# Patient Record
Sex: MALE | Race: BLACK OR AFRICAN AMERICAN | NOT HISPANIC OR LATINO | Employment: UNEMPLOYED | URBAN - METROPOLITAN AREA
[De-identification: names, ages, dates, MRNs, and addresses within clinical notes are randomized per-mention and may not be internally consistent; named-entity substitution may affect disease eponyms.]

---

## 2021-10-11 ENCOUNTER — HISTORICAL (OUTPATIENT)
Dept: ADMINISTRATIVE | Facility: HOSPITAL | Age: 56
End: 2021-10-11

## 2021-10-11 LAB
ABS NEUT (OLG): 2.53 X10(3)/MCL (ref 2.1–9.2)
ALBUMIN SERPL-MCNC: 4.2 GM/DL (ref 3.5–5)
ALBUMIN/GLOB SERPL: 1.2 RATIO (ref 1.1–2)
ALP SERPL-CCNC: 82 UNIT/L (ref 40–150)
ALT SERPL-CCNC: 16 UNIT/L (ref 0–55)
AST SERPL-CCNC: 15 UNIT/L (ref 5–34)
BASOPHILS # BLD AUTO: 0.1 X10(3)/MCL (ref 0–0.2)
BASOPHILS NFR BLD AUTO: 1 %
BILIRUB SERPL-MCNC: 0.5 MG/DL
BILIRUBIN DIRECT+TOT PNL SERPL-MCNC: 0.1 MG/DL (ref 0–0.5)
BILIRUBIN DIRECT+TOT PNL SERPL-MCNC: 0.4 MG/DL (ref 0–0.8)
BUN SERPL-MCNC: 9 MG/DL (ref 8.4–25.7)
CALCIUM SERPL-MCNC: 9.9 MG/DL (ref 8.4–10.2)
CHLORIDE SERPL-SCNC: 107 MMOL/L (ref 98–107)
CO2 SERPL-SCNC: 24 MMOL/L (ref 22–29)
CREAT SERPL-MCNC: 0.79 MG/DL (ref 0.73–1.18)
CRP SERPL HS-MCNC: 0.04 MG/DL
DEPRECATED CALCIDIOL+CALCIFEROL SERPL-MC: 22.7 NG/ML (ref 30–80)
EOSINOPHIL # BLD AUTO: 0.2 X10(3)/MCL (ref 0–0.9)
EOSINOPHIL NFR BLD AUTO: 4 %
ERYTHROCYTE [DISTWIDTH] IN BLOOD BY AUTOMATED COUNT: 15.1 % (ref 11.5–17)
GLOBULIN SER-MCNC: 3.4 GM/DL (ref 2.4–3.5)
GLUCOSE SERPL-MCNC: 110 MG/DL (ref 74–100)
HBV SURFACE AG SERPL QL IA: NONREACTIVE
HCT VFR BLD AUTO: 46.1 % (ref 42–52)
HCV AB SERPL QL IA: REACTIVE
HGB BLD-MCNC: 14.7 GM/DL (ref 14–18)
LYMPHOCYTES # BLD AUTO: 2 X10(3)/MCL (ref 0.6–4.6)
LYMPHOCYTES NFR BLD AUTO: 38 %
MCH RBC QN AUTO: 27.5 PG (ref 27–31)
MCHC RBC AUTO-ENTMCNC: 31.9 GM/DL (ref 33–36)
MCV RBC AUTO: 86.3 FL (ref 80–94)
MONOCYTES # BLD AUTO: 0.4 X10(3)/MCL (ref 0.1–1.3)
MONOCYTES NFR BLD AUTO: 8 %
NEUTROPHILS # BLD AUTO: 2.53 X10(3)/MCL (ref 2.1–9.2)
NEUTROPHILS NFR BLD AUTO: 49 %
PLATELET # BLD AUTO: 260 X10(3)/MCL (ref 130–400)
PMV BLD AUTO: 10.6 FL (ref 9.4–12.4)
POTASSIUM SERPL-SCNC: 3.9 MMOL/L (ref 3.5–5.1)
PROT SERPL-MCNC: 7.6 GM/DL (ref 6.4–8.3)
RBC # BLD AUTO: 5.34 X10(6)/MCL (ref 4.7–6.1)
RHEUMATOID FACT SERPL-ACNC: 20 IU/ML
SODIUM SERPL-SCNC: 142 MMOL/L (ref 136–145)
WBC # SPEC AUTO: 5.2 X10(3)/MCL (ref 4.5–11.5)

## 2021-10-19 ENCOUNTER — HISTORICAL (OUTPATIENT)
Dept: LAB | Facility: HOSPITAL | Age: 56
End: 2021-10-19

## 2021-10-19 LAB
ABS NEUT (OLG): 2.23 X10(3)/MCL (ref 2.1–9.2)
ALBUMIN SERPL-MCNC: 4.3 GM/DL (ref 3.5–5)
ALBUMIN/GLOB SERPL: 1.2 RATIO (ref 1.1–2)
ALP SERPL-CCNC: 78 UNIT/L (ref 40–150)
ALT SERPL-CCNC: 20 UNIT/L (ref 0–55)
AST SERPL-CCNC: 21 UNIT/L (ref 5–34)
BASOPHILS # BLD AUTO: 0 X10(3)/MCL (ref 0–0.2)
BASOPHILS NFR BLD AUTO: 1 %
BILIRUB SERPL-MCNC: 0.6 MG/DL
BILIRUBIN DIRECT+TOT PNL SERPL-MCNC: 0.2 MG/DL (ref 0–0.5)
BILIRUBIN DIRECT+TOT PNL SERPL-MCNC: 0.4 MG/DL (ref 0–0.8)
BUN SERPL-MCNC: 12.7 MG/DL (ref 8.4–25.7)
CALCIUM SERPL-MCNC: 9.9 MG/DL (ref 8.4–10.2)
CHLORIDE SERPL-SCNC: 106 MMOL/L (ref 98–107)
CHOLEST SERPL-MCNC: 234 MG/DL
CHOLEST/HDLC SERPL: 6 {RATIO} (ref 0–5)
CO2 SERPL-SCNC: 21 MMOL/L (ref 22–29)
CREAT SERPL-MCNC: 0.79 MG/DL (ref 0.73–1.18)
EOSINOPHIL # BLD AUTO: 0.2 X10(3)/MCL (ref 0–0.9)
EOSINOPHIL NFR BLD AUTO: 4 %
ERYTHROCYTE [DISTWIDTH] IN BLOOD BY AUTOMATED COUNT: 14.9 % (ref 11.5–17)
EST. AVERAGE GLUCOSE BLD GHB EST-MCNC: 108.3 MG/DL
FT4I SERPL CALC-MCNC: 2.66 (ref 2.6–3.6)
GLOBULIN SER-MCNC: 3.6 GM/DL (ref 2.4–3.5)
GLUCOSE SERPL-MCNC: 92 MG/DL (ref 74–100)
HBA1C MFR BLD: 5.4 %
HCT VFR BLD AUTO: 48.2 % (ref 42–52)
HDLC SERPL-MCNC: 37 MG/DL (ref 35–60)
HGB BLD-MCNC: 15.1 GM/DL (ref 14–18)
IRON SATN MFR SERPL: 20 % (ref 20–50)
IRON SERPL-MCNC: 77 UG/DL (ref 65–175)
LDLC SERPL CALC-MCNC: 167 MG/DL (ref 50–140)
LYMPHOCYTES # BLD AUTO: 1.7 X10(3)/MCL (ref 0.6–4.6)
LYMPHOCYTES NFR BLD AUTO: 38 %
MCH RBC QN AUTO: 27.2 PG (ref 27–31)
MCHC RBC AUTO-ENTMCNC: 31.3 GM/DL (ref 33–36)
MCV RBC AUTO: 86.7 FL (ref 80–94)
MONOCYTES # BLD AUTO: 0.4 X10(3)/MCL (ref 0.1–1.3)
MONOCYTES NFR BLD AUTO: 9 %
NEUTROPHILS # BLD AUTO: 2.23 X10(3)/MCL (ref 1.4–7.9)
NEUTROPHILS NFR BLD AUTO: 48 %
PLATELET # BLD AUTO: 277 X10(3)/MCL (ref 130–400)
PMV BLD AUTO: 9.6 FL (ref 9.4–12.4)
POTASSIUM SERPL-SCNC: 4.1 MMOL/L (ref 3.5–5.1)
PROT SERPL-MCNC: 7.9 GM/DL (ref 6.4–8.3)
RBC # BLD AUTO: 5.56 X10(6)/MCL (ref 4.7–6.1)
SODIUM SERPL-SCNC: 137 MMOL/L (ref 136–145)
T3RU NFR SERPL: 36 % (ref 31–39)
T4 SERPL-MCNC: 7.4 UG/DL (ref 4.87–11.72)
TIBC SERPL-MCNC: 308 UG/DL (ref 69–240)
TIBC SERPL-MCNC: 385 UG/DL (ref 250–450)
TRANSFERRIN SERPL-MCNC: 351 MG/DL (ref 174–364)
TRIGL SERPL-MCNC: 152 MG/DL (ref 34–140)
TSH SERPL-ACNC: 0.52 UIU/ML (ref 0.35–4.94)
VLDLC SERPL CALC-MCNC: 30 MG/DL
WBC # SPEC AUTO: 4.6 X10(3)/MCL (ref 4.5–11.5)

## 2021-11-10 ENCOUNTER — HISTORICAL (OUTPATIENT)
Dept: SPEECH THERAPY | Facility: HOSPITAL | Age: 56
End: 2021-11-10

## 2022-02-22 ENCOUNTER — HISTORICAL (OUTPATIENT)
Dept: ADMINISTRATIVE | Facility: HOSPITAL | Age: 57
End: 2022-02-22

## 2022-02-22 ENCOUNTER — HISTORICAL (OUTPATIENT)
Dept: RADIOLOGY | Facility: HOSPITAL | Age: 57
End: 2022-02-22

## 2022-03-24 ENCOUNTER — HISTORICAL (OUTPATIENT)
Dept: ADMINISTRATIVE | Facility: HOSPITAL | Age: 57
End: 2022-03-24

## 2022-03-24 LAB
ALBUMIN SERPL-MCNC: 4.1 G/DL (ref 3.5–5)
ALBUMIN/GLOB SERPL: 1.2 {RATIO} (ref 1.1–2)
ALP SERPL-CCNC: 72 U/L (ref 40–150)
ALT SERPL-CCNC: 23 U/L (ref 0–55)
AST SERPL-CCNC: 22 U/L (ref 5–34)
BILIRUB SERPL-MCNC: 0.4 MG/DL
BILIRUBIN DIRECT+TOT PNL SERPL-MCNC: 0.1 (ref 0–0.5)
BILIRUBIN DIRECT+TOT PNL SERPL-MCNC: 0.3 (ref 0–0.8)
BUN SERPL-MCNC: 9.7 MG/DL (ref 8.4–25.7)
CALCIUM SERPL-MCNC: 9.4 MG/DL (ref 8.7–10.5)
CHLORIDE SERPL-SCNC: 108 MMOL/L (ref 98–107)
CHOLEST SERPL-MCNC: 242 MG/DL
CHOLEST/HDLC SERPL: 7 {RATIO} (ref 0–5)
CO2 SERPL-SCNC: 22 MMOL/L (ref 22–29)
CREAT SERPL-MCNC: 0.89 MG/DL (ref 0.73–1.18)
ERYTHROCYTE [DISTWIDTH] IN BLOOD BY AUTOMATED COUNT: 16 % (ref 11.5–17)
GLOBULIN SER-MCNC: 3.5 G/DL (ref 2.4–3.5)
GLUCOSE SERPL-MCNC: 93 MG/DL (ref 74–100)
HCT VFR BLD AUTO: 45.9 % (ref 42–52)
HDLC SERPL-MCNC: 33 MG/DL (ref 35–60)
HEMOLYSIS INTERF INDEX SERPL-ACNC: 3
HEMOLYSIS INTERF INDEX SERPL-ACNC: 3
HGB BLD-MCNC: 14.5 G/DL (ref 14–18)
ICTERIC INTERF INDEX SERPL-ACNC: 0
INR PPP: 1 (ref 0–1.3)
LDLC SERPL CALC-MCNC: 171 MG/DL (ref 50–140)
LIPEMIC INTERF INDEX SERPL-ACNC: 2
MAGNESIUM SERPL-MCNC: 2.1 MG/DL (ref 1.6–2.6)
MCH RBC QN AUTO: 26.8 PG (ref 27–31)
MCHC RBC AUTO-ENTMCNC: 31.6 G/DL (ref 33–36)
MCV RBC AUTO: 84.7 FL (ref 80–94)
PLATELET # BLD AUTO: 293 10*3/UL (ref 130–400)
PMV BLD AUTO: 10.9 FL (ref 9.4–12.4)
POTASSIUM SERPL-SCNC: 4.4 MMOL/L (ref 3.5–5.1)
PROT SERPL-MCNC: 7.6 G/DL (ref 6.4–8.3)
PROTHROMBIN TIME: 12.5 S (ref 12.5–14.5)
RBC # BLD AUTO: 5.42 10*6/UL (ref 4.7–6.1)
SODIUM SERPL-SCNC: 140 MMOL/L (ref 136–145)
TRIGL SERPL-MCNC: 190 MG/DL (ref 34–140)
TSH SERPL-ACNC: 1.08 M[IU]/L (ref 0.35–4.94)
VLDLC SERPL CALC-MCNC: 38 MG/DL
WBC # SPEC AUTO: 4.5 10*3/UL (ref 4.5–11.5)

## 2022-03-25 ENCOUNTER — HISTORICAL (OUTPATIENT)
Dept: CARDIOLOGY | Facility: HOSPITAL | Age: 57
End: 2022-03-25

## 2022-04-10 ENCOUNTER — HISTORICAL (OUTPATIENT)
Dept: ADMINISTRATIVE | Facility: HOSPITAL | Age: 57
End: 2022-04-10
Payer: MEDICAID

## 2022-04-11 ENCOUNTER — HISTORICAL (OUTPATIENT)
Dept: LAB | Facility: HOSPITAL | Age: 57
End: 2022-04-11

## 2022-04-11 LAB
ALBUMIN SERPL-MCNC: 4.2 G/DL (ref 3.5–5)
ALBUMIN/GLOB SERPL: 1.3 {RATIO} (ref 1.1–2)
ALP SERPL-CCNC: 74 U/L (ref 40–150)
ALT SERPL-CCNC: 37 U/L (ref 0–55)
AST SERPL-CCNC: 29 U/L (ref 5–34)
BILIRUB SERPL-MCNC: 0.7 MG/DL
BILIRUBIN DIRECT+TOT PNL SERPL-MCNC: 0.3 (ref 0–0.5)
BILIRUBIN DIRECT+TOT PNL SERPL-MCNC: 0.4 (ref 0–0.8)
BUN SERPL-MCNC: 12.6 MG/DL (ref 8.4–25.7)
CALCIUM SERPL-MCNC: 9.9 MG/DL (ref 8.7–10.5)
CHLORIDE SERPL-SCNC: 107 MMOL/L (ref 98–107)
CO2 SERPL-SCNC: 23 MMOL/L (ref 22–29)
CREAT SERPL-MCNC: 0.94 MG/DL (ref 0.73–1.18)
ERYTHROCYTE [DISTWIDTH] IN BLOOD BY AUTOMATED COUNT: 16.1 % (ref 11.5–17)
GLOBULIN SER-MCNC: 3.3 G/DL (ref 2.4–3.5)
GLUCOSE SERPL-MCNC: 110 MG/DL (ref 74–100)
HCT VFR BLD AUTO: 44.4 % (ref 42–52)
HEMOLYSIS INTERF INDEX SERPL-ACNC: 2
HGB BLD-MCNC: 14.1 G/DL (ref 14–18)
ICTERIC INTERF INDEX SERPL-ACNC: 0
LIPEMIC INTERF INDEX SERPL-ACNC: <0
MCH RBC QN AUTO: 27 PG (ref 27–31)
MCHC RBC AUTO-ENTMCNC: 31.8 G/DL (ref 33–36)
MCV RBC AUTO: 85.1 FL (ref 80–94)
PLATELET # BLD AUTO: 264 10*3/UL (ref 130–400)
PMV BLD AUTO: 10.1 FL (ref 9.4–12.4)
POTASSIUM SERPL-SCNC: 4 MMOL/L (ref 3.5–5.1)
PROT SERPL-MCNC: 7.5 G/DL (ref 6.4–8.3)
RBC # BLD AUTO: 5.22 10*6/UL (ref 4.7–6.1)
SODIUM SERPL-SCNC: 140 MMOL/L (ref 136–145)
WBC # SPEC AUTO: 5 10*3/UL (ref 4.5–11.5)

## 2022-04-13 ENCOUNTER — HISTORICAL (OUTPATIENT)
Dept: ADMINISTRATIVE | Facility: HOSPITAL | Age: 57
End: 2022-04-13

## 2022-04-26 VITALS
OXYGEN SATURATION: 95 % | DIASTOLIC BLOOD PRESSURE: 92 MMHG | HEIGHT: 66 IN | SYSTOLIC BLOOD PRESSURE: 142 MMHG | BODY MASS INDEX: 37.41 KG/M2 | WEIGHT: 232.81 LBS

## 2022-05-03 RX ORDER — HYDROCODONE BITARTRATE AND ACETAMINOPHEN 10; 325 MG/1; MG/1
1 TABLET ORAL EVERY 12 HOURS PRN
Qty: 60 TABLET | Refills: 0 | Status: SHIPPED | OUTPATIENT
Start: 2022-05-03 | End: 2022-05-31

## 2022-05-14 NOTE — DISCHARGE SUMMARY
DISCHARGE DATE:  03/25/2022    HOSPITAL COURSE:  Mr. Restrepo was admitted for elective implantation of an implantable recorder to further evaluate suspicious cardiovascular symptoms of syncope, presyncope and palpitations.  As outlined in the operative report, he underwent a successful and uneventful implantation of the [employer] implantable loop recorder/Confirm device.  He tolerated the procedure rather well, and the postoperative course has been unremarkable, allowing for discharge home today.    DISCHARGE DIAGNOSES:    1. Palpitations/presyncope/syncope.  2. Status post successful implantation of the [employer] implantable loop recorder/Confirm.    3. Hypertension.   4. Dyslipidemia.    RECOMMENDATIONS:  The patient will be arranged for discharge home today to continue same medications as on admission.  I have asked him to call or return should he have any further problems or complaints.  I will be checking on his progress in my office in the very near future.  We will continue to monitor his progress closely and titrate him back to therapies as tolerated.  He will also be discharged home on empiric Cipro 250 mg twice daily for 1 week, as surgical prophylaxis, and also on p.r.n. ibuprofen for possible pain.  I am also discharging him on Lipitor 40 mg daily, and omega-3 fish oil 2 g twice daily.  Further recommendations forthcoming.        ______________________________  DO CESAR Alexander/  DD:  03/25/2022  Time:  05:07PM  DT:  03/25/2022  Time:  05:53PM  Job #:  [id number]    cc: Hemant Maravilla DO

## 2022-05-14 NOTE — OP NOTE
Patient:   Darius Restrepo            MRN: [id number]            FIN: [id number]               Age:   57 years     Sex:  Male     :  1965   Associated Diagnoses:   Palpitations; Pre-syncope; Syncope   Author:   Hemant Maravilla DO      Brief Operative Note   Operative Information   Date/ Time:  3/25/2022 11:34:00.     Preoperative Diagnosis: Palpitations (ZGT38-UZ R00.2), Pre-syncope (CDP41-YD R55), Syncope (LGE63-QK R55).     Postoperative Diagnosis: Palpitations (MEE35-EV R00.2), Pre-syncope (IXA95-OQ R55), Syncope (ZBD06-VG R55).     Procedures Performed: Implantation of SJM ILR.   .     Surgeon: Hemant Maravilla DO     Esimated blood loss: No blood loss.     Description of Procedure/Findings/    Complications: See op report on chart.   .

## 2022-05-17 DIAGNOSIS — M75.101 ROTATOR CUFF SYNDROME OF RIGHT SHOULDER: Primary | ICD-10-CM

## 2022-05-23 ENCOUNTER — HOSPITAL ENCOUNTER (EMERGENCY)
Facility: HOSPITAL | Age: 57
Discharge: HOME | End: 2022-05-23
Attending: INTERNAL MEDICINE
Payer: MEDICAID

## 2022-05-23 VITALS
TEMPERATURE: 99 F | BODY MASS INDEX: 38.57 KG/M2 | OXYGEN SATURATION: 96 % | WEIGHT: 240 LBS | HEIGHT: 66 IN | RESPIRATION RATE: 26 BRPM | SYSTOLIC BLOOD PRESSURE: 145 MMHG | DIASTOLIC BLOOD PRESSURE: 87 MMHG | HEART RATE: 76 BPM

## 2022-05-23 DIAGNOSIS — G40.909 SEIZURE DISORDER: Primary | ICD-10-CM

## 2022-05-23 DIAGNOSIS — I10 HYPERTENSION: ICD-10-CM

## 2022-05-23 DIAGNOSIS — R56.9 SEIZURE: ICD-10-CM

## 2022-05-23 LAB
ALBUMIN SERPL-MCNC: 4 GM/DL (ref 3.5–5)
ALBUMIN/GLOB SERPL: 1.1 RATIO (ref 1.1–2)
ALP SERPL-CCNC: 85 UNIT/L (ref 40–150)
ALT SERPL-CCNC: 23 UNIT/L (ref 0–55)
AST SERPL-CCNC: 19 UNIT/L (ref 5–34)
BASOPHILS # BLD AUTO: 0.03 X10(3)/MCL (ref 0–0.2)
BASOPHILS NFR BLD AUTO: 0.6 %
BILIRUBIN DIRECT+TOT PNL SERPL-MCNC: 0.8 MG/DL
BUN SERPL-MCNC: 17.7 MG/DL (ref 8.4–25.7)
CALCIUM SERPL-MCNC: 9.7 MG/DL (ref 8.4–10.2)
CHLORIDE SERPL-SCNC: 106 MMOL/L (ref 98–107)
CK SERPL-CCNC: 384 U/L (ref 30–200)
CO2 SERPL-SCNC: 24 MMOL/L (ref 22–29)
CREAT SERPL-MCNC: 0.99 MG/DL (ref 0.73–1.18)
EOSINOPHIL # BLD AUTO: 0.15 X10(3)/MCL (ref 0–0.9)
EOSINOPHIL NFR BLD AUTO: 2.8 %
ERYTHROCYTE [DISTWIDTH] IN BLOOD BY AUTOMATED COUNT: 15.4 % (ref 11.5–17)
GLOBULIN SER-MCNC: 3.7 GM/DL (ref 2.4–3.5)
GLUCOSE SERPL-MCNC: 91 MG/DL (ref 74–100)
HCT VFR BLD AUTO: 45.1 % (ref 42–52)
HGB BLD-MCNC: 14.5 GM/DL (ref 14–18)
IMM GRANULOCYTES # BLD AUTO: 0 X10(3)/MCL (ref 0–0.02)
IMM GRANULOCYTES NFR BLD AUTO: 0 % (ref 0–0.43)
LYMPHOCYTES # BLD AUTO: 1.22 X10(3)/MCL (ref 0.6–4.6)
LYMPHOCYTES NFR BLD AUTO: 22.5 %
MCH RBC QN AUTO: 27.2 PG (ref 27–31)
MCHC RBC AUTO-ENTMCNC: 32.2 MG/DL (ref 33–36)
MCV RBC AUTO: 84.6 FL (ref 80–94)
MONOCYTES # BLD AUTO: 0.44 X10(3)/MCL (ref 0.1–1.3)
MONOCYTES NFR BLD AUTO: 8.1 %
NEUTROPHILS # BLD AUTO: 3.6 X10(3)/MCL (ref 2.1–9.2)
NEUTROPHILS NFR BLD AUTO: 66 %
PLATELET # BLD AUTO: 277 X10(3)/MCL (ref 130–400)
PMV BLD AUTO: 9.6 FL (ref 9.4–12.4)
POTASSIUM SERPL-SCNC: 3.7 MMOL/L (ref 3.5–5.1)
PROT SERPL-MCNC: 7.7 GM/DL (ref 6.4–8.3)
RBC # BLD AUTO: 5.33 X10(6)/MCL (ref 4.7–6.1)
SODIUM SERPL-SCNC: 139 MMOL/L (ref 136–145)
WBC # SPEC AUTO: 5.4 X10(3)/MCL (ref 4.5–11.5)

## 2022-05-23 PROCEDURE — 63600175 PHARM REV CODE 636 W HCPCS

## 2022-05-23 PROCEDURE — 82550 ASSAY OF CK (CPK): CPT | Performed by: INTERNAL MEDICINE

## 2022-05-23 PROCEDURE — 93010 ELECTROCARDIOGRAM REPORT: CPT | Mod: ,,, | Performed by: INTERNAL MEDICINE

## 2022-05-23 PROCEDURE — 80053 COMPREHEN METABOLIC PANEL: CPT | Performed by: INTERNAL MEDICINE

## 2022-05-23 PROCEDURE — 93010 EKG 12-LEAD: ICD-10-PCS | Mod: ,,, | Performed by: INTERNAL MEDICINE

## 2022-05-23 PROCEDURE — 36415 COLL VENOUS BLD VENIPUNCTURE: CPT | Performed by: INTERNAL MEDICINE

## 2022-05-23 PROCEDURE — 85025 COMPLETE CBC W/AUTO DIFF WBC: CPT | Performed by: INTERNAL MEDICINE

## 2022-05-23 PROCEDURE — 99285 EMERGENCY DEPT VISIT HI MDM: CPT | Mod: 25

## 2022-05-23 PROCEDURE — 96365 THER/PROPH/DIAG IV INF INIT: CPT

## 2022-05-23 PROCEDURE — 93005 ELECTROCARDIOGRAM TRACING: CPT

## 2022-05-23 RX ORDER — CLOPIDOGREL BISULFATE 75 MG/1
75 TABLET ORAL DAILY
COMMUNITY
End: 2023-01-30

## 2022-05-23 RX ORDER — PREDNISONE 20 MG/1
20 TABLET ORAL 2 TIMES DAILY
COMMUNITY
End: 2022-06-06

## 2022-05-23 RX ORDER — MELOXICAM 15 MG/1
15 TABLET ORAL DAILY
COMMUNITY
Start: 2021-12-09 | End: 2022-06-06 | Stop reason: SDUPTHER

## 2022-05-23 RX ORDER — SERTRALINE HYDROCHLORIDE 100 MG/1
100 TABLET, FILM COATED ORAL DAILY
COMMUNITY
End: 2022-06-06

## 2022-05-23 RX ORDER — IBUPROFEN 800 MG/1
800 TABLET ORAL 3 TIMES DAILY PRN
COMMUNITY
End: 2022-06-06

## 2022-05-23 RX ORDER — LEVETIRACETAM 5 MG/ML
INJECTION INTRAVASCULAR
Status: COMPLETED
Start: 2022-05-23 | End: 2022-05-23

## 2022-05-23 RX ORDER — HYDRALAZINE HYDROCHLORIDE 10 MG/1
10 TABLET, FILM COATED ORAL 3 TIMES DAILY
COMMUNITY
Start: 2021-10-31 | End: 2023-02-21 | Stop reason: ALTCHOICE

## 2022-05-23 RX ORDER — RISPERIDONE 1 MG/1
1 TABLET, ORALLY DISINTEGRATING ORAL 2 TIMES DAILY
COMMUNITY
End: 2023-02-21 | Stop reason: ALTCHOICE

## 2022-05-23 RX ORDER — LEVETIRACETAM 500 MG/1
500 TABLET ORAL 2 TIMES DAILY
Qty: 60 TABLET | Refills: 11 | Status: SHIPPED | OUTPATIENT
Start: 2022-05-23 | End: 2023-01-30

## 2022-05-23 RX ORDER — DICLOFENAC POTASSIUM 50 MG/1
50 TABLET, FILM COATED ORAL 2 TIMES DAILY
COMMUNITY
End: 2022-06-06

## 2022-05-23 RX ORDER — ACETAMINOPHEN AND CODEINE PHOSPHATE 300; 30 MG/1; MG/1
1 TABLET ORAL EVERY 8 HOURS PRN
COMMUNITY
End: 2022-06-06

## 2022-05-23 RX ORDER — LEFLUNOMIDE 10 MG/1
20 TABLET ORAL DAILY
COMMUNITY
End: 2022-06-06 | Stop reason: SDUPTHER

## 2022-05-23 RX ORDER — ESCITALOPRAM OXALATE 20 MG/1
20 TABLET ORAL DAILY
COMMUNITY
End: 2022-06-06 | Stop reason: SDUPTHER

## 2022-05-23 RX ORDER — LEVETIRACETAM 500 MG/5ML
1000 INJECTION, SOLUTION, CONCENTRATE INTRAVENOUS
Status: DISCONTINUED | OUTPATIENT
Start: 2022-05-23 | End: 2022-05-23 | Stop reason: HOSPADM

## 2022-05-23 RX ORDER — AMLODIPINE BESYLATE 10 MG/1
10 TABLET ORAL DAILY
Status: ON HOLD | COMMUNITY
End: 2022-12-02

## 2022-05-23 RX ORDER — HYDROCHLOROTHIAZIDE 12.5 MG/1
12.5 TABLET ORAL DAILY
COMMUNITY

## 2022-05-23 RX ORDER — OMEPRAZOLE 40 MG/1
40 CAPSULE, DELAYED RELEASE ORAL DAILY
COMMUNITY
Start: 2022-01-31 | End: 2022-06-06 | Stop reason: SDUPTHER

## 2022-05-23 RX ORDER — QUETIAPINE FUMARATE 50 MG/1
50 TABLET, FILM COATED ORAL NIGHTLY
COMMUNITY
End: 2022-06-06

## 2022-05-23 RX ORDER — CLONIDINE 0.1 MG/24H
1 PATCH, EXTENDED RELEASE TRANSDERMAL
Status: ON HOLD | COMMUNITY
End: 2022-12-02

## 2022-05-23 RX ADMIN — LEVETIRACETAM 1000 MG: 5 INJECTION INTRAVENOUS at 04:05

## 2022-05-23 NOTE — PROVIDER PROGRESS NOTES - EMERGENCY DEPT.
Encounter Date: 5/23/2022    ED Physician Progress Notes        Labs are normal, patient unable to remember if he is taking anti seizure meds, will give IV keppra and discharge on keppra 750 BID, follow up with PCP in 1 week

## 2022-05-23 NOTE — ED PROVIDER NOTES
Encounter Date: 5/23/2022       History     Chief Complaint   Patient presents with    Seizures     Seizure prior to arrival. History of seizures     58y/o male brought by EMS for a breakthrough seizure while sitting on the porch this morning witnessed by family.  He has a hx of Seizure  but does not know his meds. He gets frequent episode every month . He has had previous stroke in the past affected his speech and right side. He did not hit his head. He has a hx of HTN, CVA and heart disease.        Review of patient's allergies indicates:  No Known Allergies  No past medical history on file.  No past surgical history on file.  No family history on file.     Review of Systems   Constitutional: Negative for chills and fever.   HENT: Negative.    Eyes: Negative.    Respiratory: Negative for chest tightness and shortness of breath.    Cardiovascular: Negative for chest pain.   Gastrointestinal: Negative for abdominal pain.   Endocrine: Negative.    Genitourinary: Negative.    Musculoskeletal: Negative for back pain.   Neurological: Positive for headaches. Negative for dizziness and light-headedness.   Hematological: Negative.        Physical Exam     Initial Vitals [05/23/22 1302]   BP Pulse Resp Temp SpO2   119/79 84 20 98.5 °F (36.9 °C) 97 %      MAP       --         Physical Exam    Nursing note and vitals reviewed.  Constitutional: He appears well-developed and well-nourished. No distress.   HENT:   Head: Normocephalic.   Mouth/Throat: Oropharynx is clear and moist.   Eyes: Conjunctivae and EOM are normal. Pupils are equal, round, and reactive to light.   Neck: Neck supple. No JVD present.   Normal range of motion.  Cardiovascular: Normal rate, regular rhythm and normal heart sounds.   Pulmonary/Chest: Breath sounds normal. No respiratory distress. He has no wheezes. He has no rales.   Abdominal: Abdomen is soft. Bowel sounds are normal.   Musculoskeletal:         General: No edema. Normal range of motion.       Cervical back: Normal range of motion and neck supple.     Neurological: He is alert and oriented to person, place, and time. He displays normal reflexes. No cranial nerve deficit or sensory deficit.   Slight weakness RUE from previous stroke  Negative babinski   Skin: Skin is warm. Capillary refill takes less than 2 seconds.   Psychiatric: He has a normal mood and affect.         ED Course   Procedures  Labs Reviewed   CBC W/ AUTO DIFFERENTIAL    Narrative:     The following orders were created for panel order CBC auto differential.  Procedure                               Abnormality         Status                     ---------                               -----------         ------                     CBC with Differential[[id number]]                                                         Please view results for these tests on the individual orders.   COMPREHENSIVE METABOLIC PANEL   CK   CBC WITH DIFFERENTIAL     EKG Readings: (Independently Interpreted)   Initial Reading: No STEMI. Heart Rate: 78. Conduction: Normal. ST Segments: Normal ST Segments. Axis: Normal.       Imaging Results    None          Medications - No data to display                       Clinical Impression:   Final diagnoses:  [I10] Hypertension  [R56.9] Seizure     will do labs, CXR, CT head, place on cardiac monitor, observe then reassess pt.            Robson Uriarte MD  05/23/22 [id number]

## 2022-05-31 RX ORDER — HYDROCODONE BITARTRATE AND ACETAMINOPHEN 10; 325 MG/1; MG/1
TABLET ORAL
Qty: 60 TABLET | Refills: 0 | Status: SHIPPED | OUTPATIENT
Start: 2022-05-31 | End: 2022-06-06 | Stop reason: SDUPTHER

## 2022-06-06 ENCOUNTER — OFFICE VISIT (OUTPATIENT)
Dept: RHEUMATOLOGY | Facility: CLINIC | Age: 57
End: 2022-06-06
Payer: MEDICAID

## 2022-06-06 VITALS
TEMPERATURE: 98 F | RESPIRATION RATE: 18 BRPM | BODY MASS INDEX: 40.47 KG/M2 | SYSTOLIC BLOOD PRESSURE: 165 MMHG | DIASTOLIC BLOOD PRESSURE: 103 MMHG | OXYGEN SATURATION: 95 % | HEIGHT: 66 IN | HEART RATE: 86 BPM | WEIGHT: 251.81 LBS

## 2022-06-06 DIAGNOSIS — M35.9 UNDIFFERENTIATED CONNECTIVE TISSUE DISEASE: Primary | ICD-10-CM

## 2022-06-06 DIAGNOSIS — M75.100 TEAR OF ROTATOR CUFF, UNSPECIFIED LATERALITY, UNSPECIFIED TEAR EXTENT, UNSPECIFIED WHETHER TRAUMATIC: ICD-10-CM

## 2022-06-06 DIAGNOSIS — F51.01 PRIMARY INSOMNIA: ICD-10-CM

## 2022-06-06 DIAGNOSIS — I10 PRIMARY HYPERTENSION: ICD-10-CM

## 2022-06-06 DIAGNOSIS — F32.A DEPRESSIVE DISORDER: ICD-10-CM

## 2022-06-06 DIAGNOSIS — M79.7 FIBROMYALGIA: ICD-10-CM

## 2022-06-06 PROBLEM — G47.00 INSOMNIA: Status: ACTIVE | Noted: 2022-06-06

## 2022-06-06 PROCEDURE — 99999 PR PBB SHADOW E&M-EST. PATIENT-LVL IV: CPT | Mod: PBBFAC,,, | Performed by: INTERNAL MEDICINE

## 2022-06-06 PROCEDURE — 3008F BODY MASS INDEX DOCD: CPT | Mod: CPTII,,, | Performed by: INTERNAL MEDICINE

## 2022-06-06 PROCEDURE — 99214 OFFICE O/P EST MOD 30 MIN: CPT | Mod: S$PBB,,, | Performed by: INTERNAL MEDICINE

## 2022-06-06 PROCEDURE — 1159F PR MEDICATION LIST DOCUMENTED IN MEDICAL RECORD: ICD-10-PCS | Mod: CPTII,,, | Performed by: INTERNAL MEDICINE

## 2022-06-06 PROCEDURE — 1160F RVW MEDS BY RX/DR IN RCRD: CPT | Mod: CPTII,,, | Performed by: INTERNAL MEDICINE

## 2022-06-06 PROCEDURE — 99999 PR PBB SHADOW E&M-EST. PATIENT-LVL IV: ICD-10-PCS | Mod: PBBFAC,,, | Performed by: INTERNAL MEDICINE

## 2022-06-06 PROCEDURE — 4010F ACE/ARB THERAPY RXD/TAKEN: CPT | Mod: CPTII,,, | Performed by: INTERNAL MEDICINE

## 2022-06-06 PROCEDURE — 1160F PR REVIEW ALL MEDS BY PRESCRIBER/CLIN PHARMACIST DOCUMENTED: ICD-10-PCS | Mod: CPTII,,, | Performed by: INTERNAL MEDICINE

## 2022-06-06 PROCEDURE — 99214 OFFICE O/P EST MOD 30 MIN: CPT | Mod: PBBFAC | Performed by: INTERNAL MEDICINE

## 2022-06-06 PROCEDURE — 4010F PR ACE/ARB THEARPY RXD/TAKEN: ICD-10-PCS | Mod: CPTII,,, | Performed by: INTERNAL MEDICINE

## 2022-06-06 PROCEDURE — 3008F PR BODY MASS INDEX (BMI) DOCUMENTED: ICD-10-PCS | Mod: CPTII,,, | Performed by: INTERNAL MEDICINE

## 2022-06-06 PROCEDURE — 1159F MED LIST DOCD IN RCRD: CPT | Mod: CPTII,,, | Performed by: INTERNAL MEDICINE

## 2022-06-06 PROCEDURE — 99214 PR OFFICE/OUTPT VISIT, EST, LEVL IV, 30-39 MIN: ICD-10-PCS | Mod: S$PBB,,, | Performed by: INTERNAL MEDICINE

## 2022-06-06 RX ORDER — HYDROCODONE BITARTRATE AND ACETAMINOPHEN 10; 325 MG/1; MG/1
TABLET ORAL
Qty: 60 TABLET | Refills: 0 | Status: SHIPPED | OUTPATIENT
Start: 2022-06-06 | End: 2022-07-05 | Stop reason: SDUPTHER

## 2022-06-06 RX ORDER — PREGABALIN 75 MG/1
75 CAPSULE ORAL 2 TIMES DAILY
COMMUNITY
Start: 2022-03-03 | End: 2022-06-06 | Stop reason: SDUPTHER

## 2022-06-06 RX ORDER — ALPRAZOLAM 0.5 MG/1
0.5 TABLET ORAL 2 TIMES DAILY PRN
COMMUNITY
Start: 2022-05-17 | End: 2022-06-06 | Stop reason: SDUPTHER

## 2022-06-06 RX ORDER — HYDROXYCHLOROQUINE SULFATE 200 MG/1
200 TABLET, FILM COATED ORAL 2 TIMES DAILY
COMMUNITY
Start: 2021-12-10 | End: 2022-06-06 | Stop reason: SDUPTHER

## 2022-06-06 RX ORDER — TRAMADOL HYDROCHLORIDE 50 MG/1
50 TABLET ORAL EVERY 6 HOURS PRN
COMMUNITY
Start: 2022-02-22 | End: 2022-09-16 | Stop reason: SDUPTHER

## 2022-06-06 RX ORDER — KETOROLAC TROMETHAMINE 10 MG/1
10 TABLET, FILM COATED ORAL DAILY PRN
COMMUNITY
Start: 2022-02-14 | End: 2022-06-06

## 2022-06-06 RX ORDER — PREGABALIN 75 MG/1
75 CAPSULE ORAL 3 TIMES DAILY
Qty: 90 CAPSULE | Refills: 5 | Status: SHIPPED | OUTPATIENT
Start: 2022-06-06 | End: 2022-09-16 | Stop reason: SDUPTHER

## 2022-06-06 RX ORDER — ESCITALOPRAM OXALATE 20 MG/1
10 TABLET ORAL DAILY
Qty: 30 TABLET | Refills: 5 | Status: SHIPPED | OUTPATIENT
Start: 2022-06-06 | End: 2022-09-16 | Stop reason: SDUPTHER

## 2022-06-06 RX ORDER — OLANZAPINE 5 MG/1
5 TABLET ORAL NIGHTLY
COMMUNITY
Start: 2022-04-11 | End: 2023-02-21 | Stop reason: ALTCHOICE

## 2022-06-06 RX ORDER — LISINOPRIL AND HYDROCHLOROTHIAZIDE 12.5; 2 MG/1; MG/1
2 TABLET ORAL NIGHTLY
COMMUNITY
Start: 2022-05-19 | End: 2023-02-21 | Stop reason: ALTCHOICE

## 2022-06-06 RX ORDER — MELOXICAM 15 MG/1
15 TABLET ORAL DAILY
Qty: 30 TABLET | Refills: 5 | Status: SHIPPED | OUTPATIENT
Start: 2022-06-06 | End: 2022-09-16

## 2022-06-06 RX ORDER — ATORVASTATIN CALCIUM 20 MG/1
20 TABLET, FILM COATED ORAL DAILY
COMMUNITY
Start: 2022-05-19

## 2022-06-06 RX ORDER — ALPRAZOLAM 0.5 MG/1
0.5 TABLET ORAL 2 TIMES DAILY PRN
Qty: 60 TABLET | Refills: 5 | Status: SHIPPED | OUTPATIENT
Start: 2022-06-06 | End: 2022-09-16 | Stop reason: SDUPTHER

## 2022-06-06 RX ORDER — TEMAZEPAM 15 MG/1
15 CAPSULE ORAL NIGHTLY PRN
COMMUNITY
Start: 2022-01-20 | End: 2022-06-06 | Stop reason: SDUPTHER

## 2022-06-06 RX ORDER — CYCLOBENZAPRINE HCL 10 MG
10 TABLET ORAL 3 TIMES DAILY
COMMUNITY
Start: 2022-05-19 | End: 2022-06-06

## 2022-06-06 RX ORDER — OMEPRAZOLE 40 MG/1
40 CAPSULE, DELAYED RELEASE ORAL DAILY
Qty: 30 CAPSULE | Refills: 5 | Status: SHIPPED | OUTPATIENT
Start: 2022-06-06 | End: 2022-09-16 | Stop reason: SDUPTHER

## 2022-06-06 RX ORDER — QUETIAPINE FUMARATE 300 MG/1
300 TABLET, FILM COATED ORAL NIGHTLY
COMMUNITY
Start: 2022-05-25 | End: 2023-05-25

## 2022-06-06 RX ORDER — CLONIDINE 0.3 MG/24H
1 PATCH, EXTENDED RELEASE TRANSDERMAL
COMMUNITY
Start: 2022-05-25

## 2022-06-06 RX ORDER — TEMAZEPAM 15 MG/1
15 CAPSULE ORAL NIGHTLY
Qty: 30 CAPSULE | Refills: 5 | Status: SHIPPED | OUTPATIENT
Start: 2022-06-06 | End: 2023-02-21 | Stop reason: ALTCHOICE

## 2022-06-06 RX ORDER — ASPIRIN 325 MG
50000 TABLET, DELAYED RELEASE (ENTERIC COATED) ORAL
COMMUNITY
Start: 2022-01-28 | End: 2022-06-06 | Stop reason: SDUPTHER

## 2022-06-06 RX ORDER — LEFLUNOMIDE 10 MG/1
20 TABLET ORAL DAILY
Qty: 30 TABLET | Refills: 5 | Status: SHIPPED | OUTPATIENT
Start: 2022-06-06 | End: 2022-09-16

## 2022-06-06 RX ORDER — IBUPROFEN 200 MG
1 TABLET ORAL DAILY
COMMUNITY
Start: 2022-02-14 | End: 2023-02-21 | Stop reason: ALTCHOICE

## 2022-06-06 RX ORDER — ASPIRIN 325 MG
50000 TABLET, DELAYED RELEASE (ENTERIC COATED) ORAL
Qty: 5 CAPSULE | Refills: 5 | Status: SHIPPED | OUTPATIENT
Start: 2022-06-06 | End: 2022-09-16 | Stop reason: SDUPTHER

## 2022-06-06 RX ORDER — HYDROXYCHLOROQUINE SULFATE 200 MG/1
200 TABLET, FILM COATED ORAL 2 TIMES DAILY
Qty: 60 TABLET | Refills: 5 | Status: SHIPPED | OUTPATIENT
Start: 2022-06-06 | End: 2022-09-16 | Stop reason: SDUPTHER

## 2022-06-06 RX ORDER — CLONIDINE HYDROCHLORIDE 0.2 MG/1
0.2 TABLET ORAL 2 TIMES DAILY
Status: ON HOLD | COMMUNITY
End: 2022-12-02

## 2022-06-06 NOTE — PROGRESS NOTES
"Subjective:       Patient ID: Darius Restrepo is a 57 y.o. male.    Chief Complaint: Follow-up (Generalized pain.. wants to know if the Quantity of his pain medication could be changed from 60 to 90)    New onset seizures. The patient is complaining of joint pain involving the MCP PIP wrist elbow shoulders hips knees and ankles bilaterally.  The pain is 10/10 in intensity dull in quality and continuous.  That is associated with a morning stiffness lasting for more than 60 minutes.  Is also having difficulty maintaining a good night of sleep.  This has been associated with myalgias.  Muscle aches are 10/10 in intensity dull in quality and continuous.  They are associated with fatigue.  No fever no chills no others.      Follow-up  Pertinent negatives include no abdominal pain, chest pain, chills, congestion, fever, rash, vomiting or weakness.     Review of Systems   Constitutional: Negative for appetite change, chills and fever.   HENT: Negative for congestion, ear pain, mouth sores, nosebleeds and trouble swallowing.    Eyes: Negative for photophobia and discharge.   Respiratory: Negative for chest tightness and shortness of breath.    Cardiovascular: Negative for chest pain.   Gastrointestinal: Negative for abdominal pain and vomiting.   Endocrine: Negative.    Genitourinary: Negative for hematuria.   Musculoskeletal:        As per HPI   Skin: Negative for rash.   Neurological: Negative for weakness.        New onset seizures         Objective:   BP (!) 165/103   Pulse 86   Temp 98.1 °F (36.7 °C) (Oral)   Resp 18   Ht 5' 6" (1.676 m)   Wt 114.2 kg (251 lb 12.8 oz)   SpO2 95%   BMI 40.64 kg/m²      Physical Exam   Constitutional: He is oriented to person, place, and time. He appears well-developed and well-nourished. No distress.   HENT:   Head: Normocephalic and atraumatic.   Right Ear: External ear normal.   Left Ear: External ear normal.   Eyes: Pupils are equal, round, and reactive to light. "   Cardiovascular: Normal rate, regular rhythm and normal heart sounds.   Pulmonary/Chest: Breath sounds normal.   Abdominal: Soft. There is no abdominal tenderness.   Musculoskeletal:      Right shoulder: Tenderness present.      Left shoulder: Tenderness present.      Right elbow: Tenderness present.      Left elbow: Tenderness present.      Right wrist: Tenderness present.      Left wrist: Tenderness present.      Cervical back: Neck supple.      Right hip: Tenderness present.      Left hip: Tenderness present.      Right knee: Tenderness present.      Left knee: Tenderness present.      Right ankle: Tenderness present.      Left ankle: Tenderness present.   Lymphadenopathy:     He has no cervical adenopathy.   Neurological: He is alert and oriented to person, place, and time. He displays normal reflexes. No cranial nerve deficit or sensory deficit. He exhibits normal muscle tone. Coordination normal.   Skin: No rash noted. No erythema.   Vitals reviewed.      Right Side Rheumatological Exam     The patient is tender to palpation of the shoulder, elbow, wrist, knee, 1st PIP, 1st MCP, 2nd PIP, 2nd MCP, 3rd PIP, 3rd MCP, 4th PIP, 4th MCP, 5th PIP, hip, ankle, 1st MTP, 2nd MTP, 3rd MTP, 4th MTP, 5th MTP, 1st toe IP, 2nd toe IP, 3rd toe IP, 4th toe IP and 5th toe IP    Left Side Rheumatological Exam     The patient is tender to palpation of the shoulder, elbow, wrist, knee, 1st PIP, 1st MCP, 2nd PIP, 2nd MCP, 3rd PIP, 3rd MCP, 4th PIP, 4th MCP, 5th PIP, 5th MCP, hip, ankle, 1st MTP, 2nd MTP, 3rd MTP, 4th MTP, 5th MTP, 1st toe IP, 2nd toe IP, 3rd toe IP, 4th toe IP and 5th toe IP.         Completed Fibromyalgia exam 18/18 tender points.  No data to display     Assessment:       1. Undifferentiated connective tissue disease    2. Fibromyalgia    3. Tear of rotator cuff, unspecified laterality, unspecified tear extent, unspecified whether traumatic    4. Primary hypertension    5. Depressive disorder    6. Primary  insomnia            Plan:       Problem List Items Addressed This Visit        Psychiatric    Depressive disorder    Relevant Medications    HYDROcodone-acetaminophen (NORCO)  mg per tablet    ALPRAZolam (XANAX) 0.5 MG tablet    cholecalciferol, vitamin D3, 1,250 mcg (50,000 unit) capsule    EScitalopram oxalate (LEXAPRO) 20 MG tablet    hydrOXYchloroQUINE (PLAQUENIL) 200 mg tablet    leflunomide (ARAVA) 10 MG Tab    omeprazole (PRILOSEC) 40 MG capsule    meloxicam (MOBIC) 15 MG tablet    pregabalin (LYRICA) 75 MG capsule    temazepam (RESTORIL) 15 mg Cap       Cardiac/Vascular    Hypertension    Relevant Medications    HYDROcodone-acetaminophen (NORCO)  mg per tablet    ALPRAZolam (XANAX) 0.5 MG tablet    cholecalciferol, vitamin D3, 1,250 mcg (50,000 unit) capsule    EScitalopram oxalate (LEXAPRO) 20 MG tablet    hydrOXYchloroQUINE (PLAQUENIL) 200 mg tablet    leflunomide (ARAVA) 10 MG Tab    omeprazole (PRILOSEC) 40 MG capsule    meloxicam (MOBIC) 15 MG tablet    pregabalin (LYRICA) 75 MG capsule    temazepam (RESTORIL) 15 mg Cap       Immunology/Multi System    Undifferentiated connective tissue disease - Primary    Relevant Medications    HYDROcodone-acetaminophen (NORCO)  mg per tablet    ALPRAZolam (XANAX) 0.5 MG tablet    cholecalciferol, vitamin D3, 1,250 mcg (50,000 unit) capsule    EScitalopram oxalate (LEXAPRO) 20 MG tablet    hydrOXYchloroQUINE (PLAQUENIL) 200 mg tablet    leflunomide (ARAVA) 10 MG Tab    omeprazole (PRILOSEC) 40 MG capsule    meloxicam (MOBIC) 15 MG tablet    pregabalin (LYRICA) 75 MG capsule    temazepam (RESTORIL) 15 mg Cap       Orthopedic    Fibromyalgia    Relevant Medications    HYDROcodone-acetaminophen (NORCO)  mg per tablet    ALPRAZolam (XANAX) 0.5 MG tablet    cholecalciferol, vitamin D3, 1,250 mcg (50,000 unit) capsule    EScitalopram oxalate (LEXAPRO) 20 MG tablet    hydrOXYchloroQUINE (PLAQUENIL) 200 mg tablet    leflunomide (ARAVA) 10 MG Tab     omeprazole (PRILOSEC) 40 MG capsule    meloxicam (MOBIC) 15 MG tablet    pregabalin (LYRICA) 75 MG capsule    temazepam (RESTORIL) 15 mg Cap    Tear of rotator cuff    Relevant Medications    HYDROcodone-acetaminophen (NORCO)  mg per tablet    ALPRAZolam (XANAX) 0.5 MG tablet    cholecalciferol, vitamin D3, 1,250 mcg (50,000 unit) capsule    EScitalopram oxalate (LEXAPRO) 20 MG tablet    hydrOXYchloroQUINE (PLAQUENIL) 200 mg tablet    leflunomide (ARAVA) 10 MG Tab    omeprazole (PRILOSEC) 40 MG capsule    meloxicam (MOBIC) 15 MG tablet    pregabalin (LYRICA) 75 MG capsule    temazepam (RESTORIL) 15 mg Cap       Other    Insomnia    Relevant Medications    HYDROcodone-acetaminophen (NORCO)  mg per tablet    ALPRAZolam (XANAX) 0.5 MG tablet    cholecalciferol, vitamin D3, 1,250 mcg (50,000 unit) capsule    EScitalopram oxalate (LEXAPRO) 20 MG tablet    hydrOXYchloroQUINE (PLAQUENIL) 200 mg tablet    leflunomide (ARAVA) 10 MG Tab    omeprazole (PRILOSEC) 40 MG capsule    meloxicam (MOBIC) 15 MG tablet    pregabalin (LYRICA) 75 MG capsule    temazepam (RESTORIL) 15 mg Cap

## 2022-06-07 ENCOUNTER — TELEPHONE (OUTPATIENT)
Dept: RHEUMATOLOGY | Facility: CLINIC | Age: 57
End: 2022-06-07
Payer: MEDICAID

## 2022-06-07 NOTE — TELEPHONE ENCOUNTER
Dr. Polina Jarrell from [facility] called to advise you that this patient is dr jung . He is getting medications filled at his office and is also getting medications filled with you. [facility] in [place] also called wanting to clarify if the patient is going to be taking the Temazepam  Or Ambien . Please Advise.. Thanks

## 2022-06-15 ENCOUNTER — CLINICAL SUPPORT (OUTPATIENT)
Dept: REHABILITATION | Facility: HOSPITAL | Age: 57
End: 2022-06-15
Payer: MEDICAID

## 2022-06-15 DIAGNOSIS — R29.898 DECREASED STRENGTH OF UPPER EXTREMITY: ICD-10-CM

## 2022-06-15 DIAGNOSIS — G89.29 CHRONIC RIGHT SHOULDER PAIN: ICD-10-CM

## 2022-06-15 DIAGNOSIS — M25.511 CHRONIC RIGHT SHOULDER PAIN: ICD-10-CM

## 2022-06-15 DIAGNOSIS — M25.611 DECREASED ROM OF RIGHT SHOULDER: ICD-10-CM

## 2022-06-15 PROCEDURE — 97162 PT EVAL MOD COMPLEX 30 MIN: CPT

## 2022-06-15 NOTE — PLAN OF CARE
"[facility]   Physical Therapy Initial Evaluation     Date: 6/15/2022   Name: Darius Restrepo  [facility] Number: [id number]    Therapy Diagnosis:   Encounter Diagnoses   Name Primary?    Decreased ROM of right shoulder     Decreased strength of upper extremity     Chronic right shoulder pain      Physician: Ventura Quintero MD    Physician Orders: PT Eval and Treat   Medical Diagnosis from Referral: R Rotator Cuff Syndrome  Evaluation Date: 6/15/2022  Authorization Period Expiration:8/12/2022  Plan of Care Expiration: 8/12/2022  Progress Note Due: 7/15/2022  Visit # / Visits authorized: 1/ 16     Precautions: Standard     Time In: 09:00 am  Time Out: 09:30 am  Total Appointment Time (timed & untimed codes): 30 minutes      SUBJECTIVE     Date of onset / History of current condition - Darius reports: his R shoulder started hurting after he did a lot of heavy lifting of objects throwing it into a trailer but did not feel a pop or pain right away. He is L hand dominant. Takes Ibuprofen and Narco for pain relief. Had a shot in shoulder about 1 month ago the reduced pain for 2-3 days. Pain is worse at night when trying to sleep and when trying to  anything. Needs help to get out of tub    Quick Dash: 84% (UE disability)  Falls: none    Imaging, x-ray 4/13/2022: mild degenerative changes of AC joint, no acute osseous abnormalities    Prior Therapy: none  Social History:  lives with their family who is willing to assist pt as needed  Occupation: not working   Prior Level of Function: Independent   Current Level of Function: assistance required with overhead activities, getting out of tub    Pain:  Current 10/10, worst 10/10, best 6/10   Location: right shoulder    Description: Tingling, Deep, Numb and cracking. constant  Aggravating Factors: any movement of R shoulder  Easing Factors: nothing    Patients goals: "find out what is wrong with his shoulder, decrease pain to use it more"   "   Medical History:   No past medical history on file.    Surgical History:   Darius Restrepo  has a past surgical history that includes Heart monitor.    Medications:   Darius has a current medication list which includes the following prescription(s): alprazolam, amlodipine, atorvastatin, cholecalciferol (vitamin d3), clonidine, clonidine 0.1 mg/24 hr td ptwk, clonidine 0.3 mg/24 hr td ptwk, clopidogrel, escitalopram oxalate, hydralazine, hydrochlorothiazide, hydrocodone-acetaminophen, hydroxychloroquine, leflunomide, levetiracetam, lisinopril-hydrochlorothiazide, meloxicam, nicotine, olanzapine, omeprazole, pregabalin, quetiapine, risperidone, temazepam, and tramadol.    Allergies:   Review of patient's allergies indicates:  No Known Allergies       OBJECTIVE     R shoulder PROM: flexion 120, abduction 100, ER 65, IR 70 degrees  R shoulder strength: 2-/5 MMT throughout  R elbow strength: 4/5 MMT  Tender to palpation: anterior shoulder  Crepitus / popping with R shoulder movements  (+) Empty Can  (+) Open Can  (+) Painful Arc  (+) Lag test    TREATMENT     Total Treatment time (time-based codes) separate from Evaluation: 5 minutes      Darius received the treatments listed below:      therapeutic exercises to develop strength and ROM for 5 minutes including:    manual therapy techniques: Joint mobilizations and Soft tissue Mobilization were applied to the: R shoulder for 0 minutes, including:    Therapeutic Exercise Grid     Exercise 1  Exercise 2  Exercise 3  Exercise 4    Exercise :    Pendulum or pulley Table ROM: flexion, abduction, ER Wall ROM: semi-circles Isometric: ER, IR, flexion, extension, abduction, adduction   Repetition/Time :                  Resist or Assist :                  Comment :                Done :                                Exercise 5  Exercise 6  Exercise 7  Exercise 8    Exercise :                  Repetition/Time :                  Resist or Assist :                  Comment :                   Done :                                  PATIENT EDUCATION AND HOME EXERCISES     Education provided / Written Home Exercises Provided  Exercises and stretches demonstrated and initiated, written HEP issued, educated pt on importance and benefits of exercises and stretches, and encouraged pt to continue with HEP daily. Pt voiced understanding    ASSESSMENT     Darius is a 57 y.o. male referred to outpatient Physical Therapy with a medical diagnosis of R Rotator Cuff Syndrome. Patient presents with decreased R shoulder ROM and strength limiting ADLs and IADLs. Pt has popping in R shoulder with all movements and tenderness in anterior R shoulder. Discussed goal of PT is to improve R shoulder ROM and strength regardless of exact diagnosis to improve function and use. Pt will need CT or MRI to determine exact pathology    Patient prognosis is Fair.   Patient will benefit from skilled outpatient Physical Therapy to address the deficits stated above and in the chart below, provide patient /family education, and to maximize patientt's level of independence.     Plan of care discussed with patient: Yes  Patient's spiritual, cultural and educational needs considered and patient is agreeable to the plan of care and goals as stated below:     Anticipated Barriers for therapy: chronic R shoulder pain    Goals:  Short Term Goals: 4 weeks     Pt will demonstrate knowledge and independence with HEP to continue with exercises outside of therapy    Reduce c/o pain in R shoulder to < 4/10 pain level with daily activities    Improve R shoulder ROM to 140 degrees of flexion, 120 degrees of abduction, 80 degrees of internal rotation, 75 degrees of external rotation to perform overhead reaching    Improve R shoulder strength to 3-/5 MMT throughout in order to improve tolerance with ADLs and IADLs    Long Term Goals: 8 weeks     Pt will improve score on Quick Dash by 10 points which indicates improved ability to perform daily tasks  with less difficulty and pain    Improve R shoulder ROM to 140 degrees of flexion, 120 degrees of abduction, 80 degrees of internal rotation, 75 degrees of external rotation to perform overhead reaching    Improve R shoulder strength to 3-/5 MMT throughout in order to improve tolerance with ADLs and IADLs    PLAN     Outpatient Physical Therapy 2 times weekly for 8 weeks to include the following interventions: Manual Therapy, Patient Education and Therapeutic Exercise.     Elva Dotson, PT      I CERTIFY THE NEED FOR THESE SERVICES FURNISHED UNDER THIS PLAN OF TREATMENT AND WHILE UNDER MY CARE   Physician's comments:     Physician's Signature: ___________________________________________________

## 2022-06-20 ENCOUNTER — CLINICAL SUPPORT (OUTPATIENT)
Dept: REHABILITATION | Facility: HOSPITAL | Age: 57
End: 2022-06-20
Payer: MEDICAID

## 2022-06-20 DIAGNOSIS — G89.29 CHRONIC RIGHT SHOULDER PAIN: ICD-10-CM

## 2022-06-20 DIAGNOSIS — M25.611 DECREASED ROM OF RIGHT SHOULDER: Primary | ICD-10-CM

## 2022-06-20 DIAGNOSIS — R29.898 DECREASED STRENGTH OF UPPER EXTREMITY: ICD-10-CM

## 2022-06-20 DIAGNOSIS — M25.511 CHRONIC RIGHT SHOULDER PAIN: ICD-10-CM

## 2022-06-20 PROCEDURE — 97110 THERAPEUTIC EXERCISES: CPT

## 2022-06-20 NOTE — PROGRESS NOTES
[facility]   Physical Therapy Treatment Note     Name: Darius Restrepo  [facility] Number: [id number]    Therapy Diagnosis:   Encounter Diagnoses   Name Primary?    Decreased ROM of right shoulder Yes    Decreased strength of upper extremity     Chronic right shoulder pain      Physician: Ventura Quintero MD    Authorization Period Expiration:8/12/2022  Plan of Care Expiration: 8/12/2022  Progress Note Due: 7/15/2022  Visit # / Visits authorized: 2/16     Visit Date: 6/20/2022    Time In: 10:30   Time Out: 11:00  Total Billable Time: 30 minutes    SUBJECTIVE     Pt reports: his R shoulder hurt when doing his exercises   He was compliant with home exercise program.  Response to previous treatment: good  Functional change: none    Pain: 8/10 with Tylenol  Location: right shoulder      OBJECTIVE     Objective Measures updated at progress report unless specified.     Treatment     Darius received the treatments listed below:      therapeutic exercises to develop strength, endurance and ROM for 30 minutes including:    Therapeutic Exercise Grid     Exercise 1  Exercise 2  Exercise 3  Exercise 4    Exercise :    Pendulum or pulley Table ROM: flexion, abduction, ER Wall ROM: semi-circles Isometric: ER, IR, flexion, extension, abduction, adduction   Repetition/Time :    3 min   10   10   10     Resist or Assist :                  Comment :                 Done :    yes  yes   yes   yes                      Exercise 5  Exercise 6  Exercise 7  Exercise 8    Exercise :    NuStep UE only              Repetition/Time :    5 min              Resist or Assist :    [id number]              Comment :                  Done :    yes                              Patient Education and Home Exercises     Home Exercises Provided and Patient Education Provided     Education provided: Educated pt on importance of HEP and encouraged pt to continue daily    ASSESSMENT     Pt tolerated initiation of R shoulder ROM and  strengthening exercises well with continued pain with cues required for proper technique to isolate specific muscles.     Darius Is progressing fair towards his goals.   Pt prognosis is Fair.     Pt will continue to benefit from skilled outpatient physical therapy to address the deficits listed in the problem list box on initial evaluation, provide pt/family education and to maximize pt's level of independence in the home and community environment.     Pt's spiritual, cultural and educational needs considered and pt agreeable to plan of care and goals.     Anticipated barriers to physical therapy: chronic R shoulder pain     Short Term Goals: 4 weeks      Pt will demonstrate knowledge and independence with HEP to continue with exercises outside of therapy     Reduce c/o pain in R shoulder to < 4/10 pain level with daily activities     Improve R shoulder ROM to 140 degrees of flexion, 120 degrees of abduction, 80 degrees of internal rotation, 75 degrees of external rotation to perform overhead reaching     Improve R shoulder strength to 3-/5 MMT throughout in order to improve tolerance with ADLs and IADLs     Long Term Goals: 8 weeks      Pt will improve score on Quick Dash by 10 points which indicates improved ability to perform daily tasks with less difficulty and pain     Improve R shoulder ROM to 140 degrees of flexion, 120 degrees of abduction, 80 degrees of internal rotation, 75 degrees of external rotation to perform overhead reaching     Improve R shoulder strength to 3-/5 MMT throughout in order to improve tolerance with ADLs and IADLs      PLAN     Continue with current Plan of Care and progress per pt's tolerance    Elva Dotson PT

## 2022-06-22 ENCOUNTER — CLINICAL SUPPORT (OUTPATIENT)
Dept: REHABILITATION | Facility: HOSPITAL | Age: 57
End: 2022-06-22
Payer: MEDICAID

## 2022-06-22 DIAGNOSIS — R29.898 DECREASED STRENGTH OF UPPER EXTREMITY: ICD-10-CM

## 2022-06-22 DIAGNOSIS — M25.611 DECREASED ROM OF RIGHT SHOULDER: Primary | ICD-10-CM

## 2022-06-22 DIAGNOSIS — G89.29 CHRONIC RIGHT SHOULDER PAIN: ICD-10-CM

## 2022-06-22 DIAGNOSIS — M25.511 CHRONIC RIGHT SHOULDER PAIN: ICD-10-CM

## 2022-06-22 PROCEDURE — 97110 THERAPEUTIC EXERCISES: CPT | Mod: CQ

## 2022-06-22 NOTE — PROGRESS NOTES
[facility]   Physical Therapy Treatment Note     Name: Darius Restrepo  [facility] Number: [id number]    Therapy Diagnosis:   Encounter Diagnoses   Name Primary?    Decreased ROM of right shoulder Yes    Decreased strength of upper extremity     Chronic right shoulder pain      Physician: Ventura Quintero MD    Authorization Period Expiration:8/12/2022  Plan of Care Expiration: 8/12/2022  Progress Note Due: 7/15/2022  Visit # / Visits authorized: 3/16     PTA Visit: 1/5    Visit Date: 6/22/2022    Time In: 10:30   Time Out: 11:00  Total Billable Time: 30 minutes    SUBJECTIVE     Pt reports: Had increased pain after last session for about a day and a half.   He was compliant with home exercise program.  Response to previous treatment: good  Functional change: none    Pain: 7-8/10 with Tylenol  Location: right shoulder      OBJECTIVE     Objective Measures updated at progress report unless specified.     Treatment     Darius received the treatments listed below:      therapeutic exercises to develop strength, endurance and ROM for 30 minutes including:    Therapeutic Exercise Grid     Exercise 1  Exercise 2  Exercise 3  Exercise 4    Exercise :    Pendulum or pulley Table ROM: flexion, abduction, ER Wall ROM: semi-circles Isometric: ER, IR, flexion, extension, abduction, adduction   Repetition/Time :    3 min   10   10   10     Resist or Assist :                  Comment :                 Done :    yes  yes   yes   yes                      Exercise 5  Exercise 6  Exercise 7  Exercise 8    Exercise :    NuStep UE only              Repetition/Time :    5 min              Resist or Assist :    L4              Comment :                  Done :    yes                              Patient Education and Home Exercises     Home Exercises Provided and Patient Education Provided     Education provided: Educated pt on importance of HEP and encouraged pt to continue daily    ASSESSMENT     Pt showed good  retention of exercises, needing occasional verbal cues for form to facilitate isolation of specific muscles. He had pain at end of available range with AAROM on table.     Darius Is progressing fair towards his goals.   Pt prognosis is Fair.     Pt will continue to benefit from skilled outpatient physical therapy to address the deficits listed in the problem list box on initial evaluation, provide pt/family education and to maximize pt's level of independence in the home and community environment.     Pt's spiritual, cultural and educational needs considered and pt agreeable to plan of care and goals.     Anticipated barriers to physical therapy: chronic R shoulder pain     Short Term Goals: 4 weeks      Pt will demonstrate knowledge and independence with HEP to continue with exercises outside of therapy     Reduce c/o pain in R shoulder to < 4/10 pain level with daily activities     Improve R shoulder ROM to 140 degrees of flexion, 120 degrees of abduction, 80 degrees of internal rotation, 75 degrees of external rotation to perform overhead reaching     Improve R shoulder strength to 3-/5 MMT throughout in order to improve tolerance with ADLs and IADLs     Long Term Goals: 8 weeks      Pt will improve score on Quick Dash by 10 points which indicates improved ability to perform daily tasks with less difficulty and pain     Improve R shoulder ROM to 140 degrees of flexion, 120 degrees of abduction, 80 degrees of internal rotation, 75 degrees of external rotation to perform overhead reaching     Improve R shoulder strength to 3-/5 MMT throughout in order to improve tolerance with ADLs and IADLs      PLAN     Continue with current Plan of Care and progress per pt's tolerance    Riccardo Berman, PTA

## 2022-06-27 ENCOUNTER — CLINICAL SUPPORT (OUTPATIENT)
Dept: REHABILITATION | Facility: HOSPITAL | Age: 57
End: 2022-06-27
Payer: MEDICAID

## 2022-06-27 DIAGNOSIS — M25.611 DECREASED ROM OF RIGHT SHOULDER: Primary | ICD-10-CM

## 2022-06-27 DIAGNOSIS — G89.29 CHRONIC RIGHT SHOULDER PAIN: ICD-10-CM

## 2022-06-27 DIAGNOSIS — M25.511 CHRONIC RIGHT SHOULDER PAIN: ICD-10-CM

## 2022-06-27 DIAGNOSIS — R29.898 DECREASED STRENGTH OF UPPER EXTREMITY: ICD-10-CM

## 2022-06-27 PROCEDURE — 97110 THERAPEUTIC EXERCISES: CPT

## 2022-06-27 NOTE — PROGRESS NOTES
[facility]   Physical Therapy Treatment Note     Name: Darius Restrepo  Clinic Number: [id number]    Therapy Diagnosis:   Encounter Diagnoses   Name Primary?    Decreased ROM of right shoulder Yes    Decreased strength of upper extremity     Chronic right shoulder pain      Physician: Ventura Quintero MD    Authorization Period Expiration:8/12/2022  Plan of Care Expiration: 8/12/2022  Progress Note Due: 7/15/2022  Visit # / Visits authorized: 4/16     Visit Date: 6/27/2022    Time In: 9:25   Time Out: 10:00  Total Billable Time: 35 minutes    SUBJECTIVE     Pt reports: his R shoulder hurt a lot especially when he raises it up and when trying to sleep. He called MD due to his pain because he wanted another shot   He was compliant with home exercise program.  Response to previous treatment: good  Functional change: none    Pain: 8/10 without Tylenol yet today  Location: right shoulder      OBJECTIVE     Objective Measures updated at progress report unless specified.     Treatment     Darius received the treatments listed below:      therapeutic exercises to develop strength, endurance and ROM for 30 minutes including:    Therapeutic Exercise Grid     Exercise 1  Exercise 2  Exercise 3  Exercise 4    Exercise :    Pendulum or pulley Table ROM: flexion, scaption, ER Wall ROM: semi-circles, flexion Isometric: ER, IR, flexion, extension, abduction, adduction   Repetition/Time :    3 min   10   10   10     Resist or Assist :                  Comment :                 Done :    yes  yes   yes   yes                      Exercise 5  Exercise 6  Exercise 7  Exercise 8    Exercise :    NuStep UE only   Belly press   Protraction on elbows   B upper trap stretch     Repetition/Time :    5 min   10 x 3 sec hold   10   5 x 15 sec hold     Resist or Assist :    L4              Comment :                  Done :    no   yes   yes   yes                     Patient Education and Home Exercises     Home  Exercises Provided and Patient Education Provided     Education provided: Educated pt on importance of HEP and encouraged pt to continue daily    ASSESSMENT     Pt performed R shoulder ROM and strengthening exercises with good effort however continued pain with overhead movements with cues required for proper technique to isolate specific muscles. Added upper trap stretch to POC due to c/o pain into his neck area    Darius Is progressing fair towards his goals.   Pt prognosis is Fair.     Pt will continue to benefit from skilled outpatient physical therapy to address the deficits listed in the problem list box on initial evaluation, provide pt/family education and to maximize pt's level of independence in the home and community environment.     Pt's spiritual, cultural and educational needs considered and pt agreeable to plan of care and goals.     Anticipated barriers to physical therapy: chronic R shoulder pain     Short Term Goals: 4 weeks      Pt will demonstrate knowledge and independence with HEP to continue with exercises outside of therapy     Reduce c/o pain in R shoulder to < 4/10 pain level with daily activities     Improve R shoulder ROM to 140 degrees of flexion, 120 degrees of abduction, 80 degrees of internal rotation, 75 degrees of external rotation to perform overhead reaching     Improve R shoulder strength to 3-/5 MMT throughout in order to improve tolerance with ADLs and IADLs     Long Term Goals: 8 weeks      Pt will improve score on Quick Dash by 10 points which indicates improved ability to perform daily tasks with less difficulty and pain     Improve R shoulder ROM to 140 degrees of flexion, 120 degrees of abduction, 80 degrees of internal rotation, 75 degrees of external rotation to perform overhead reaching     Improve R shoulder strength to 3-/5 MMT throughout in order to improve tolerance with ADLs and IADLs      PLAN     Continue with current Plan of Care and progress per pt's  tolerance    Elva Mauri, PT

## 2022-06-29 ENCOUNTER — TELEPHONE (OUTPATIENT)
Dept: RHEUMATOLOGY | Facility: CLINIC | Age: 57
End: 2022-06-29
Payer: MEDICAID

## 2022-06-29 DIAGNOSIS — F51.01 PRIMARY INSOMNIA: ICD-10-CM

## 2022-06-29 DIAGNOSIS — M75.100 TEAR OF ROTATOR CUFF, UNSPECIFIED LATERALITY, UNSPECIFIED TEAR EXTENT, UNSPECIFIED WHETHER TRAUMATIC: ICD-10-CM

## 2022-06-29 DIAGNOSIS — M79.7 FIBROMYALGIA: ICD-10-CM

## 2022-06-29 DIAGNOSIS — F32.A DEPRESSIVE DISORDER: ICD-10-CM

## 2022-06-29 DIAGNOSIS — I10 PRIMARY HYPERTENSION: ICD-10-CM

## 2022-06-29 DIAGNOSIS — M35.9 UNDIFFERENTIATED CONNECTIVE TISSUE DISEASE: ICD-10-CM

## 2022-06-29 NOTE — TELEPHONE ENCOUNTER
Please Advise.. Thanks     ----- Message from Regina Sepulveda sent at 6/28/2022  1:42 PM CDT -----  Regarding: Medication dosage  Patient called stating that on his last visit with Dr. Crisostomo, he was informed by him that he would increase his dosage for the Norco prescription... Please Advise... [facility] .... Hema Cho

## 2022-07-01 ENCOUNTER — CLINICAL SUPPORT (OUTPATIENT)
Dept: REHABILITATION | Facility: HOSPITAL | Age: 57
End: 2022-07-01
Payer: MEDICAID

## 2022-07-01 ENCOUNTER — TELEPHONE (OUTPATIENT)
Dept: RHEUMATOLOGY | Facility: CLINIC | Age: 57
End: 2022-07-01
Payer: MEDICAID

## 2022-07-01 DIAGNOSIS — M75.100 TEAR OF ROTATOR CUFF, UNSPECIFIED LATERALITY, UNSPECIFIED TEAR EXTENT, UNSPECIFIED WHETHER TRAUMATIC: ICD-10-CM

## 2022-07-01 DIAGNOSIS — M25.511 CHRONIC RIGHT SHOULDER PAIN: ICD-10-CM

## 2022-07-01 DIAGNOSIS — M25.611 DECREASED ROM OF RIGHT SHOULDER: Primary | ICD-10-CM

## 2022-07-01 DIAGNOSIS — I10 PRIMARY HYPERTENSION: ICD-10-CM

## 2022-07-01 DIAGNOSIS — F51.01 PRIMARY INSOMNIA: ICD-10-CM

## 2022-07-01 DIAGNOSIS — R29.898 DECREASED STRENGTH OF UPPER EXTREMITY: ICD-10-CM

## 2022-07-01 DIAGNOSIS — M35.9 UNDIFFERENTIATED CONNECTIVE TISSUE DISEASE: ICD-10-CM

## 2022-07-01 DIAGNOSIS — M79.7 FIBROMYALGIA: ICD-10-CM

## 2022-07-01 DIAGNOSIS — G89.29 CHRONIC RIGHT SHOULDER PAIN: ICD-10-CM

## 2022-07-01 DIAGNOSIS — F32.A DEPRESSIVE DISORDER: ICD-10-CM

## 2022-07-01 PROCEDURE — 97110 THERAPEUTIC EXERCISES: CPT | Mod: CQ

## 2022-07-01 NOTE — PROGRESS NOTES
[facility]   Physical Therapy Treatment Note     Name: Darius Restrepo  Clinic Number: [id number]    Therapy Diagnosis:   No diagnosis found.  Physician: Ventura Quintero MD    Authorization Period Expiration:8/12/2022  Plan of Care Expiration: 8/12/2022  Progress Note Due: 7/15/2022  Visit # / Visits authorized: 5/16     PTA Visit: 1/5    Visit Date: 7/1/2022    Time In: 9:28  Time Out: 0958  Total Billable Time: 30 minutes    SUBJECTIVE     Pt reports: Pt will see his doctor on the 7th to possibly get another shot. States his R shoulder pain is increased today.   He was compliant with home exercise program.  Response to previous treatment: good  Functional change: none    Pain: 7/10 without Tylenol yet today  Location: right shoulder      OBJECTIVE     Objective Measures updated at progress report unless specified.     Treatment     Darius received the treatments listed below:      therapeutic exercises to develop strength, endurance and ROM for 30 minutes including:    Therapeutic Exercise Grid     Exercise 1  Exercise 2  Exercise 3  Exercise 4    Exercise :    Pendulum or pulley Table ROM: flexion, scaption, ER Wall ROM: semi-circles, flexion Isometric: ER, IR, flexion, extension, abduction, adduction   Repetition/Time :    3 min   15   10   10     Resist or Assist :                  Comment :                 Done :    yes  yes   yes   yes                      Exercise 5  Exercise 6  Exercise 7  Exercise 8    Exercise :    NuStep UE only   Belly press   Protraction on elbows   B upper trap stretch     Repetition/Time :    5 min   10 x 3 sec hold   10   5 x 15 sec hold     Resist or Assist :    L4              Comment :                  Done :    yes   yes   yes   yes                     Patient Education and Home Exercises     Home Exercises Provided and Patient Education Provided     Education provided: Educated pt on importance of HEP and encouraged pt to continue daily    ASSESSMENT      Increased reps of table ROM with good tolerance. Continues to have trouble raising R arm above his head and cannot perform L upper trap stretch due to this limitation. He will ses his doctor on the 7th of this month to talk about getting another shot.     Darius Is progressing fair towards his goals.   Pt prognosis is Fair.     Pt will continue to benefit from skilled outpatient physical therapy to address the deficits listed in the problem list box on initial evaluation, provide pt/family education and to maximize pt's level of independence in the home and community environment.     Pt's spiritual, cultural and educational needs considered and pt agreeable to plan of care and goals.     Anticipated barriers to physical therapy: chronic R shoulder pain     Short Term Goals: 4 weeks      Pt will demonstrate knowledge and independence with HEP to continue with exercises outside of therapy     Reduce c/o pain in R shoulder to < 4/10 pain level with daily activities     Improve R shoulder ROM to 140 degrees of flexion, 120 degrees of abduction, 80 degrees of internal rotation, 75 degrees of external rotation to perform overhead reaching     Improve R shoulder strength to 3-/5 MMT throughout in order to improve tolerance with ADLs and IADLs     Long Term Goals: 8 weeks      Pt will improve score on Quick Dash by 10 points which indicates improved ability to perform daily tasks with less difficulty and pain     Improve R shoulder ROM to 140 degrees of flexion, 120 degrees of abduction, 80 degrees of internal rotation, 75 degrees of external rotation to perform overhead reaching     Improve R shoulder strength to 3-/5 MMT throughout in order to improve tolerance with ADLs and IADLs      PLAN     Continue with current Plan of Care and progress per pt's tolerance    Riccardo Berman, PTA

## 2022-07-01 NOTE — TELEPHONE ENCOUNTER
Patient called stating that being that he goes to physical   Therapy you would change his norco from 2 times daily to 3 times daily. [facility] , Hema Cho    Please Advise.. Thanks

## 2022-07-04 ENCOUNTER — HOSPITAL ENCOUNTER (EMERGENCY)
Facility: HOSPITAL | Age: 57
Discharge: HOME | End: 2022-07-05
Attending: STUDENT IN AN ORGANIZED HEALTH CARE EDUCATION/TRAINING PROGRAM
Payer: MEDICAID

## 2022-07-04 DIAGNOSIS — T78.40XA ALLERGIC REACTION, INITIAL ENCOUNTER: Primary | ICD-10-CM

## 2022-07-04 DIAGNOSIS — R60.9 SWELLING: ICD-10-CM

## 2022-07-04 PROCEDURE — 96372 THER/PROPH/DIAG INJ SC/IM: CPT | Performed by: STUDENT IN AN ORGANIZED HEALTH CARE EDUCATION/TRAINING PROGRAM

## 2022-07-04 PROCEDURE — 63600175 PHARM REV CODE 636 W HCPCS: Performed by: STUDENT IN AN ORGANIZED HEALTH CARE EDUCATION/TRAINING PROGRAM

## 2022-07-04 PROCEDURE — 25000003 PHARM REV CODE 250: Performed by: STUDENT IN AN ORGANIZED HEALTH CARE EDUCATION/TRAINING PROGRAM

## 2022-07-04 PROCEDURE — 99284 EMERGENCY DEPT VISIT MOD MDM: CPT | Mod: 25

## 2022-07-04 RX ORDER — FAMOTIDINE 20 MG/1
20 TABLET, FILM COATED ORAL
Status: COMPLETED | OUTPATIENT
Start: 2022-07-04 | End: 2022-07-04

## 2022-07-04 RX ORDER — DIPHENHYDRAMINE HCL 50 MG
50 CAPSULE ORAL
Status: COMPLETED | OUTPATIENT
Start: 2022-07-04 | End: 2022-07-04

## 2022-07-04 RX ORDER — EPINEPHRINE 1 MG/ML
0.3 INJECTION, SOLUTION INTRACARDIAC; INTRAMUSCULAR; INTRAVENOUS; SUBCUTANEOUS ONCE
Status: COMPLETED | OUTPATIENT
Start: 2022-07-04 | End: 2022-07-04

## 2022-07-04 RX ADMIN — FAMOTIDINE 20 MG: 20 TABLET ORAL at 11:07

## 2022-07-04 RX ADMIN — EPINEPHRINE 0.3 MG: 1 INJECTION, SOLUTION, CONCENTRATE INTRAVENOUS at 11:07

## 2022-07-04 RX ADMIN — METHYLPREDNISOLONE SODIUM SUCCINATE 80 MG: 40 INJECTION, POWDER, FOR SOLUTION INTRAMUSCULAR; INTRAVENOUS at 11:07

## 2022-07-04 RX ADMIN — DIPHENHYDRAMINE HYDROCHLORIDE 50 MG: 50 CAPSULE ORAL at 11:07

## 2022-07-05 ENCOUNTER — CLINICAL SUPPORT (OUTPATIENT)
Dept: REHABILITATION | Facility: HOSPITAL | Age: 57
End: 2022-07-05
Payer: MEDICAID

## 2022-07-05 VITALS
RESPIRATION RATE: 21 BRPM | OXYGEN SATURATION: 91 % | DIASTOLIC BLOOD PRESSURE: 83 MMHG | HEART RATE: 96 BPM | SYSTOLIC BLOOD PRESSURE: 148 MMHG | TEMPERATURE: 98 F

## 2022-07-05 DIAGNOSIS — M25.511 CHRONIC RIGHT SHOULDER PAIN: ICD-10-CM

## 2022-07-05 DIAGNOSIS — G89.29 CHRONIC RIGHT SHOULDER PAIN: ICD-10-CM

## 2022-07-05 DIAGNOSIS — R29.898 DECREASED STRENGTH OF UPPER EXTREMITY: ICD-10-CM

## 2022-07-05 DIAGNOSIS — M25.611 DECREASED ROM OF RIGHT SHOULDER: Primary | ICD-10-CM

## 2022-07-05 PROCEDURE — 97110 THERAPEUTIC EXERCISES: CPT | Mod: CQ

## 2022-07-05 RX ORDER — PREDNISONE 20 MG/1
40 TABLET ORAL DAILY
Qty: 8 TABLET | Refills: 0 | Status: SHIPPED | OUTPATIENT
Start: 2022-07-05 | End: 2022-07-09

## 2022-07-05 RX ORDER — EPINEPHRINE 0.3 MG/.3ML
1 INJECTION SUBCUTANEOUS
Qty: 1 EACH | Refills: 1 | Status: SHIPPED | OUTPATIENT
Start: 2022-07-05 | End: 2023-09-28

## 2022-07-05 RX ORDER — HYDROCODONE BITARTRATE AND ACETAMINOPHEN 10; 325 MG/1; MG/1
1 TABLET ORAL 3 TIMES DAILY PRN
Qty: 90 TABLET | Refills: 0 | Status: SHIPPED | OUTPATIENT
Start: 2022-07-05 | End: 2022-08-11

## 2022-07-05 NOTE — ED PROVIDER NOTES
Encounter Date: 7/4/2022       History     Chief Complaint   Patient presents with    Dysphagia     Pt c/o troubl;e swallowing x1 hour now-denies throat pain or fever- denies food bolus; can swallow water- free of any drooling     Patient is a 57-year-old  male presents to the ER today due to trouble swallowing.  Patient states approximately 1 hour prior to arrival he ate a Brownie with peanuts on her.  Patient states he has had this food multiple times in the past without issue.  Patient states that he had trouble swallowing after.  Patient denies any airway compromise, urticarial type rash, pruritus.  Patient denies any fevers, chills, cough, congestion, chest pain, shortness of breath, abdominal pain.  Patient states that he was able to swallow water afterwards but just felt that his throat did not feel right.  Patient denies any drooling.        Review of patient's allergies indicates:  No Known Allergies  No past medical history on file.  Past Surgical History:   Procedure Laterality Date    Heart monitor      Heart Monitor Put in      No family history on file.  Social History     Tobacco Use    Smoking status: Current Every Day Smoker     Packs/day: 0.50    Smokeless tobacco: Never Used    Tobacco comment: Nicotine patches used    Substance Use Topics    Alcohol use: Never    Drug use: Never     Review of Systems   Constitutional: Negative for chills, fatigue and fever.   HENT: Positive for trouble swallowing. Negative for congestion and sore throat.    Eyes: Negative for pain and visual disturbance.   Respiratory: Negative for cough, shortness of breath and wheezing.    Cardiovascular: Negative for chest pain and palpitations.   Gastrointestinal: Negative for abdominal pain, blood in stool, constipation, diarrhea, nausea and vomiting.   Genitourinary: Negative for dysuria and hematuria.   Musculoskeletal: Negative for back pain and myalgias.   Skin: Negative for rash and wound.    Neurological: Negative for seizures, syncope and headaches.   Psychiatric/Behavioral: Negative for confusion. The patient is not nervous/anxious.        Physical Exam     Initial Vitals [07/04/22 2218]   BP Pulse Resp Temp SpO2   (!) 159/87 (!) 115 20 98.2 °F (36.8 °C) 98 %      MAP       --         Physical Exam    Nursing note and vitals reviewed.  Constitutional: He appears well-developed and well-nourished. No distress.   HENT:   Head: Normocephalic and atraumatic.   Mouth:  No oral swelling on exam.  No foreign bodies present in the posterior oropharynx.  No stridor when auscultating the neck.   Eyes: Conjunctivae and EOM are normal. Right eye exhibits no discharge. Left eye exhibits no discharge. No scleral icterus.   Neck: No tracheal deviation present.   Normal range of motion.  Cardiovascular: Normal rate, regular rhythm, normal heart sounds and intact distal pulses. Exam reveals no gallop and no friction rub.    No murmur heard.  Pulmonary/Chest: Breath sounds normal. No respiratory distress. He has no wheezes. He has no rhonchi. He has no rales.   Abdominal: Abdomen is soft. He exhibits no distension. There is no abdominal tenderness. There is no guarding.   Musculoskeletal:         General: No tenderness or edema. Normal range of motion.      Cervical back: Normal range of motion.     Neurological: He is alert. He has normal strength. No cranial nerve deficit.   Skin: Skin is warm and dry. No rash and no abscess noted. No erythema. No pallor.   Psychiatric: His behavior is normal. Judgment normal.         ED Course   Procedures  Labs Reviewed - No data to display       Imaging Results          X-Ray Neck Soft Tissue (In process)               X-Rays:   Independently Interpreted Readings:   Other Readings:  X-ray neck:  No acute findings    Medications   EPINEPHrine injection 0.3 mg (0.3 mg Intramuscular Given 7/4/22 9481)   methylPREDNISolone sodium succinate injection 80 mg (80 mg Intramuscular Given  7/4/22 2344)   famotidine tablet 20 mg (20 mg Oral Given 7/4/22 2344)   diphenhydrAMINE capsule 50 mg (50 mg Oral Given 7/4/22 2344)     Medical Decision Making:   Initial Assessment:   Overall well-appearing 57-year-old male  ED Management:  Vital signs stable  SpO2 within normal limits  Patient is afebrile  Neck an oral exam are unremarkable  I have a low suspicion for anaphylaxis in this patient however given his symptoms I will treat as such  Epinephrine given, steroids given, Benadryl given, Pepcid given  Symptoms significantly improved  EpiPen show be sent to pharmacy  Patient was monitored for greater than 3 hours after the epinephrine was administered in showed no signs of relapse or adverse effects from the epinephrine  Prednisone will be continued for the next 5 days  Advised patient to follow-up with his PCP indeterminate allergy skin testing is necessary  Advised patient on avoid this kind of brownies in the future                      Clinical Impression:   Final diagnoses:  [R60.9] Swelling  [R60.9] Swelling - throat swell  [T78.40XA] Allergic reaction, initial encounter (Primary)          ED Disposition Condition    Discharge Stable        ED Prescriptions     Medication Sig Dispense Start Date End Date Auth. Provider    EPINEPHrine (EPIPEN) 0.3 mg/0.3 mL AtIn Inject 0.3 mLs (0.3 mg total) into the muscle as needed (allergic reaction). 1 each 7/5/2022 7/5/2023 Titi Currie MD    predniSONE (DELTASONE) 20 MG tablet Take 2 tablets (40 mg total) by mouth once daily. for 4 days 8 tablet 7/5/2022 7/9/2022 Titi Currie MD        Follow-up Information     Follow up With Specialties Details Why Contact Info    [facility] Emergency Dept Emergency Medicine  If symptoms worsen [address] [facility] [address] [phone]           Titi Currie MD  07/05/22 0218       Titi Currie MD  07/05/22 3389

## 2022-07-05 NOTE — ED NOTES
Amb to ED 6 c/o difficulty swallowing started around 9 pm ate a fudge brownie started on new seizure med Friday respirations unlabored BBS clear skin warm dry

## 2022-07-05 NOTE — PROGRESS NOTES
[facility]   Physical Therapy Treatment Note     Name: Darius Restrepo  Clinic Number: [id number]    Therapy Diagnosis:   No diagnosis found.  Physician: Ventura Quintero MD    Authorization Period Expiration:8/12/2022  Plan of Care Expiration: 8/12/2022  Progress Note Due: 7/15/2022  Visit # / Visits authorized: 6/16     PTA Visit: 2/5    Visit Date: 7/5/2022    Time In: 1412  Time Out: 1435  Total Billable Time: 23 minutes    SUBJECTIVE     Pt reports: He was rushed to the ER last night due to an allergic reaction to medication. His airway was swollen and he was unable to swallow and had difficulty breathing. He has followed up with his doctor to determine the interaction that caused the incident. R shoulder pain is increased today, and pt had very little sleep.     He was compliant with home exercise program.  Response to previous treatment: good  Functional change: none    Pain: 7/10 without Tylenol yet today  Location: right shoulder      OBJECTIVE     Objective Measures updated at progress report unless specified.     Treatment     Darius received the treatments listed below:      therapeutic exercises to develop strength, endurance and ROM for 23 minutes including:    Therapeutic Exercise Grid     Exercise 1  Exercise 2  Exercise 3  Exercise 4    Exercise :    Pendulum or pulley Table ROM: flexion, scaption, ER Wall ROM: semi-circles, flexion Isometric: ER, IR, flexion, extension, abduction, adduction   Repetition/Time :    3 min   15   10   15 ea     Resist or Assist :                  Comment :                 Done :    yes  yes   yes   yes                      Exercise 5  Exercise 6  Exercise 7  Exercise 8    Exercise :    NuStep UE only   Belly press   Protraction on elbows   B upper trap stretch     Repetition/Time :    7 min   10 x 3 sec hold   15   5 x 15 sec hold     Resist or Assist :    L4              Comment :                  Done :    yes   yes   yes   no                      Patient Education and Home Exercises     Home Exercises Provided and Patient Education Provided     Education provided: Educated pt on importance of HEP and encouraged pt to continue daily    ASSESSMENT     Increased reps of isometric strengthening exercises, time on NuStep, and SA elbow press with good tolerance. Pt had no lingering effects of his episode last night other than being tired.  Darius Is progressing fair towards his goals.   Pt prognosis is Fair.     Pt will continue to benefit from skilled outpatient physical therapy to address the deficits listed in the problem list box on initial evaluation, provide pt/family education and to maximize pt's level of independence in the home and community environment.     Pt's spiritual, cultural and educational needs considered and pt agreeable to plan of care and goals.     Anticipated barriers to physical therapy: chronic R shoulder pain     Short Term Goals: 4 weeks      Pt will demonstrate knowledge and independence with HEP to continue with exercises outside of therapy     Reduce c/o pain in R shoulder to < 4/10 pain level with daily activities     Improve R shoulder ROM to 140 degrees of flexion, 120 degrees of abduction, 80 degrees of internal rotation, 75 degrees of external rotation to perform overhead reaching     Improve R shoulder strength to 3-/5 MMT throughout in order to improve tolerance with ADLs and IADLs     Long Term Goals: 8 weeks      Pt will improve score on Quick Dash by 10 points which indicates improved ability to perform daily tasks with less difficulty and pain     Improve R shoulder ROM to 140 degrees of flexion, 120 degrees of abduction, 80 degrees of internal rotation, 75 degrees of external rotation to perform overhead reaching     Improve R shoulder strength to 3-/5 MMT throughout in order to improve tolerance with ADLs and IADLs      PLAN     Continue with current Plan of Care and progress per pt's tolerance    Riccardo Berman,  PTA

## 2022-07-07 ENCOUNTER — CLINICAL SUPPORT (OUTPATIENT)
Dept: REHABILITATION | Facility: HOSPITAL | Age: 57
End: 2022-07-07
Payer: MEDICAID

## 2022-07-07 DIAGNOSIS — M25.611 DECREASED ROM OF RIGHT SHOULDER: Primary | ICD-10-CM

## 2022-07-07 DIAGNOSIS — M25.511 CHRONIC RIGHT SHOULDER PAIN: ICD-10-CM

## 2022-07-07 DIAGNOSIS — R29.898 DECREASED STRENGTH OF UPPER EXTREMITY: ICD-10-CM

## 2022-07-07 DIAGNOSIS — G89.29 CHRONIC RIGHT SHOULDER PAIN: ICD-10-CM

## 2022-07-07 PROCEDURE — 97110 THERAPEUTIC EXERCISES: CPT | Mod: CQ

## 2022-07-07 NOTE — PROGRESS NOTES
[facility]   Physical Therapy Treatment Note     Name: Darius Restrepo  Clinic Number: [id number]    Therapy Diagnosis:   Encounter Diagnoses   Name Primary?    Decreased ROM of right shoulder Yes    Decreased strength of upper extremity     Chronic right shoulder pain      Physician: Ventura Quintero MD    Authorization Period Expiration:8/12/2022  Plan of Care Expiration: 8/12/2022  Progress Note Due: 7/15/2022  Visit # / Visits authorized: 7/16     PTA Visit: 3/5     Visit Date: 7/7/2022    Time In: 1332  Time Out: 1400  Total Billable Time: 28 minutes    SUBJECTIVE     Pt reports: States he still has difficulty finding a comfortable position for sleep. Feels the weather may be negatively affecting his pain.     He was not compliant with home exercise program.  Response to previous treatment: good  Functional change: none    Pain: 7/10 without Tylenol yet today  Location: right shoulder      OBJECTIVE     Objective Measures updated at progress report unless specified.     Treatment     Darius received the treatments listed below:      therapeutic exercises to develop strength, endurance and ROM for 28 minutes including:    Therapeutic Exercise Grid     Exercise 1  Exercise 2  Exercise 3  Exercise 4    Exercise :    Pendulum or pulley Table ROM: flexion, scaption, ER Wall ROM: semi-circles, flexion Isometric: ER, IR, flexion, extension, abduction, adduction   Repetition/Time :    3 min   15   15   15 x 3 sec     Resist or Assist :                  Comment :                 Done :    yes  yes   yes   yes                      Exercise 5  Exercise 6  Exercise 7  Exercise 8    Exercise :    NuStep UE only   Belly press   Protraction on elbows   B upper trap stretch     Repetition/Time :    5 min   15 x 5 sec hold   15   5 x 15 sec hold     Resist or Assist :    L5              Comment :                  Done :    yes   yes   yes   no                     Patient Education and Home Exercises      Home Exercises Provided and Patient Education Provided     Education provided: Educated pt on importance of HEP and encouraged pt to continue daily    ASSESSMENT     Added 3 sec hold to isometric exercises, and increased level of NuStep to 5 with good tolerance. Pt pain level has not decreased since beginning therapy.     Darius Is progressing fair towards his goals.   Pt prognosis is Fair.     Pt will continue to benefit from skilled outpatient physical therapy to address the deficits listed in the problem list box on initial evaluation, provide pt/family education and to maximize pt's level of independence in the home and community environment.     Pt's spiritual, cultural and educational needs considered and pt agreeable to plan of care and goals.     Anticipated barriers to physical therapy: chronic R shoulder pain     Short Term Goals: 4 weeks      Pt will demonstrate knowledge and independence with HEP to continue with exercises outside of therapy     Reduce c/o pain in R shoulder to < 4/10 pain level with daily activities     Improve R shoulder ROM to 140 degrees of flexion, 120 degrees of abduction, 80 degrees of internal rotation, 75 degrees of external rotation to perform overhead reaching     Improve R shoulder strength to 3-/5 MMT throughout in order to improve tolerance with ADLs and IADLs     Long Term Goals: 8 weeks      Pt will improve score on Quick Dash by 10 points which indicates improved ability to perform daily tasks with less difficulty and pain     Improve R shoulder ROM to 140 degrees of flexion, 120 degrees of abduction, 80 degrees of internal rotation, 75 degrees of external rotation to perform overhead reaching     Improve R shoulder strength to 3-/5 MMT throughout in order to improve tolerance with ADLs and IADLs      PLAN     Continue with current Plan of Care and progress per pt's tolerance    Riccardo Berman, PTA

## 2022-07-12 ENCOUNTER — CLINICAL SUPPORT (OUTPATIENT)
Dept: REHABILITATION | Facility: HOSPITAL | Age: 57
End: 2022-07-12
Payer: MEDICAID

## 2022-07-12 DIAGNOSIS — M25.511 CHRONIC RIGHT SHOULDER PAIN: ICD-10-CM

## 2022-07-12 DIAGNOSIS — R29.898 DECREASED STRENGTH OF UPPER EXTREMITY: ICD-10-CM

## 2022-07-12 DIAGNOSIS — G89.29 CHRONIC RIGHT SHOULDER PAIN: ICD-10-CM

## 2022-07-12 DIAGNOSIS — M25.611 DECREASED ROM OF RIGHT SHOULDER: Primary | ICD-10-CM

## 2022-07-12 PROCEDURE — 97110 THERAPEUTIC EXERCISES: CPT | Mod: CQ

## 2022-07-12 NOTE — PROGRESS NOTES
[facility]   Physical Therapy Treatment Note     Name: Darius Restrepo  Clinic Number: [id number]    Therapy Diagnosis:   Encounter Diagnoses   Name Primary?    Decreased ROM of right shoulder Yes    Decreased strength of upper extremity     Chronic right shoulder pain      Physician: Ventura Quintero MD    Authorization Period Expiration:8/12/2022  Plan of Care Expiration: 8/12/2022  Progress Note Due: 7/15/2022  Visit # / Visits authorized: 8/16     PTA Visit: 4/5     Visit Date: 7/12/2022    Time In: 1300  Time Out: 1329  Total Billable Time: 29 minutes    SUBJECTIVE     Pt reports: Continues with R shoulder pain, especially when trying to raise arm back or overhead.   He was not compliant with home exercise program.  Response to previous treatment: good  Functional change: none    Pain: 8/10   Location: right shoulder      OBJECTIVE     Objective Measures updated at progress report unless specified.     Treatment     Darius received the treatments listed below:      therapeutic exercises to develop strength, endurance and ROM for 29 minutes including:    Therapeutic Exercise Grid     Exercise 1  Exercise 2  Exercise 3  Exercise 4    Exercise :    Pendulum or pulley Table ROM: flexion, scaption, ER Wall ROM: semi-circles, flexion Isometric: ER, IR, flexion, extension, abduction, adduction   Repetition/Time :    3 min   20   15   15 x 3 sec     Resist or Assist :                  Comment :                 Done :    yes  yes   yes   yes                      Exercise 5  Exercise 6  Exercise 7  Exercise 8    Exercise :    NuStep UE only   Belly press   Protraction on elbows   B upper trap stretch     Repetition/Time :    5 min   20 x 3 sec hold   20   5 x 15 sec hold     Resist or Assist :    L6              Comment :                  Done :    yes   yes   yes   no                     Patient Education and Home Exercises     Home Exercises Provided and Patient Education Provided      Education provided: Educated pt on importance of HEP and encouraged pt to continue daily    ASSESSMENT     Increased reps of table ROM, belly press, scapular protraction on elbows, and level of NUStep with good tolerance. Patient is not limited by pain while performing exercises. Occasional verbal cues required to avoid compensatory movements.     Darius Is progressing fair towards his goals.   Pt prognosis is Fair.     Pt will continue to benefit from skilled outpatient physical therapy to address the deficits listed in the problem list box on initial evaluation, provide pt/family education and to maximize pt's level of independence in the home and community environment.     Pt's spiritual, cultural and educational needs considered and pt agreeable to plan of care and goals.     Anticipated barriers to physical therapy: chronic R shoulder pain     Short Term Goals: 4 weeks      Pt will demonstrate knowledge and independence with HEP to continue with exercises outside of therapy     Reduce c/o pain in R shoulder to < 4/10 pain level with daily activities     Improve R shoulder ROM to 140 degrees of flexion, 120 degrees of abduction, 80 degrees of internal rotation, 75 degrees of external rotation to perform overhead reaching     Improve R shoulder strength to 3-/5 MMT throughout in order to improve tolerance with ADLs and IADLs     Long Term Goals: 8 weeks      Pt will improve score on Quick Dash by 10 points which indicates improved ability to perform daily tasks with less difficulty and pain     Improve R shoulder ROM to 140 degrees of flexion, 120 degrees of abduction, 80 degrees of internal rotation, 75 degrees of external rotation to perform overhead reaching     Improve R shoulder strength to 3-/5 MMT throughout in order to improve tolerance with ADLs and IADLs      PLAN     Continue with current Plan of Care and progress per pt's tolerance    Riccardo Berman, PTA

## 2022-07-13 ENCOUNTER — TELEPHONE (OUTPATIENT)
Dept: RHEUMATOLOGY | Facility: CLINIC | Age: 57
End: 2022-07-13
Payer: MEDICAID

## 2022-07-13 NOTE — TELEPHONE ENCOUNTER
Pt receives temazepam for sleep not Ambien  He has 5 refills  Pt notified  Verbalized understanding    ----- Message from Regina Sepulveda sent at 7/13/2022  2:00 PM CDT -----  Regarding: medication refill  Patient called requesting refill for Ambien.. [facility]... Hema Cho

## 2022-07-14 ENCOUNTER — DOCUMENTATION ONLY (OUTPATIENT)
Dept: REHABILITATION | Facility: HOSPITAL | Age: 57
End: 2022-07-14
Payer: MEDICAID

## 2022-07-14 ENCOUNTER — CLINICAL SUPPORT (OUTPATIENT)
Dept: REHABILITATION | Facility: HOSPITAL | Age: 57
End: 2022-07-14
Payer: MEDICAID

## 2022-07-14 DIAGNOSIS — G89.29 CHRONIC RIGHT SHOULDER PAIN: ICD-10-CM

## 2022-07-14 DIAGNOSIS — M25.611 DECREASED ROM OF RIGHT SHOULDER: Primary | ICD-10-CM

## 2022-07-14 DIAGNOSIS — R29.898 DECREASED STRENGTH OF UPPER EXTREMITY: ICD-10-CM

## 2022-07-14 DIAGNOSIS — M25.511 CHRONIC RIGHT SHOULDER PAIN: ICD-10-CM

## 2022-07-14 PROCEDURE — 97110 THERAPEUTIC EXERCISES: CPT

## 2022-07-14 NOTE — PROGRESS NOTES
Performed face to face conference with Riccardo Berman PTA, regarding pt's POC and progress thus far

## 2022-07-14 NOTE — PLAN OF CARE
[facility]  Physical Therapy Plan of Care Note    Name: Darius Restrepo  [facility] Number: [id number]    Therapy Diagnosis:   Encounter Diagnoses   Name Primary?    Decreased ROM of right shoulder Yes    Decreased strength of upper extremity     Chronic right shoulder pain      Physician: Ventura Quintero MD    Visit Date: 7/14/2022  Time In: 13:00  Time Out: 13:30  Total billable minutes: 30 minutes     Physician Orders: PT Eval and Treat  Medical Diagnosis from Referral: R rotator cuff syndrome  Evaluation Date: 6/15/2022  Authorization Period Expiration: 8/10/2022  Progress Note Due: 07/14/2022  Visit # / Visits authorized: 9 /16      Precautions: Standard  Functional Level Prior to Evaluation:   Modified Independent    SUBJECTIVE     Pt reports: continues to have pain in R shoulder with overhead movements, was going to call the MD to get another shot in it for pain  He was somewhat compliant with home exercise program.  Response to previous treatment: fair  Functional change: continued pain with daily activities    Pain: 7/10 with Tylenol  Location: right shoulder      OBJECTIVE     Update:   R shoulder ROM: flexion 145 degrees, abduction 126 degrees, ER 80 degrees, IR 75 degrees  R shoulder strength: 3-/5 MMT throughout  Crepitus present in anterior shoulder with flexion and abduction movements  Pain reported at distal end of shoulder   (+) Empty Can, Open Can    Treatment:     therapeutic exercises to develop strength, endurance and ROM for 30 minutes including:     Therapeutic Exercise Grid     Exercise 1  Exercise 2  Exercise 3  Exercise 4    Exercise :    Pulley: flexion, scaption Table ROM: flexion, scaption, ER Wall ROM: semi-circles, flexion Isometric: ER, IR, flexion, extension, abduction, adduction   Repetition/Time :    3 min   10   10   10     Resist or Assist :                  Comment :                 Done :    yes  yes   no   no                      Exercise 5  Exercise  6  Exercise 7  Exercise 8    Exercise :    NuStep UE only   Belly press   Protraction on elbows   B upper trap stretch     Repetition/Time :    5 min   10 x 3 sec hold   10   5 x 15 sec hold     Resist or Assist :    L4              Comment :                  Done :    no   no   no   no                     Exercise 9  Exercise 10  Exercise 11  Exercise 12    Exercise :    TB: ER, IR, extension, adduction   Empty Can, Full Can           Repetition/Time :    10   5           Resist or Assist :    Yellow TB              Comment :                  Done :    yes   yes                        ASSESSMENT     Update: Pt progressing slowly with improved overall functional mobility of R shoulder. Pt continues to c/o significant pain in R shoulder limiting daily activities. R shoulder ROM and strength has slightly improved compared to initial eval but continues to be limited. Pt requesting another injection in R shoulder to decrease his pain but discussed importance of finding out cause of the pain    Anticipated barriers to physical therapy: chronic R shoulder pain     Short Term Goals: 4 weeks      Pt will demonstrate knowledge and independence with HEP to continue with exercises outside of therapy- progressing (pt performs a few exercises from HEP outside of therapy)     Reduce c/o pain in R shoulder to < 4/10 pain level with daily activities- not met (c/o 7-8/10 pain level)     Improve R shoulder ROM to 140 degrees of flexion, 120 degrees of abduction, 80 degrees of internal rotation, 75 degrees of external rotation to perform overhead reaching- met (flexion 145, abduction 126, ER 80, IR 75 degrees)     Improve R shoulder strength to 3-/5 MMT throughout in order to improve tolerance with ADLs and IADLs- met (3-/5 MMT throughout)     Long Term Goals: 8 weeks      Pt will improve score on Quick Dash by 10 points which indicates improved ability to perform daily tasks with less difficulty and pain     Improve R shoulder ROM to  160 degrees of flexion, 140 degrees of abduction, 85 degrees of internal rotation and external rotation to perform overhead reaching- progressing (flexion 145, abduction 126, ER 80, IR 75 degrees)    Improve R shoulder strength to 4-/5 MMT throughout in order to improve tolerance with ADLs and IADLs- progressing (3-/5 MMT throughout)      PLAN     No plans to request additional PT at this time but recommend pt follow-up with MD for reassessment of R shoulder pathology causing his deficits, MRI or CT scan    Elva Dotson, PT

## 2022-07-19 ENCOUNTER — CLINICAL SUPPORT (OUTPATIENT)
Dept: REHABILITATION | Facility: HOSPITAL | Age: 57
End: 2022-07-19
Payer: MEDICAID

## 2022-07-19 DIAGNOSIS — M25.511 CHRONIC RIGHT SHOULDER PAIN: ICD-10-CM

## 2022-07-19 DIAGNOSIS — R29.898 DECREASED STRENGTH OF UPPER EXTREMITY: ICD-10-CM

## 2022-07-19 DIAGNOSIS — M25.611 DECREASED ROM OF RIGHT SHOULDER: Primary | ICD-10-CM

## 2022-07-19 DIAGNOSIS — G89.29 CHRONIC RIGHT SHOULDER PAIN: ICD-10-CM

## 2022-07-19 PROCEDURE — 97110 THERAPEUTIC EXERCISES: CPT | Mod: CQ

## 2022-07-19 NOTE — PROGRESS NOTES
[facility]   Physical Therapy Treatment Note     Name: Darius Restrepo  Clinic Number: [id number]    Therapy Diagnosis:   No diagnosis found.  Physician: Ventura Quintero MD    Authorization Period Expiration:8/10/2022  Plan of Care Expiration: 8/12/2022  Progress Note Due:   Visit # / Visits authorized: 10/16     PTA Visit: 1/5     Visit Date: 7/19/2022    Time In: 1329  Time Out: 1359  Total Billable Time: 30 minutes    SUBJECTIVE     Pt reports: Less pain today, but continues to have trouble getting to sleep. Was up until 3 am and just woke up before therapy.  He was not compliant with home exercise program.  Response to previous treatment: good  Functional change: none    Pain: 6/10   Location: right shoulder      OBJECTIVE     Objective Measures updated at progress report unless specified.     Treatment     Darius received the treatments listed below:      therapeutic exercises to develop strength, endurance and ROM for 30 minutes including:    Therapeutic Exercise Grid     Exercise 1  Exercise 2  Exercise 3  Exercise 4    Exercise :    Pendulum or pulley Table ROM: flexion, scaption, ER Wall ROM: semi-circles, flexion Isometric: ER, IR, flexion, extension, abduction, adduction   Repetition/Time :    3 min   20   15   15 x 3 sec     Resist or Assist :                  Comment :                 Done :    yes  yes   yes   yes                      Exercise 5  Exercise 6  Exercise 7  Exercise 8    Exercise :    NuStep UE only   Belly press   Protraction on elbows   B upper trap stretch     Repetition/Time :    5 min   20 x 3 sec hold   20   5 x 15 sec hold     Resist or Assist :    L6              Comment :                  Done :    yes   yes   yes   no                     Patient Education and Home Exercises     Home Exercises Provided and Patient Education Provided     Education provided: Educated pt on importance of HEP and encouraged pt to continue daily    ASSESSMENT     Pt had slight  increase in pain after exercises today. Continued with empty and full can exercises, as well as punches from last session, and theraband in place of isometric exercises with good tolerance.    Darius Is progressing fair towards his goals.   Pt prognosis is Fair.     Pt will continue to benefit from skilled outpatient physical therapy to address the deficits listed in the problem list box on initial evaluation, provide pt/family education and to maximize pt's level of independence in the home and community environment.     Pt's spiritual, cultural and educational needs considered and pt agreeable to plan of care and goals.     Anticipated barriers to physical therapy: chronic R shoulder pain     Short Term Goals: 4 weeks      Pt will demonstrate knowledge and independence with HEP to continue with exercises outside of therapy- progressing (pt performs a few exercises from HEP outside of therapy)     Reduce c/o pain in R shoulder to < 4/10 pain level with daily activities- not met (c/o 7-8/10 pain level)     Improve R shoulder ROM to 140 degrees of flexion, 120 degrees of abduction, 80 degrees of internal rotation, 75 degrees of external rotation to perform overhead reaching- met (flexion 145, abduction 126, ER 80, IR 75 degrees)     Improve R shoulder strength to 3-/5 MMT throughout in order to improve tolerance with ADLs and IADLs- met (3-/5 MMT throughout)     Long Term Goals: 8 weeks      Pt will improve score on Quick Dash by 10 points which indicates improved ability to perform daily tasks with less difficulty and pain     Improve R shoulder ROM to 160 degrees of flexion, 140 degrees of abduction, 85 degrees of internal rotation and external rotation to perform overhead reaching- progressing (flexion 145, abduction 126, ER 80, IR 75 degrees)     Improve R shoulder strength to 4-/5 MMT throughout in order to improve tolerance with ADLs and IADLs- progressing (3-/5 MMT throughout)    PLAN     Continue with current  Plan of Care and progress per pt's tolerance    Riccardo Berman, PTA

## 2022-07-21 ENCOUNTER — CLINICAL SUPPORT (OUTPATIENT)
Dept: REHABILITATION | Facility: HOSPITAL | Age: 57
End: 2022-07-21
Payer: MEDICAID

## 2022-07-21 DIAGNOSIS — M25.511 CHRONIC RIGHT SHOULDER PAIN: ICD-10-CM

## 2022-07-21 DIAGNOSIS — R29.898 DECREASED STRENGTH OF UPPER EXTREMITY: ICD-10-CM

## 2022-07-21 DIAGNOSIS — G89.29 CHRONIC RIGHT SHOULDER PAIN: ICD-10-CM

## 2022-07-21 DIAGNOSIS — M25.611 DECREASED ROM OF RIGHT SHOULDER: Primary | ICD-10-CM

## 2022-07-21 PROCEDURE — 97110 THERAPEUTIC EXERCISES: CPT

## 2022-07-26 ENCOUNTER — CLINICAL SUPPORT (OUTPATIENT)
Dept: REHABILITATION | Facility: HOSPITAL | Age: 57
End: 2022-07-26
Payer: MEDICAID

## 2022-07-26 PROCEDURE — 97110 THERAPEUTIC EXERCISES: CPT | Mod: CQ

## 2022-07-26 NOTE — PROGRESS NOTES
[facility]   Physical Therapy Treatment Note     Name: Darius Restrepo  Clinic Number: [id number]    Therapy Diagnosis:   No diagnosis found.  Physician: Ventura Quintero MD    Authorization Period Expiration:8/12/2022  Plan of Care Expiration: 8/12/2022  Progress Note Due: 7/15/2022  Visit # / Visits authorized: 12/16    PTA Visit: 1/5     Visit Date: 7/26/2022    Time In: 0906  Time Out: 0930  Total Billable Time: 24 minutes    SUBJECTIVE     Pt reports: Has increased shoulder pain today. Has appointment next month with doctor to continue trying to control his BP.   He was compliant with home exercise program.  Response to previous treatment: good  Functional change: none    Pain: 8/10 without Tylenol yet today  Location: right shoulder      OBJECTIVE     BP (sitting): 164/94 HR-74 after session.    Treatment     Darius received the treatments listed below:         therapeutic exercises to develop strength, endurance and ROM for 24 minutes including:     Therapeutic Exercise Grid     Exercise 1  Exercise 2  Exercise 3  Exercise 4    Exercise :    Pulley: flexion, scaption Table ROM: flexion, scaption, ER Wall ROM: semi-circles, flexion Isometric: ER, IR, flexion, extension, abduction, adduction   Repetition/Time :    3 min   10   10   15     Resist or Assist :                  Comment :                 Done :    yes  yes   no   no                      Exercise 5  Exercise 6  Exercise 7  Exercise 8    Exercise :    NuStep UE only   Belly press   Protraction on elbows   B upper trap stretch     Repetition/Time :    5 min   10 x 3 sec hold   20   5 x 15 sec hold     Resist or Assist :    L6              Comment :                  Done :    yes   no   yes   no                      Exercise 9  Exercise 10  Exercise 11  Exercise 12    Exercise :    TB: ER, IR, extension, adduction   Empty Can, Full Can   TB: lawnmower        Repetition/Time :    15   15   25        Resist or Assist :    Yellow TB       Double yellow TB        Comment :                  Done :    yes   yes   yes                        Patient Education and Home Exercises     Home Exercises Provided and Patient Education Provided     Education provided: Educated pt on importance of HEP and encouraged pt to continue daily    ASSESSMENT     Continued with adjustments made last session. Some verbal cues needed to avoid compensatory movements of R shoulder / torso. Very limited ROM with empty can.     Darius Is progressing fair towards his goals.   Pt prognosis is Fair.     Pt will continue to benefit from skilled outpatient physical therapy to address the deficits listed in the problem list box on initial evaluation, provide pt/family education and to maximize pt's level of independence in the home and community environment.     Pt's spiritual, cultural and educational needs considered and pt agreeable to plan of care and goals.     Anticipated barriers to physical therapy: chronic R shoulder pain     Short Term Goals: 4 weeks      Pt will demonstrate knowledge and independence with HEP to continue with exercises outside of therapy- progressing (pt performs a few exercises from HEP outside of therapy)     Reduce c/o pain in R shoulder to < 4/10 pain level with daily activities- not met (c/o 7-8/10 pain level)     Improve R shoulder ROM to 140 degrees of flexion, 120 degrees of abduction, 80 degrees of internal rotation, 75 degrees of external rotation to perform overhead reaching- met (flexion 145, abduction 126, ER 80, IR 75 degrees)     Improve R shoulder strength to 3-/5 MMT throughout in order to improve tolerance with ADLs and IADLs- met (3-/5 MMT throughout)     Long Term Goals: 8 weeks      Pt will improve score on Quick Dash by 10 points which indicates improved ability to perform daily tasks with less difficulty and pain     Improve R shoulder ROM to 160 degrees of flexion, 140 degrees of abduction, 85 degrees of internal rotation and  external rotation to perform overhead reaching- progressing (flexion 145, abduction 126, ER 80, IR 75 degrees)     Improve R shoulder strength to 4-/5 MMT throughout in order to improve tolerance with ADLs and IADLs- progressing (3-/5 MMT throughout)     PLAN     Continue with current Plan of Care and progress per pt's tolerance    Riccardo Berman, PTA

## 2022-07-28 ENCOUNTER — CLINICAL SUPPORT (OUTPATIENT)
Dept: REHABILITATION | Facility: HOSPITAL | Age: 57
End: 2022-07-28
Payer: MEDICAID

## 2022-07-28 DIAGNOSIS — M25.511 CHRONIC RIGHT SHOULDER PAIN: ICD-10-CM

## 2022-07-28 DIAGNOSIS — R29.898 DECREASED STRENGTH OF UPPER EXTREMITY: ICD-10-CM

## 2022-07-28 DIAGNOSIS — M25.611 DECREASED ROM OF RIGHT SHOULDER: Primary | ICD-10-CM

## 2022-07-28 DIAGNOSIS — G89.29 CHRONIC RIGHT SHOULDER PAIN: ICD-10-CM

## 2022-07-28 PROCEDURE — 97110 THERAPEUTIC EXERCISES: CPT | Mod: CQ

## 2022-07-28 NOTE — PROGRESS NOTES
[facility]   Physical Therapy Treatment Note     Name: Darius Restrepo  Clinic Number: [id number]    Therapy Diagnosis:   Encounter Diagnoses   Name Primary?    Decreased ROM of right shoulder Yes    Decreased strength of upper extremity     Chronic right shoulder pain      Physician: Ventura Quintero MD    Authorization Period Expiration:8/12/2022  Plan of Care Expiration: 8/12/2022  Progress Note Completed: 7/15/2022  Visit # / Visits authorized: 13/16    PTA Visit: 2/5     Visit Date: 7/28/2022    Time In: 0859  Time Out: 0923  Total Billable Time: 24 minutes    SUBJECTIVE     Pt reports: Has increased shoulder pain today. States it began last night and increased this morning.   He was compliant with home exercise program.  Response to previous treatment: good  Functional change: none    Pain: 9/10 without Tylenol yet today  Location: right shoulder      OBJECTIVE     N/A    Treatment     Darius received the treatments listed below:         therapeutic exercises to develop strength, endurance and ROM for 24 minutes including:     Therapeutic Exercise Grid     Exercise 1  Exercise 2  Exercise 3  Exercise 4    Exercise :    Pulley: flexion, scaption Table ROM: flexion, scaption, ER Wall ROM: semi-circles, flexion Isometric: ER, IR, flexion, extension, abduction, adduction   Repetition/Time :    3 min   10   10   15     Resist or Assist :                  Comment :                 Done :    yes  yes   no   no                      Exercise 5  Exercise 6  Exercise 7  Exercise 8    Exercise :    NuStep UE only   Belly press   Protraction on elbows   B upper trap stretch     Repetition/Time :    6 min   10 x 3 sec hold   20   5 x 15 sec hold     Resist or Assist :    L6              Comment :                  Done :    yes   no   yes   no                      Exercise 9  Exercise 10  Exercise 11  Exercise 12    Exercise :    TB: ER, IR, extension, adduction   Empty Can, Full Can   TB:  lawnmower        Repetition/Time :    15   15   25        Resist or Assist :    Yellow TB      Double yellow TB        Comment :                  Done :    yes   yes   yes                        Patient Education and Home Exercises     Home Exercises Provided and Patient Education Provided     Education provided: Educated pt on importance of HEP and encouraged pt to continue daily    ASSESSMENT     Pt had pain in R shoulder throughout session today. He had difficulty sleeping last night 2/2 pain. He was still able to complete all his repetitions. Increased NuStep time to 6 min.     Darius Is progressing fair towards his goals.   Pt prognosis is Fair.     Pt will continue to benefit from skilled outpatient physical therapy to address the deficits listed in the problem list box on initial evaluation, provide pt/family education and to maximize pt's level of independence in the home and community environment.     Pt's spiritual, cultural and educational needs considered and pt agreeable to plan of care and goals.     Anticipated barriers to physical therapy: chronic R shoulder pain     Short Term Goals: 4 weeks      Pt will demonstrate knowledge and independence with HEP to continue with exercises outside of therapy- progressing (pt performs a few exercises from HEP outside of therapy)     Reduce c/o pain in R shoulder to < 4/10 pain level with daily activities- not met (c/o 7-8/10 pain level)     Improve R shoulder ROM to 140 degrees of flexion, 120 degrees of abduction, 80 degrees of internal rotation, 75 degrees of external rotation to perform overhead reaching- met (flexion 145, abduction 126, ER 80, IR 75 degrees)     Improve R shoulder strength to 3-/5 MMT throughout in order to improve tolerance with ADLs and IADLs- met (3-/5 MMT throughout)     Long Term Goals: 8 weeks      Pt will improve score on Quick Dash by 10 points which indicates improved ability to perform daily tasks with less difficulty and  pain     Improve R shoulder ROM to 160 degrees of flexion, 140 degrees of abduction, 85 degrees of internal rotation and external rotation to perform overhead reaching- progressing (flexion 145, abduction 126, ER 80, IR 75 degrees)     Improve R shoulder strength to 4-/5 MMT throughout in order to improve tolerance with ADLs and IADLs- progressing (3-/5 MMT throughout)     PLAN     Continue with current Plan of Care and progress per pt's tolerance    Riccardo Berman, PTA

## 2022-08-02 ENCOUNTER — CLINICAL SUPPORT (OUTPATIENT)
Dept: REHABILITATION | Facility: HOSPITAL | Age: 57
End: 2022-08-02
Payer: MEDICAID

## 2022-08-02 DIAGNOSIS — M25.611 DECREASED ROM OF RIGHT SHOULDER: Primary | ICD-10-CM

## 2022-08-02 DIAGNOSIS — M25.511 CHRONIC RIGHT SHOULDER PAIN: ICD-10-CM

## 2022-08-02 DIAGNOSIS — R29.898 DECREASED STRENGTH OF UPPER EXTREMITY: ICD-10-CM

## 2022-08-02 DIAGNOSIS — G89.29 CHRONIC RIGHT SHOULDER PAIN: ICD-10-CM

## 2022-08-02 PROCEDURE — 97110 THERAPEUTIC EXERCISES: CPT

## 2022-08-02 NOTE — PLAN OF CARE
[facility]  Physical Therapy Plan of Care Note    Name: Darius Restrepo  [facility] Number: [id number]    Therapy Diagnosis:   Encounter Diagnoses   Name Primary?    Decreased ROM of right shoulder Yes    Decreased strength of upper extremity     Chronic right shoulder pain      Physician: Ventura Quintero MD    Visit Date: 8/2/2022  Time In: 08:55 am  Time Out: 09:30 am  Total billable minutes: 35 minutes     Physician Orders: PT Eval and Treat  Medical Diagnosis from Referral: R rotator cuff syndrome  Evaluation Date: 6/15/2022  Authorization Period Expiration: 8/10/2022  Progress Note Due: 08/02/2022  Visit # / Visits authorized: 14 /16    Precautions: Standard  Functional Level Prior to Evaluation: Modified Independent    SUBJECTIVE     Pt reports: continues to have severe pain in R shoulder mostly with overhead movements, pain was even worse over the weekend. He has appointment with primary MD on 8/3/2022 and will call orthopedic MD to make appointment once he completes therapy.  He was somewhat compliant with home exercise program.  Response to previous treatment: fair  Functional change: continued pain with daily activities    Pain: 7/10 with Tylenol  Location: right shoulder      OBJECTIVE     Update:   R shoulder PROM: flexion 145 degrees, abduction 126 degrees, ER 80 degrees, IR 75 degrees  R shoulder strength: 3-/5 MMT throughout  Crepitus present in anterior, lateral shoulder with flexion and abduction movements  Pain reported at distal end of shoulder   (+) Empty Can, Open Can    Treatment:     therapeutic exercises to develop strength, endurance and ROM for 30 minutes including:     Therapeutic Exercise Grid     Exercise 1  Exercise 2  Exercise 3  Exercise 4    Exercise :    Pulley: flexion, scaption Table ROM: flexion, scaption, ER Wall ROM: semi-circles, flexion Isometric: ER, IR, flexion, extension, abduction, adduction   Repetition/Time :    3 min   10   6   10     Resist  or Assist :                  Comment :                 Done :    yes  yes   yes   no                      Exercise 5  Exercise 6  Exercise 7  Exercise 8    Exercise :    NuStep UE only   Belly press   Protraction on elbows   B upper trap stretch     Repetition/Time :    6 min   10 x 3 sec hold   20   5 x 15 sec hold     Resist or Assist :    L7              Comment :                  Done :    yes   no   yes   no                     Exercise 9  Exercise 10  Exercise 11  Exercise 12    Exercise :    TB: ER, IR, extension, adduction   Empty Can, Full Can           Repetition/Time :    10   5           Resist or Assist :    Yellow TB              Comment :       Limited due to pain          Done :    yes   yes                        ASSESSMENT     Update: Pt has made minimal progress with reduction of R shoulder pain, R shoulder ROM with flexion and abduction movements, R shoulder strength since initial evaluation, and continues to be limited with daily actvities. Crepitus present in anterior / lateral R shoulder with overhead movements limiting reps with exercises. Pt's BP continues to be elevated even with patches and medication taken. Discussed importance of stopping smoking if orthopedic MD feels surgery is needed. Recommend reassessment and MRI or CT scan to determine R shoulder pathology    Anticipated barriers to physical therapy: chronic R shoulder pain     Short Term Goals: 4 weeks      Pt will demonstrate knowledge and independence with HEP to continue with exercises outside of therapy- progressing (pt performs a few exercises from HEP outside of therapy)     Reduce c/o pain in R shoulder to < 4/10 pain level with daily activities- not met (c/o 7-9/10 pain level with no relief with Tylenol)     Improve R shoulder PROM to 140 degrees of flexion, 120 degrees of abduction, 80 degrees of internal rotation, 75 degrees of external rotation to perform overhead reaching- met (flexion 145, abduction 126, ER 80, IR 75  degrees)     Improve R shoulder strength to 3-/5 MMT throughout in order to improve tolerance with ADLs and IADLs- met (3-/5 MMT throughout)     Long Term Goals: 8 weeks      Pt will improve score on Quick Dash by 10 points which indicates improved ability to perform daily tasks with less difficulty and pain     Improve R shoulder PROM to 160 degrees of flexion, 140 degrees of abduction, 85 degrees of internal rotation and external rotation to perform overhead reaching- progressing (flexion 145, abduction 126, ER 80, IR 75 degrees)    Improve R shoulder strength to 4-/5 MMT throughout in order to improve tolerance with ADLs and IADLs- progressing (3-/5 MMT throughout)      PLAN     Pt to be discharged from PT at end of authorization period     Elva Dotson, PT

## 2022-08-04 ENCOUNTER — CLINICAL SUPPORT (OUTPATIENT)
Dept: REHABILITATION | Facility: HOSPITAL | Age: 57
End: 2022-08-04
Payer: MEDICAID

## 2022-08-04 DIAGNOSIS — M25.611 DECREASED ROM OF RIGHT SHOULDER: Primary | ICD-10-CM

## 2022-08-04 DIAGNOSIS — M25.511 CHRONIC RIGHT SHOULDER PAIN: ICD-10-CM

## 2022-08-04 DIAGNOSIS — G89.29 CHRONIC RIGHT SHOULDER PAIN: ICD-10-CM

## 2022-08-04 DIAGNOSIS — R29.898 DECREASED STRENGTH OF UPPER EXTREMITY: ICD-10-CM

## 2022-08-04 PROCEDURE — 97110 THERAPEUTIC EXERCISES: CPT | Mod: CQ

## 2022-08-04 NOTE — PROGRESS NOTES
[facility]   Physical Therapy Treatment Note     Name: Darius Restrepo  Clinic Number: [id number]    Therapy Diagnosis:   Encounter Diagnoses   Name Primary?    Decreased ROM of right shoulder Yes    Decreased strength of upper extremity     Chronic right shoulder pain      Physician: Ventura Quintero MD    Authorization Period Expiration:8/12/2022  Plan of Care Expiration: 8/12/2022  Progress Note Completed: 7/15/2022  Visit # / Visits authorized: 15/16    PTA Visit: 1/5     Visit Date: 8/4/2022    Time In: 0859  Time Out: 0927  Total Billable Time: 28 minutes    SUBJECTIVE     Pt reports: Has decreased shoulder pain today, but not much. Continues to have difficulty sleeping.   He was compliant with home exercise program.  Response to previous treatment: good  Functional change: none    Pain: 7/10 without Tylenol yet today  Location: right shoulder      OBJECTIVE     N/A    Treatment     Darius received the treatments listed below:         therapeutic exercises to develop strength, endurance and ROM for 28 minutes including:     Therapeutic Exercise Grid     Exercise 1  Exercise 2  Exercise 3  Exercise 4    Exercise :    Pulley: flexion, scaption Table ROM: flexion, scaption, ER Wall ROM: semi-circles, flexion Isometric: ER, IR, flexion, extension, abduction, adduction   Repetition/Time :    3 min   15   17   15     Resist or Assist :                  Comment :                 Done :    yes  yes   no   no                      Exercise 5  Exercise 6  Exercise 7  Exercise 8    Exercise :    NuStep UE only   Belly press   Protraction on elbows   B upper trap stretch     Repetition/Time :    6 min   10 x 3 sec hold   20   5 x 15 sec hold     Resist or Assist :    L7              Comment :                  Done :    yes   no   yes   no                      Exercise 9  Exercise 10  Exercise 11  Exercise 12    Exercise :    TB: ER, IR, extension, adduction    Full Can   TB: lawnmower         Repetition/Time :    15   10   25        Resist or Assist :    Yellow TB      Double yellow TB        Comment :                  Done :    yes   yes   yes                        Patient Education and Home Exercises     Home Exercises Provided and Patient Education Provided     Education provided: Educated pt on importance of HEP and encouraged pt to continue daily    ASSESSMENT     Increased repetitions of table top ROM exercises to 15, full can to 10, and he was able to perform 17 reps of wall semi-circles with good tolerance. Continues to need occasional verbal cues to avoid shoulder hiking when fatigue and pain set in. Gave pt name and number of Dr. Quintero so he can contact about another appointment.     Darius Is progressing fair towards his goals.   Pt prognosis is Fair.     Pt will continue to benefit from skilled outpatient physical therapy to address the deficits listed in the problem list box on initial evaluation, provide pt/family education and to maximize pt's level of independence in the home and community environment.     Pt's spiritual, cultural and educational needs considered and pt agreeable to plan of care and goals.     Anticipated barriers to physical therapy: chronic R shoulder pain     Short Term Goals: 4 weeks      Pt will demonstrate knowledge and independence with HEP to continue with exercises outside of therapy- progressing (pt performs a few exercises from HEP outside of therapy)     Reduce c/o pain in R shoulder to < 4/10 pain level with daily activities- not met (c/o 7-9/10 pain level with no relief with Tylenol)     Improve R shoulder PROM to 140 degrees of flexion, 120 degrees of abduction, 80 degrees of internal rotation, 75 degrees of external rotation to perform overhead reaching- met (flexion 145, abduction 126, ER 80, IR 75 degrees)     Improve R shoulder strength to 3-/5 MMT throughout in order to improve tolerance with ADLs and IADLs- met (3-/5 MMT throughout)     Long Term  Goals: 8 weeks      Pt will improve score on Quick Dash by 10 points which indicates improved ability to perform daily tasks with less difficulty and pain     Improve R shoulder PROM to 160 degrees of flexion, 140 degrees of abduction, 85 degrees of internal rotation and external rotation to perform overhead reaching- progressing (flexion 145, abduction 126, ER 80, IR 75 degrees)     Improve R shoulder strength to 4-/5 MMT throughout in order to improve tolerance with ADLs and IADLs- progressing (3-/5 MMT throughout)     PLAN     Continue with current Plan of Care and progress per pt's tolerance    Riccardo Berman, PTA

## 2022-08-08 ENCOUNTER — TELEPHONE (OUTPATIENT)
Dept: RHEUMATOLOGY | Facility: CLINIC | Age: 57
End: 2022-08-08
Payer: MEDICAID

## 2022-08-08 NOTE — TELEPHONE ENCOUNTER
Spoke with patient and he is stating that his is insurance company flagged lexapro because his psych  Prescribes  him sertraline 100mg because its in the same class as the lexapro.. he is wanting to know which medication he has to stop.. Please Advise..       ----- Message from Regina Sepulveda sent at 8/8/2022  1:15 PM CDT -----  Regarding: Medication  Patient called stating that he has some questions about his medication. Please call [phone]

## 2022-08-09 ENCOUNTER — CLINICAL SUPPORT (OUTPATIENT)
Dept: REHABILITATION | Facility: HOSPITAL | Age: 57
End: 2022-08-09
Payer: MEDICAID

## 2022-08-09 DIAGNOSIS — G89.29 CHRONIC RIGHT SHOULDER PAIN: ICD-10-CM

## 2022-08-09 DIAGNOSIS — R29.898 DECREASED STRENGTH OF UPPER EXTREMITY: ICD-10-CM

## 2022-08-09 DIAGNOSIS — M25.511 CHRONIC RIGHT SHOULDER PAIN: ICD-10-CM

## 2022-08-09 DIAGNOSIS — M25.611 DECREASED ROM OF RIGHT SHOULDER: Primary | ICD-10-CM

## 2022-08-09 PROCEDURE — 97110 THERAPEUTIC EXERCISES: CPT

## 2022-08-09 NOTE — PLAN OF CARE
[facility]  Physical Therapy Discharge Note    Name: Darius Restrepo  Clinic Number: [id number]    Therapy Diagnosis:   Encounter Diagnoses   Name Primary?    Decreased ROM of right shoulder Yes    Decreased strength of upper extremity     Chronic right shoulder pain      Physician: Ventrua Quintero MD    Physician Orders: PT eval and treat  Medical Diagnosis: R rotator cuff syndrome  Evaluation Date: 6/15/2022    Date of Last visit: 08/09/2022  Total Visits Received: 16 visits    Time In: 09:00 am  Time Out: 09:30 am  Total Billable Time: 30 minutes    SUBJECTIVE     Pt reports: he as appointment with orthopedic MD on 8/17/2022  He was compliant with home exercise program.  Response to previous treatment: fair  Functional change: continued R shoulder pain with overhead movements    Quick Dash (UE disability): 61.4% improved from 84% on eval    Pain: 6/10 with 2 Motrin taken this morning  Location: right shoulder      OBJECTIVE     Update:   R shoulder PROM: flexion 145 degrees, abduction 126 degrees, ER 80 degrees, IR 75 degrees  R shoulder strength: 3-/5 MMT with flexion and abduction, 3+/5 MMT with ER, IR, and extension  Crepitus present in anterior, lateral shoulder with flexion and abduction movements  Pain reported at distal end of shoulder   (+) Empty Can, Open Can     Treatment     Darius received the treatments listed below:       therapeutic exercises to develop strength, endurance and ROM for 30 minutes including:     Therapeutic Exercise Grid     Exercise 1  Exercise 2  Exercise 3  Exercise 4    Exercise :    Pulley: flexion, scaption Table ROM: flexion, scaption, ER Wall ROM: semi-circles, flexion Isometric: ER, IR, flexion, extension, abduction, adduction   Repetition/Time :    3 min   10   6   10     Resist or Assist :                  Comment :                 Done :    yes  yes   yes   no                      Exercise 5  Exercise 6  Exercise 7  Exercise 8    Exercise :     NuStep UE only   Belly press   Protraction on elbows   B upper trap stretch     Repetition/Time :    6 min   10 x 3 sec hold   20   5 x 15 sec hold     Resist or Assist :    L7              Comment :                  Done :    yes   no   yes   no                      Exercise 9  Exercise 10  Exercise 11  Exercise 12    Exercise :    TB: ER, IR, extension, adduction   Empty Can, Full Can           Repetition/Time :    10   5           Resist or Assist :    Yellow TB              Comment :       Unable due increased pain          Done :    yes   no                         Patient Education and Home Exercises     Home Exercises Provided and Patient Education Provided     Education provided:   Pt has written HEP issued on initial eval, educated pt on importance and benefits of exercises and stretches, and encouraged pt to continue with HEP daily    ASSESSMENT     Pt has progressed slowly with R shoulder rehab. Pt has slightly less R shoulder pain, slightly improved PROM, and slightly improved strength since initial eval allowing pt to be slightly less limited with daily activities as evidence by improved Quick Dash score. Pt has significant crepitus in R shoulder with movements and continues to be limited with overhead movements due to pain. Pt has completed 16 PT visits with minimal improvements. Recommend pt be reassessed, have MRI, to determine R shoulder pathology    Darius Is progressing fair towards his goals.   Pt prognosis is Fair.     Pt's spiritual, cultural and educational needs considered and pt agreeable to plan of care and goals.     Anticipated barriers to physical therapy: chronic R shoulder pain    Discharge reason: Patient has reached the maximum rehab potential for the present time    Short Term Goals: 4 weeks      Pt will demonstrate knowledge and independence with HEP to continue with exercises outside of therapy- progressing (pt performs a few exercises from HEP outside of therapy)     Reduce c/o  pain in R shoulder to < 4/10 pain level with daily activities- not met (c/o 6-9/10 pain level with minimal relief with Motrin or Tylenol)     Improve R shoulder PROM to 140 degrees of flexion, 120 degrees of abduction, 80 degrees of internal rotation, 75 degrees of external rotation to perform overhead reaching- met (flexion 145, abduction 126, ER 80, IR 75 degrees)     Improve R shoulder strength to 3-/5 MMT throughout in order to improve tolerance with ADLs and IADLs- met (3-/5 MMT with flexion and abduction, 3+/5 MMT with ER, IR, and extension)     Long Term Goals: 8 weeks      Pt will improve score on Quick Dash by 10 points which indicates improved ability to perform daily tasks with less difficulty and pain     Improve R shoulder PROM to 160 degrees of flexion, 140 degrees of abduction, 85 degrees of internal rotation and external rotation to perform overhead reaching- progressing (flexion 145, abduction 126, ER 80, IR 75 degrees)     Improve R shoulder strength to 4-/5 MMT throughout in order to improve tolerance with ADLs and IADLs- progressing (3-/5 MMT with flexion and abduction, 3+/5 MMT with ER, IR, and extension)    PLAN   This patient is discharged from Physical Therapy      Elva Dotson, PT

## 2022-08-11 DIAGNOSIS — M75.100 TEAR OF ROTATOR CUFF, UNSPECIFIED LATERALITY, UNSPECIFIED TEAR EXTENT, UNSPECIFIED WHETHER TRAUMATIC: ICD-10-CM

## 2022-08-11 DIAGNOSIS — I10 PRIMARY HYPERTENSION: ICD-10-CM

## 2022-08-11 DIAGNOSIS — M35.9 UNDIFFERENTIATED CONNECTIVE TISSUE DISEASE: ICD-10-CM

## 2022-08-11 DIAGNOSIS — F32.A DEPRESSIVE DISORDER: ICD-10-CM

## 2022-08-11 DIAGNOSIS — M79.7 FIBROMYALGIA: ICD-10-CM

## 2022-08-11 DIAGNOSIS — F51.01 PRIMARY INSOMNIA: ICD-10-CM

## 2022-08-11 RX ORDER — HYDROCODONE BITARTRATE AND ACETAMINOPHEN 10; 325 MG/1; MG/1
TABLET ORAL
Qty: 90 TABLET | Refills: 0 | Status: SHIPPED | OUTPATIENT
Start: 2022-08-11 | End: 2022-08-11 | Stop reason: SDUPTHER

## 2022-08-11 RX ORDER — HYDROCODONE BITARTRATE AND ACETAMINOPHEN 10; 325 MG/1; MG/1
1 TABLET ORAL 3 TIMES DAILY PRN
Qty: 90 TABLET | Refills: 0 | Status: SHIPPED | OUTPATIENT
Start: 2022-08-11 | End: 2022-09-14

## 2022-08-16 RX ORDER — ZOLPIDEM TARTRATE 10 MG/1
TABLET ORAL
Qty: 30 TABLET | Refills: 5 | Status: SHIPPED | OUTPATIENT
Start: 2022-08-16 | End: 2022-09-16 | Stop reason: SDUPTHER

## 2022-08-17 ENCOUNTER — OFFICE VISIT (OUTPATIENT)
Dept: ORTHOPEDICS | Facility: CLINIC | Age: 57
End: 2022-08-17
Payer: MEDICAID

## 2022-08-17 VITALS — WEIGHT: 263 LBS | BODY MASS INDEX: 42.27 KG/M2 | HEIGHT: 66 IN

## 2022-08-17 DIAGNOSIS — G89.29 CHRONIC RIGHT SHOULDER PAIN: Primary | ICD-10-CM

## 2022-08-17 DIAGNOSIS — M25.511 CHRONIC RIGHT SHOULDER PAIN: Primary | ICD-10-CM

## 2022-08-17 PROCEDURE — 99214 PR OFFICE/OUTPT VISIT, EST, LEVL IV, 30-39 MIN: ICD-10-PCS | Mod: 25,S$PBB,, | Performed by: ORTHOPAEDIC SURGERY

## 2022-08-17 PROCEDURE — 99214 OFFICE O/P EST MOD 30 MIN: CPT | Mod: PBBFAC

## 2022-08-17 PROCEDURE — 99214 OFFICE O/P EST MOD 30 MIN: CPT | Mod: 25,S$PBB,, | Performed by: ORTHOPAEDIC SURGERY

## 2022-08-17 PROCEDURE — 20610 DRAIN/INJ JOINT/BURSA W/O US: CPT | Mod: S$PBB,RT,, | Performed by: ORTHOPAEDIC SURGERY

## 2022-08-17 PROCEDURE — 20610 DRAIN/INJ JOINT/BURSA W/O US: CPT | Mod: PBBFAC,RT | Performed by: ORTHOPAEDIC SURGERY

## 2022-08-17 PROCEDURE — 20610 PR DRAIN/INJECT LARGE JOINT/BURSA: ICD-10-PCS | Mod: S$PBB,RT,, | Performed by: ORTHOPAEDIC SURGERY

## 2022-08-17 PROCEDURE — 1159F PR MEDICATION LIST DOCUMENTED IN MEDICAL RECORD: ICD-10-PCS | Mod: CPTII,,, | Performed by: ORTHOPAEDIC SURGERY

## 2022-08-17 PROCEDURE — 3008F BODY MASS INDEX DOCD: CPT | Mod: CPTII,,, | Performed by: ORTHOPAEDIC SURGERY

## 2022-08-17 PROCEDURE — 4010F PR ACE/ARB THEARPY RXD/TAKEN: ICD-10-PCS | Mod: CPTII,,, | Performed by: ORTHOPAEDIC SURGERY

## 2022-08-17 PROCEDURE — 3008F PR BODY MASS INDEX (BMI) DOCUMENTED: ICD-10-PCS | Mod: CPTII,,, | Performed by: ORTHOPAEDIC SURGERY

## 2022-08-17 PROCEDURE — 4010F ACE/ARB THERAPY RXD/TAKEN: CPT | Mod: CPTII,,, | Performed by: ORTHOPAEDIC SURGERY

## 2022-08-17 PROCEDURE — 1159F MED LIST DOCD IN RCRD: CPT | Mod: CPTII,,, | Performed by: ORTHOPAEDIC SURGERY

## 2022-08-17 RX ORDER — LIDOCAINE HYDROCHLORIDE 10 MG/ML
5 INJECTION INFILTRATION; PERINEURAL
Status: COMPLETED | OUTPATIENT
Start: 2022-08-17 | End: 2022-08-17

## 2022-08-17 RX ORDER — TRIAMCINOLONE ACETONIDE 40 MG/ML
40 INJECTION, SUSPENSION INTRA-ARTICULAR; INTRAMUSCULAR
Status: COMPLETED | OUTPATIENT
Start: 2022-08-17 | End: 2022-08-17

## 2022-08-17 NOTE — PROGRESS NOTES
[facility]  Established Patient Office Visit  08/17/2022       Patient ID: Darius Restrepo  YOB: 1965  MRN: [id number]    Diagnosis:    There were no encounter diagnoses.     Procedure:     No surgery found on No surgery found      Chief Complaint: Follow-up of the Right Shoulder      Darius Restrepo is a 57 y.o. male who presents for follow up treatment of the above mentioned diagnosis.    57M, LHD with chronic R shoulder pain, worse at night and with overhead activities, previously treated with PT and R GH joint injection with temporary relief.  He says he did 12 weeks of therapy and made some improvement.  The injection helped for about a month.     PMH significant for stroke with residual R-sided weakness, recent syncopal episode requiring a cardiac monitor.  At last visit we stressed the recommendation for continued non-operative treatment of his shoulder pain.    Returns today interested in a second shoulder injection.    Past Medical History:    History reviewed. No pertinent past medical history.  Past Surgical History:   Procedure Laterality Date    Heart monitor      Heart Monitor Put in      No family history on file.  Social History     Socioeconomic History    Marital status:    Tobacco Use    Smoking status: Current Every Day Smoker     Packs/day: 0.50     Types: Cigarettes    Smokeless tobacco: Never Used    Tobacco comment: Nicotine patches used    Substance and Sexual Activity    Alcohol use: Never    Drug use: Never     Medication List with Changes/Refills   Current Medications    ALPRAZOLAM (XANAX) 0.5 MG TABLET    Take 1 tablet (0.5 mg total) by mouth 2 (two) times daily as needed for Anxiety.    AMLODIPINE (NORVASC) 10 MG TABLET    Take 10 mg by mouth once daily.    ATORVASTATIN (LIPITOR) 20 MG TABLET    Take 20 mg by mouth once daily.    CHOLECALCIFEROL, VITAMIN D3, 1,250 MCG (50,000 UNIT) CAPSULE    Take 1 capsule (50,000 Units total) by  mouth every 7 days.    CLONIDINE (CATAPRES) 0.2 MG TABLET    Take 0.2 mg by mouth 2 (two) times a day.    CLONIDINE 0.1 MG/24 HR TD PTWK (CATAPRES) 0.1 MG/24 HR    Place 1 patch onto the skin every 7 days.    CLONIDINE 0.3 MG/24 HR TD PTWK (CATAPRES) 0.3 MG/24 HR    Place 1 patch onto the skin every 7 days.    CLOPIDOGREL (PLAVIX) 75 MG TABLET    Take 75 mg by mouth once daily.    EPINEPHRINE (EPIPEN) 0.3 MG/0.3 ML ATIN    Inject 0.3 mLs (0.3 mg total) into the muscle as needed (allergic reaction).    ESCITALOPRAM OXALATE (LEXAPRO) 20 MG TABLET    Take 0.5 tablets (10 mg total) by mouth once daily. After breakfast    HYDRALAZINE (APRESOLINE) 10 MG TABLET    Take 20 mg by mouth 3 (three) times daily.    HYDROCHLOROTHIAZIDE (HYDRODIURIL) 25 MG TABLET    Take 25 mg by mouth once daily.    HYDROCODONE-ACETAMINOPHEN (NORCO)  MG PER TABLET    Take 1 tablet by mouth 3 (three) times daily as needed.    HYDROXYCHLOROQUINE (PLAQUENIL) 200 MG TABLET    Take 1 tablet (200 mg total) by mouth 2 (two) times daily.    LEFLUNOMIDE (ARAVA) 10 MG TAB    Take 2 tablets (20 mg total) by mouth once daily.    LEVETIRACETAM (KEPPRA) 500 MG TAB    Take 1 tablet (500 mg total) by mouth 2 (two) times daily.    LISINOPRIL-HYDROCHLOROTHIAZIDE (PRINZIDE,ZESTORETIC) 20-12.5 MG PER TABLET    Take 1 tablet by mouth 2 (two) times daily.    MELOXICAM (MOBIC) 15 MG TABLET    Take 1 tablet (15 mg total) by mouth once daily. After lunch    NICOTINE (NICODERM CQ) 21 MG/24 HR    Place 1 patch onto the skin once daily.    OLANZAPINE (ZYPREXA) 5 MG TABLET    Take 5 mg by mouth nightly.    OMEPRAZOLE (PRILOSEC) 40 MG CAPSULE    Take 1 capsule (40 mg total) by mouth once daily. Before lunch    PREGABALIN (LYRICA) 75 MG CAPSULE    Take 1 capsule (75 mg total) by mouth 3 (three) times daily.    QUETIAPINE (SEROQUEL) 300 MG TAB    Take 300 mg by mouth nightly.    RISPERIDONE (RISPERDAL M-TABS) 1 MG TBDL    Take 1 mg by mouth 2 (two) times daily.     TEMAZEPAM (RESTORIL) 15 MG CAP    Take 1 capsule (15 mg total) by mouth every evening.    TRAMADOL (ULTRAM) 50 MG TABLET    Take 50 mg by mouth every 6 (six) hours as needed.    ZOLPIDEM (AMBIEN) 10 MG TAB    TAKE ONE TABLET BY MOUTH EVERY DAY AT BEDTIME     Review of patient's allergies indicates:  No Known Allergies    ROS:    Body mass index is 42.45 kg/m².  GENERAL: Well appearing, appropriate for stated age, no acute distress.  CARDIOVASCULAR: Pulses regular by peripheral palpation.  PULMONARY: Respirations are even and non-labored.  NEURO: Awake, alert, and oriented x 3.  PSYCH: Mood & affect are appropriate.  HEENT: Head is normocephalic and atraumatic.    Physical Exam:    RUE:  No abrasions or open wounds  TTP AC joint, bicipital groove  ROM: Abd 90 active, 110 passive  FF 90 active, 100 passive  ER 45, IR L4  5/5 deltoid, biceps, triceps, ER, IR  + Crossbody, coburn  + Speeds,  + Jobes, + Bear hugger  5/5 AIN/PIN/U  SILT Ax/M/R/U  WWP    Imaging:    None new.  Prior XR R shoulder:  Minimal evidence of glenohumeral arthritis, minimal AC joint arthritis, no evidence of fracture.  Well reduced glenohumeral joint with maintained AH distance.    Assessment and Plan:    Darius Restrepo is a 57 y.o. male seen in the office today for There were no encounter diagnoses.     57M, LHD with chronic R shoulder pain, worse at night and with overhead activities, previously treated with PT and R GH joint injection with temporary relief.  - Repeat R shoulder injection today  - Demonstrated stretches and exercises he can continue at home now that he has completed therapy  - Follow up PRN    [facility] Procedure Note     Date: 08/17/2022  Time: 12:10 PM CDT     Procedure: right GH joint injection     Indications: Therapeutic Indication - Decrease pain, Increase range of motion and improve quality of life:   Risks:  Possible complications with the injection include bleeding, infection (.01%), tendon rupture, steroid flare, fat pad or  soft tissue atrophy, skin depigmentation, allergic reaction to medications and vasovagal response. (steroid flare treatment is rest, ice, NSAIDs and resolves in 24-36 hours.)  Consent:  Procedure, risks, benefits, and alternatives explained the patient, who voiced understanding and agreed to proceed with procedure.  Consent signed and scanned into the medical record.   No absolute contraindications (cellulitis overlying joint, infection, lack of informed consent, allergy to injection medication, AVN protein or egg allergy for sodium hyaluronate, or history of steroid flare) or relative contraindications (uncontrolled DM2 A1c>10, coagulopathy, INR > 3.5, previous joint replacement or history of AVN).        Staff: Dr Ventura Quintero  Description:  Time-out performed.  The patient was prepped in normal sterile fashion use of chlorhexidine scrub and the appropriate and anatomic landmarks were identified. Topical ethyl chloride was applied. Contents of syringe included: 5cc of 1% of lidocaine with 40mg of Kenalog   Complications: None   EBL: None   Post Procedure: Patient alert, and moving all extremities. ROM improved, pain decreased.  Good peripheral pulses, no signs of vascular compromise and range of motion intact.  Aftercare instructions were given to patient at time of discharge.  Relative rest for 3 days-avoiding excess activity.  Place ice on the area for 15 minutes every 4-6 hours. Patient may take Tylenol a 1000 mg b.i.d. or ibuprofen 600 mg t.i.d. for the next 3-4 days if not on medication already and safe to take pending co-morbidities.  Protect the area for the next 1-8 hours if anesthetic was used.  Avoid excessive activity for the next 3-4 weeks.  ER precautions given for fever, severe joint pain or allergic reaction or other new symptoms related to the joint injection.       Orders Placed This Encounter    triamcinolone acetonide injection 40 mg    LIDOcaine HCL 10 mg/ml (1%) injection 5 mL      Katy  MD Merly, PGY-5  [facility]

## 2022-08-18 DIAGNOSIS — R05.3 PERSISTENT COUGH: Primary | ICD-10-CM

## 2022-08-22 NOTE — PROGRESS NOTES
ATTENDING ADDENDUM    Darius Restrepo  was evaluated at the time of the encounter with Dr Moreland PGY5.  HPI, PE and treatment plan was reviewed. Treatment plan was reasonable and necessary. Imaging was reviewed at the time of visit.

## 2022-09-13 DIAGNOSIS — F32.A DEPRESSIVE DISORDER: ICD-10-CM

## 2022-09-13 DIAGNOSIS — I10 PRIMARY HYPERTENSION: ICD-10-CM

## 2022-09-13 DIAGNOSIS — M35.9 UNDIFFERENTIATED CONNECTIVE TISSUE DISEASE: ICD-10-CM

## 2022-09-13 DIAGNOSIS — F51.01 PRIMARY INSOMNIA: ICD-10-CM

## 2022-09-13 DIAGNOSIS — M75.100 TEAR OF ROTATOR CUFF, UNSPECIFIED LATERALITY, UNSPECIFIED TEAR EXTENT, UNSPECIFIED WHETHER TRAUMATIC: ICD-10-CM

## 2022-09-13 DIAGNOSIS — M79.7 FIBROMYALGIA: ICD-10-CM

## 2022-09-14 RX ORDER — HYDROCODONE BITARTRATE AND ACETAMINOPHEN 10; 325 MG/1; MG/1
1 TABLET ORAL 3 TIMES DAILY PRN
Qty: 90 TABLET | Refills: 0 | Status: SHIPPED | OUTPATIENT
Start: 2022-09-14 | End: 2022-10-11

## 2022-09-16 ENCOUNTER — OFFICE VISIT (OUTPATIENT)
Dept: RHEUMATOLOGY | Facility: CLINIC | Age: 57
End: 2022-09-16
Payer: MEDICAID

## 2022-09-16 VITALS
OXYGEN SATURATION: 95 % | BODY MASS INDEX: 40.18 KG/M2 | DIASTOLIC BLOOD PRESSURE: 88 MMHG | TEMPERATURE: 98 F | HEART RATE: 91 BPM | SYSTOLIC BLOOD PRESSURE: 152 MMHG | RESPIRATION RATE: 20 BRPM | WEIGHT: 250 LBS | HEIGHT: 66 IN

## 2022-09-16 DIAGNOSIS — I10 PRIMARY HYPERTENSION: ICD-10-CM

## 2022-09-16 DIAGNOSIS — F51.01 PRIMARY INSOMNIA: ICD-10-CM

## 2022-09-16 DIAGNOSIS — M75.100 TEAR OF ROTATOR CUFF, UNSPECIFIED LATERALITY, UNSPECIFIED TEAR EXTENT, UNSPECIFIED WHETHER TRAUMATIC: ICD-10-CM

## 2022-09-16 DIAGNOSIS — M79.7 FIBROMYALGIA: ICD-10-CM

## 2022-09-16 DIAGNOSIS — M35.9 UNDIFFERENTIATED CONNECTIVE TISSUE DISEASE: Primary | ICD-10-CM

## 2022-09-16 DIAGNOSIS — F32.A DEPRESSIVE DISORDER: ICD-10-CM

## 2022-09-16 PROCEDURE — 1159F PR MEDICATION LIST DOCUMENTED IN MEDICAL RECORD: ICD-10-PCS | Mod: CPTII,,, | Performed by: INTERNAL MEDICINE

## 2022-09-16 PROCEDURE — 99214 OFFICE O/P EST MOD 30 MIN: CPT | Mod: S$PBB,,, | Performed by: INTERNAL MEDICINE

## 2022-09-16 PROCEDURE — 4010F PR ACE/ARB THEARPY RXD/TAKEN: ICD-10-PCS | Mod: CPTII,,, | Performed by: INTERNAL MEDICINE

## 2022-09-16 PROCEDURE — 3079F PR MOST RECENT DIASTOLIC BLOOD PRESSURE 80-89 MM HG: ICD-10-PCS | Mod: CPTII,,, | Performed by: INTERNAL MEDICINE

## 2022-09-16 PROCEDURE — 99999 PR PBB SHADOW E&M-EST. PATIENT-LVL V: ICD-10-PCS | Mod: PBBFAC,,, | Performed by: INTERNAL MEDICINE

## 2022-09-16 PROCEDURE — 3079F DIAST BP 80-89 MM HG: CPT | Mod: CPTII,,, | Performed by: INTERNAL MEDICINE

## 2022-09-16 PROCEDURE — 99214 PR OFFICE/OUTPT VISIT, EST, LEVL IV, 30-39 MIN: ICD-10-PCS | Mod: S$PBB,,, | Performed by: INTERNAL MEDICINE

## 2022-09-16 PROCEDURE — 4010F ACE/ARB THERAPY RXD/TAKEN: CPT | Mod: CPTII,,, | Performed by: INTERNAL MEDICINE

## 2022-09-16 PROCEDURE — 99999 PR PBB SHADOW E&M-EST. PATIENT-LVL V: CPT | Mod: PBBFAC,,, | Performed by: INTERNAL MEDICINE

## 2022-09-16 PROCEDURE — 3077F SYST BP >= 140 MM HG: CPT | Mod: CPTII,,, | Performed by: INTERNAL MEDICINE

## 2022-09-16 PROCEDURE — 3077F PR MOST RECENT SYSTOLIC BLOOD PRESSURE >= 140 MM HG: ICD-10-PCS | Mod: CPTII,,, | Performed by: INTERNAL MEDICINE

## 2022-09-16 PROCEDURE — 3008F PR BODY MASS INDEX (BMI) DOCUMENTED: ICD-10-PCS | Mod: CPTII,,, | Performed by: INTERNAL MEDICINE

## 2022-09-16 PROCEDURE — 3008F BODY MASS INDEX DOCD: CPT | Mod: CPTII,,, | Performed by: INTERNAL MEDICINE

## 2022-09-16 PROCEDURE — 99215 OFFICE O/P EST HI 40 MIN: CPT | Mod: PBBFAC | Performed by: INTERNAL MEDICINE

## 2022-09-16 PROCEDURE — 1159F MED LIST DOCD IN RCRD: CPT | Mod: CPTII,,, | Performed by: INTERNAL MEDICINE

## 2022-09-16 RX ORDER — ALPRAZOLAM 0.5 MG/1
0.5 TABLET ORAL 2 TIMES DAILY PRN
Qty: 60 TABLET | Refills: 5 | Status: SHIPPED | OUTPATIENT
Start: 2022-09-16 | End: 2023-04-14

## 2022-09-16 RX ORDER — FOLIC ACID 1 MG/1
1 TABLET ORAL DAILY
Qty: 30 TABLET | Refills: 5 | Status: SHIPPED | OUTPATIENT
Start: 2022-09-16 | End: 2023-01-30 | Stop reason: SDUPTHER

## 2022-09-16 RX ORDER — QUETIAPINE FUMARATE 50 MG/1
50 TABLET, FILM COATED ORAL NIGHTLY
Status: ON HOLD | COMMUNITY
Start: 2022-09-14 | End: 2022-12-02

## 2022-09-16 RX ORDER — DICLOFENAC SODIUM 50 MG/1
50 TABLET, DELAYED RELEASE ORAL 2 TIMES DAILY PRN
Qty: 60 TABLET | Refills: 5 | Status: SHIPPED | OUTPATIENT
Start: 2022-09-16 | End: 2023-01-30

## 2022-09-16 RX ORDER — HYDROXYCHLOROQUINE SULFATE 200 MG/1
200 TABLET, FILM COATED ORAL 2 TIMES DAILY
Qty: 60 TABLET | Refills: 5 | Status: SHIPPED | OUTPATIENT
Start: 2022-09-16 | End: 2023-01-30 | Stop reason: SDUPTHER

## 2022-09-16 RX ORDER — PREGABALIN 100 MG/1
100 CAPSULE ORAL 3 TIMES DAILY
Qty: 90 CAPSULE | Refills: 5 | Status: SHIPPED | OUTPATIENT
Start: 2022-09-16 | End: 2023-01-30 | Stop reason: SDUPTHER

## 2022-09-16 RX ORDER — OMEPRAZOLE 40 MG/1
40 CAPSULE, DELAYED RELEASE ORAL DAILY
Qty: 30 CAPSULE | Refills: 5 | Status: SHIPPED | OUTPATIENT
Start: 2022-09-16 | End: 2023-02-21 | Stop reason: ALTCHOICE

## 2022-09-16 RX ORDER — CARVEDILOL 12.5 MG/1
1 TABLET ORAL DAILY
Status: ON HOLD | COMMUNITY
Start: 2022-07-15 | End: 2022-12-02

## 2022-09-16 RX ORDER — ESCITALOPRAM OXALATE 20 MG/1
10 TABLET ORAL DAILY
Qty: 30 TABLET | Refills: 5 | Status: SHIPPED | OUTPATIENT
Start: 2022-09-16 | End: 2023-05-25 | Stop reason: SDUPTHER

## 2022-09-16 RX ORDER — LEVETIRACETAM 1000 MG/1
1 TABLET ORAL 3 TIMES DAILY
Status: ON HOLD | COMMUNITY
Start: 2022-09-08 | End: 2022-12-02

## 2022-09-16 RX ORDER — LEFLUNOMIDE 20 MG/1
20 TABLET ORAL DAILY
COMMUNITY
Start: 2022-09-14 | End: 2022-09-16

## 2022-09-16 RX ORDER — ASPIRIN 325 MG
50000 TABLET, DELAYED RELEASE (ENTERIC COATED) ORAL
Qty: 5 CAPSULE | Refills: 5 | Status: SHIPPED | OUTPATIENT
Start: 2022-09-16 | End: 2023-01-30 | Stop reason: SDUPTHER

## 2022-09-16 RX ORDER — METHOTREXATE 2.5 MG/1
10 TABLET ORAL
Qty: 20 TABLET | Refills: 5 | Status: SHIPPED | OUTPATIENT
Start: 2022-09-16 | End: 2023-02-21 | Stop reason: ALTCHOICE

## 2022-09-16 RX ORDER — ZOLPIDEM TARTRATE 10 MG/1
10 TABLET ORAL NIGHTLY
Qty: 30 TABLET | Refills: 5 | Status: SHIPPED | OUTPATIENT
Start: 2022-09-16 | End: 2023-01-30 | Stop reason: SDUPTHER

## 2022-09-16 RX ORDER — TRAMADOL HYDROCHLORIDE 50 MG/1
50 TABLET ORAL EVERY 6 HOURS PRN
Qty: 90 TABLET | Refills: 5 | Status: SHIPPED | OUTPATIENT
Start: 2022-09-16 | End: 2023-02-21 | Stop reason: ALTCHOICE

## 2022-09-16 NOTE — PROGRESS NOTES
"Subjective:       Patient ID: Darius Restrepo is a 57 y.o. male.    Chief Complaint: Pain (FOLLOW UP/) and Follow-up (RIGHT SHOULDER PAIN/)    The patient is complaining of joint pain involving the MCP PIP wrist elbow shoulders hips knees and ankles bilaterally.  The pain is 9/10 in intensity dull in quality and continuous.  That is associated with a morning stiffness lasting for more than 60 minutes.  Is also having difficulty maintaining a good night of sleep.  This has been associated with myalgias.  Muscle aches are 8/10 in intensity dull in quality and continuous.  They are associated with fatigue.  No fever no chills no others.      Review of Systems   Constitutional:  Negative for appetite change, chills and fever.   HENT:  Negative for congestion, ear pain, mouth sores, nosebleeds and trouble swallowing.    Eyes:  Negative for photophobia and discharge.   Respiratory:  Negative for chest tightness and shortness of breath.    Cardiovascular:  Negative for chest pain.   Gastrointestinal:  Negative for abdominal pain and vomiting.   Endocrine: Negative.    Genitourinary:  Negative for hematuria.   Musculoskeletal:         As per HPI   Skin:  Negative for rash.   Neurological:  Negative for weakness.       Objective:   BP (!) 152/88 (BP Location: Right arm, Patient Position: Sitting, BP Method: Large (Automatic))   Pulse 91   Temp 97.9 °F (36.6 °C) (Oral)   Resp 20   Ht 5' 6" (1.676 m)   Wt 113.4 kg (250 lb)   SpO2 95%   BMI 40.35 kg/m²      Physical Exam   Constitutional: He is oriented to person, place, and time. He appears well-developed and well-nourished. No distress.   HENT:   Head: Normocephalic and atraumatic.   Right Ear: External ear normal.   Left Ear: External ear normal.   Eyes: Pupils are equal, round, and reactive to light.   Cardiovascular: Normal rate, regular rhythm and normal heart sounds.   Pulmonary/Chest: Breath sounds normal.   Abdominal: Soft. There is no abdominal tenderness. "   Musculoskeletal:      Right shoulder: Tenderness present.      Left shoulder: Tenderness present.      Right elbow: Tenderness present.      Left elbow: Tenderness present.      Right wrist: Tenderness present.      Left wrist: Tenderness present.      Cervical back: Neck supple.      Right hip: Tenderness present.      Left hip: Tenderness present.      Right knee: Tenderness present.      Left knee: Tenderness present.      Right ankle: Tenderness present.      Left ankle: Tenderness present.   Lymphadenopathy:     He has no cervical adenopathy.   Neurological: He is alert and oriented to person, place, and time. He displays normal reflexes. No cranial nerve deficit or sensory deficit. He exhibits normal muscle tone. Coordination normal.   Skin: No rash noted. No erythema.   Vitals reviewed.      Right Side Rheumatological Exam     The patient is tender to palpation of the shoulder, elbow, wrist, knee, 1st PIP, 1st MCP, 2nd PIP, 2nd MCP, 3rd PIP, 3rd MCP, 4th PIP, 4th MCP, 5th PIP, hip, ankle, 1st MTP, 2nd MTP, 3rd MTP, 4th MTP, 5th MTP, 1st toe IP, 2nd toe IP, 3rd toe IP, 4th toe IP and 5th toe IP    Left Side Rheumatological Exam     The patient is tender to palpation of the shoulder, elbow, wrist, knee, 1st PIP, 1st MCP, 2nd PIP, 2nd MCP, 3rd PIP, 3rd MCP, 4th PIP, 4th MCP, 5th PIP, 5th MCP, hip, ankle, 1st MTP, 2nd MTP, 3rd MTP, 4th MTP, 5th MTP, 1st toe IP, 2nd toe IP, 3rd toe IP, 4th toe IP and 5th toe IP.       Completed Fibromyalgia exam 18/18 tender points.  No data to display     Assessment:       1. Undifferentiated connective tissue disease    2. Primary hypertension    3. Depressive disorder    4. Fibromyalgia    5. Tear of rotator cuff, unspecified laterality, unspecified tear extent, unspecified whether traumatic    6. Primary insomnia              Plan:       Problem List Items Addressed This Visit          Psychiatric    Depressive disorder    Relevant Medications    ALPRAZolam (XANAX) 0.5 MG  tablet    cholecalciferol, vitamin D3, 1,250 mcg (50,000 unit) capsule    EScitalopram oxalate (LEXAPRO) 20 MG tablet    hydrOXYchloroQUINE (PLAQUENIL) 200 mg tablet    omeprazole (PRILOSEC) 40 MG capsule    pregabalin (LYRICA) 100 MG capsule    traMADoL (ULTRAM) 50 mg tablet    zolpidem (AMBIEN) 10 mg Tab    methotrexate 2.5 MG Tab    folic acid (FOLVITE) 1 MG tablet    diclofenac (VOLTAREN) 50 MG EC tablet       Cardiac/Vascular    Hypertension    Relevant Medications    ALPRAZolam (XANAX) 0.5 MG tablet    cholecalciferol, vitamin D3, 1,250 mcg (50,000 unit) capsule    EScitalopram oxalate (LEXAPRO) 20 MG tablet    hydrOXYchloroQUINE (PLAQUENIL) 200 mg tablet    omeprazole (PRILOSEC) 40 MG capsule    pregabalin (LYRICA) 100 MG capsule    traMADoL (ULTRAM) 50 mg tablet    zolpidem (AMBIEN) 10 mg Tab    methotrexate 2.5 MG Tab    folic acid (FOLVITE) 1 MG tablet    diclofenac (VOLTAREN) 50 MG EC tablet       Immunology/Multi System    Undifferentiated connective tissue disease - Primary    Relevant Medications    ALPRAZolam (XANAX) 0.5 MG tablet    cholecalciferol, vitamin D3, 1,250 mcg (50,000 unit) capsule    EScitalopram oxalate (LEXAPRO) 20 MG tablet    hydrOXYchloroQUINE (PLAQUENIL) 200 mg tablet    omeprazole (PRILOSEC) 40 MG capsule    pregabalin (LYRICA) 100 MG capsule    traMADoL (ULTRAM) 50 mg tablet    zolpidem (AMBIEN) 10 mg Tab    methotrexate 2.5 MG Tab    folic acid (FOLVITE) 1 MG tablet    diclofenac (VOLTAREN) 50 MG EC tablet       Orthopedic    Fibromyalgia    Relevant Medications    ALPRAZolam (XANAX) 0.5 MG tablet    cholecalciferol, vitamin D3, 1,250 mcg (50,000 unit) capsule    EScitalopram oxalate (LEXAPRO) 20 MG tablet    hydrOXYchloroQUINE (PLAQUENIL) 200 mg tablet    omeprazole (PRILOSEC) 40 MG capsule    pregabalin (LYRICA) 100 MG capsule    traMADoL (ULTRAM) 50 mg tablet    zolpidem (AMBIEN) 10 mg Tab    methotrexate 2.5 MG Tab    folic acid (FOLVITE) 1 MG tablet    diclofenac (VOLTAREN)  50 MG EC tablet    Tear of rotator cuff    Relevant Medications    ALPRAZolam (XANAX) 0.5 MG tablet    cholecalciferol, vitamin D3, 1,250 mcg (50,000 unit) capsule    EScitalopram oxalate (LEXAPRO) 20 MG tablet    hydrOXYchloroQUINE (PLAQUENIL) 200 mg tablet    omeprazole (PRILOSEC) 40 MG capsule    pregabalin (LYRICA) 100 MG capsule    traMADoL (ULTRAM) 50 mg tablet    zolpidem (AMBIEN) 10 mg Tab    methotrexate 2.5 MG Tab    folic acid (FOLVITE) 1 MG tablet    diclofenac (VOLTAREN) 50 MG EC tablet       Other    Insomnia    Relevant Medications    ALPRAZolam (XANAX) 0.5 MG tablet    cholecalciferol, vitamin D3, 1,250 mcg (50,000 unit) capsule    EScitalopram oxalate (LEXAPRO) 20 MG tablet    hydrOXYchloroQUINE (PLAQUENIL) 200 mg tablet    omeprazole (PRILOSEC) 40 MG capsule    pregabalin (LYRICA) 100 MG capsule    traMADoL (ULTRAM) 50 mg tablet    zolpidem (AMBIEN) 10 mg Tab    methotrexate 2.5 MG Tab    folic acid (FOLVITE) 1 MG tablet    diclofenac (VOLTAREN) 50 MG EC tablet

## 2022-09-30 ENCOUNTER — PROCEDURE VISIT (OUTPATIENT)
Dept: RESPIRATORY THERAPY | Facility: HOSPITAL | Age: 57
End: 2022-09-30
Attending: FAMILY MEDICINE
Payer: MEDICAID

## 2022-09-30 ENCOUNTER — LAB VISIT (OUTPATIENT)
Dept: LAB | Facility: HOSPITAL | Age: 57
End: 2022-09-30
Attending: FAMILY MEDICINE
Payer: MEDICAID

## 2022-09-30 DIAGNOSIS — R00.2 PALPITATIONS: ICD-10-CM

## 2022-09-30 DIAGNOSIS — R06.00 DYSPNEA, UNSPECIFIED TYPE: ICD-10-CM

## 2022-09-30 DIAGNOSIS — I20.89 ANGINA DECUBITUS: Primary | ICD-10-CM

## 2022-09-30 DIAGNOSIS — E78.00 PURE HYPERCHOLESTEROLEMIA: ICD-10-CM

## 2022-09-30 DIAGNOSIS — R53.81 DEBILITY: ICD-10-CM

## 2022-09-30 DIAGNOSIS — R06.02 SHORTNESS OF BREATH: ICD-10-CM

## 2022-09-30 DIAGNOSIS — R05.3 PERSISTENT COUGH: ICD-10-CM

## 2022-09-30 LAB
ALBUMIN SERPL-MCNC: 4.1 GM/DL (ref 3.5–5)
ALBUMIN/GLOB SERPL: 1.2 RATIO (ref 1.1–2)
ALP SERPL-CCNC: 84 UNIT/L (ref 40–150)
ALT SERPL-CCNC: 49 UNIT/L (ref 0–55)
AST SERPL-CCNC: 35 UNIT/L (ref 5–34)
BASOPHILS # BLD AUTO: 0.03 X10(3)/MCL (ref 0–0.2)
BASOPHILS NFR BLD AUTO: 0.7 %
BILIRUBIN DIRECT+TOT PNL SERPL-MCNC: 0.5 MG/DL
BUN SERPL-MCNC: 10.6 MG/DL (ref 8.4–25.7)
CALCIUM SERPL-MCNC: 9.5 MG/DL (ref 8.4–10.2)
CHLORIDE SERPL-SCNC: 107 MMOL/L (ref 98–107)
CHOLEST SERPL-MCNC: 266 MG/DL
CHOLEST/HDLC SERPL: 8 {RATIO} (ref 0–5)
CO2 SERPL-SCNC: 23 MMOL/L (ref 22–29)
CREAT SERPL-MCNC: 0.87 MG/DL (ref 0.73–1.18)
DLCO ADJ PRE: 26.36 ML/(MIN*MMHG) (ref 19.76–33.62)
DLCO SINGLE BREATH LLN: 19.76
DLCO SINGLE BREATH PRE REF: 98.8 %
DLCO SINGLE BREATH REF: 26.69
DLCOC SBVA LLN: 2.87
DLCOC SBVA PRE REF: 122 %
DLCOC SBVA REF: 4.21
DLCOC SINGLE BREATH LLN: 19.76
DLCOC SINGLE BREATH PRE REF: 98.8 %
DLCOC SINGLE BREATH REF: 26.69
DLCOCSBVAULN: 5.54
DLCOCSINGLEBREATHULN: 33.62
DLCOSINGLEBREATHULN: 33.62
DLCOVA LLN: 2.87
DLCOVA PRE REF: 122 %
DLCOVA PRE: 5.14 ML/(MIN*MMHG*L) (ref 2.87–5.54)
DLCOVA REF: 4.21
DLCOVAULN: 5.54
DLVAADJ PRE: 5.14 ML/(MIN*MMHG*L) (ref 2.87–5.54)
EOSINOPHIL # BLD AUTO: 0.17 X10(3)/MCL (ref 0–0.9)
EOSINOPHIL NFR BLD AUTO: 4 %
ERV LLN: -16448.86
ERV PRE REF: 25.6 %
ERV REF: 1.14
ERVULN: ABNORMAL
ERYTHROCYTE [DISTWIDTH] IN BLOOD BY AUTOMATED COUNT: 17.4 % (ref 11.5–17)
EST. AVERAGE GLUCOSE BLD GHB EST-MCNC: 122.6 MG/DL
FEF 25 75 LLN: 1.11
FEF 25 75 PRE REF: 73.2 %
FEF 25 75 REF: 2.52
FEV1 FVC LLN: 68
FEV1 FVC PRE REF: 92.3 %
FEV1 FVC REF: 79
FEV1 LLN: 2.05
FEV1 PRE REF: 91.8 %
FEV1 REF: 2.79
FRCPLETH LLN: 2.36
FRCPLETH PREREF: 71.2 %
FRCPLETH REF: 3.35
FRCPLETHULN: 4.33
FT4I SERPL CALC-MCNC: 2.35 (ref 2.6–3.6)
FVC LLN: 2.64
FVC PRE REF: 99.5 %
FVC REF: 3.51
GFR SERPLBLD CREATININE-BSD FMLA CKD-EPI: >60 MLS/MIN/1.73/M2
GLOBULIN SER-MCNC: 3.3 GM/DL (ref 2.4–3.5)
GLUCOSE SERPL-MCNC: 92 MG/DL (ref 74–100)
HBA1C MFR BLD: 5.9 %
HCT VFR BLD AUTO: 47.8 % (ref 42–52)
HDLC SERPL-MCNC: 35 MG/DL (ref 35–60)
HGB BLD-MCNC: 15 GM/DL (ref 14–18)
IMM GRANULOCYTES # BLD AUTO: 0.02 X10(3)/MCL (ref 0–0.04)
IMM GRANULOCYTES NFR BLD AUTO: 0.5 %
IVC PRE: 3.16 L (ref 2.64–4.39)
IVC SINGLE BREATH LLN: 2.64
IVC SINGLE BREATH PRE REF: 89.9 %
IVC SINGLE BREATH REF: 3.51
IVCSINGLEBREATHULN: 4.39
LDLC SERPL CALC-MCNC: 188 MG/DL (ref 50–140)
LLN IC: -9999997.11
LYMPHOCYTES # BLD AUTO: 1.31 X10(3)/MCL (ref 0.6–4.6)
LYMPHOCYTES NFR BLD AUTO: 31 %
MCH RBC QN AUTO: 26.7 PG (ref 27–31)
MCHC RBC AUTO-ENTMCNC: 31.4 MG/DL (ref 33–36)
MCV RBC AUTO: 85.2 FL (ref 80–94)
MONOCYTES # BLD AUTO: 0.33 X10(3)/MCL (ref 0.1–1.3)
MONOCYTES NFR BLD AUTO: 7.8 %
MVV LLN: 106
MVV PRE REF: 79.6 %
MVV REF: 124
NEUTROPHILS # BLD AUTO: 2.4 X10(3)/MCL (ref 2.1–9.2)
NEUTROPHILS NFR BLD AUTO: 56 %
NRBC BLD AUTO-RTO: 0 %
PEF LLN: 5.28
PEF PRE REF: 80.4 %
PEF REF: 7.65
PLATELET # BLD AUTO: 236 X10(3)/MCL (ref 130–400)
PMV BLD AUTO: 10.5 FL (ref 7.4–10.4)
POTASSIUM SERPL-SCNC: 4.2 MMOL/L (ref 3.5–5.1)
PRE DLCO: 26.36 ML/(MIN*MMHG) (ref 19.76–33.62)
PRE ERV: 0.29 L (ref -16448.86–16451.14)
PRE FEF 25 75: 1.85 L/S (ref 1.11–3.94)
PRE FET 100: 9.72 SEC
PRE FEV1 FVC: 73.23 % (ref 68.04–90.66)
PRE FEV1: 2.56 L (ref 2.05–3.53)
PRE FRC PL: 2.38 L
PRE FVC: 3.5 L (ref 2.64–4.39)
PRE IC: 3.13 L (ref -9999997.11–#######.####)
PRE MVV: 99 L/MIN (ref 105.74–143.06)
PRE PEF: 6.15 L/S (ref 5.28–10.02)
PRE REF IC: 108.4 %
PRE RV: 2.02 L (ref 1.53–2.88)
PRE TLC: 5.51 L (ref 5.19–7.49)
PRE VTG: 3.06 L
PROT SERPL-MCNC: 7.4 GM/DL (ref 6.4–8.3)
PSA SERPL-MCNC: 0.84 NG/ML
RAW PRE REF: 90.9 %
RAW PRE: 2.78 CMH2O*S/L (ref 3.06–3.06)
RAW REF: 3.06
RBC # BLD AUTO: 5.61 X10(6)/MCL (ref 4.7–6.1)
REF IC: 2.89
RV LLN: 1.53
RV PRE REF: 91.5 %
RV REF: 2.2
RVTLC LLN: 27
RVTLC PRE REF: 102.1 %
RVTLC PRE: 36.56 % (ref 26.82–44.78)
RVTLC REF: 36
RVTLCULN: 45
RVULN: 2.88
SGAW PRE REF: 181.3 %
SGAW PRE: 0.15 1/(CMH2O*S) (ref 0.08–0.08)
SGAW REF: 0.08
SODIUM SERPL-SCNC: 137 MMOL/L (ref 136–145)
T3RU NFR SERPL: 35.44 % (ref 31–39)
T4 SERPL-MCNC: 6.63 UG/DL (ref 4.87–11.72)
TLC LLN: 5.19
TLC PRE REF: 86.9 %
TLC REF: 6.34
TLC ULN: 7.49
TRIGL SERPL-MCNC: 213 MG/DL (ref 34–140)
TSH SERPL-ACNC: 0.89 UIU/ML (ref 0.35–4.94)
ULN IC: ABNORMAL
VA PRE: 5.14 L (ref 6.19–6.19)
VA SINGLE BREATH PRE REF: 83 %
VA SINGLE BREATH REF: 6.19
VC LLN: 2.64
VC PRE REF: 99.5 %
VC PRE: 3.5 L (ref 2.64–4.39)
VC REF: 3.51
VC ULN: 4.39
VLDLC SERPL CALC-MCNC: 43 MG/DL
WBC # SPEC AUTO: 4.2 X10(3)/MCL (ref 4.5–11.5)

## 2022-09-30 PROCEDURE — 80053 COMPREHEN METABOLIC PANEL: CPT

## 2022-09-30 PROCEDURE — 83036 HEMOGLOBIN GLYCOSYLATED A1C: CPT

## 2022-09-30 PROCEDURE — 94010 BREATHING CAPACITY TEST: CPT

## 2022-09-30 PROCEDURE — 84436 ASSAY OF TOTAL THYROXINE: CPT

## 2022-09-30 PROCEDURE — 80061 LIPID PANEL: CPT

## 2022-09-30 PROCEDURE — 36415 COLL VENOUS BLD VENIPUNCTURE: CPT

## 2022-09-30 PROCEDURE — 84443 ASSAY THYROID STIM HORMONE: CPT

## 2022-09-30 PROCEDURE — 94727 GAS DIL/WSHOT DETER LNG VOL: CPT

## 2022-09-30 PROCEDURE — 94729 DIFFUSING CAPACITY: CPT

## 2022-09-30 PROCEDURE — 85025 COMPLETE CBC W/AUTO DIFF WBC: CPT

## 2022-09-30 PROCEDURE — 84479 ASSAY OF THYROID (T3 OR T4): CPT

## 2022-09-30 PROCEDURE — 84153 ASSAY OF PSA TOTAL: CPT

## 2022-10-10 DIAGNOSIS — M35.9 UNDIFFERENTIATED CONNECTIVE TISSUE DISEASE: ICD-10-CM

## 2022-10-10 DIAGNOSIS — I10 PRIMARY HYPERTENSION: ICD-10-CM

## 2022-10-10 DIAGNOSIS — M79.7 FIBROMYALGIA: ICD-10-CM

## 2022-10-10 DIAGNOSIS — F32.A DEPRESSIVE DISORDER: ICD-10-CM

## 2022-10-10 DIAGNOSIS — M75.100 TEAR OF ROTATOR CUFF, UNSPECIFIED LATERALITY, UNSPECIFIED TEAR EXTENT, UNSPECIFIED WHETHER TRAUMATIC: ICD-10-CM

## 2022-10-10 DIAGNOSIS — F51.01 PRIMARY INSOMNIA: ICD-10-CM

## 2022-10-11 RX ORDER — HYDROCODONE BITARTRATE AND ACETAMINOPHEN 10; 325 MG/1; MG/1
TABLET ORAL
Qty: 90 TABLET | Refills: 0 | Status: SHIPPED | OUTPATIENT
Start: 2022-10-11 | End: 2022-11-08

## 2022-10-12 ENCOUNTER — TELEPHONE (OUTPATIENT)
Dept: RHEUMATOLOGY | Facility: CLINIC | Age: 57
End: 2022-10-12
Payer: MEDICAID

## 2022-10-12 NOTE — TELEPHONE ENCOUNTER
----- Message from Regina Sepulveda sent at 10/12/2022 10:33 AM CDT -----  Regarding: Medication clarification  Chelsea from pharmacy called stating that some medications may be duplications between 2 doctors. Please call her @ [phone].

## 2022-10-12 NOTE — TELEPHONE ENCOUNTER
Called Chelsea back from the Pharmacy.  I gave her the list of medications from his clinical summary dated 09/16/2022 that he is to start and d/c.  She will notify the patient of this.  Apparently Dr. Viramontes and another  Are duplicating Rx's for B/P.

## 2022-11-07 DIAGNOSIS — I10 PRIMARY HYPERTENSION: ICD-10-CM

## 2022-11-07 DIAGNOSIS — M75.100 TEAR OF ROTATOR CUFF, UNSPECIFIED LATERALITY, UNSPECIFIED TEAR EXTENT, UNSPECIFIED WHETHER TRAUMATIC: ICD-10-CM

## 2022-11-07 DIAGNOSIS — M35.9 UNDIFFERENTIATED CONNECTIVE TISSUE DISEASE: ICD-10-CM

## 2022-11-07 DIAGNOSIS — M79.7 FIBROMYALGIA: ICD-10-CM

## 2022-11-07 DIAGNOSIS — F32.A DEPRESSIVE DISORDER: ICD-10-CM

## 2022-11-07 DIAGNOSIS — F51.01 PRIMARY INSOMNIA: ICD-10-CM

## 2022-11-08 RX ORDER — HYDROCODONE BITARTRATE AND ACETAMINOPHEN 10; 325 MG/1; MG/1
1 TABLET ORAL EVERY 8 HOURS PRN
Qty: 90 TABLET | Refills: 0 | Status: SHIPPED | OUTPATIENT
Start: 2022-11-08 | End: 2022-12-08

## 2022-12-01 ENCOUNTER — LAB VISIT (OUTPATIENT)
Dept: LAB | Facility: HOSPITAL | Age: 57
End: 2022-12-01
Attending: INTERNAL MEDICINE
Payer: MEDICAID

## 2022-12-01 DIAGNOSIS — R55 SYNCOPE AND COLLAPSE: ICD-10-CM

## 2022-12-01 DIAGNOSIS — I49.5 SINOATRIAL NODE DYSFUNCTION: Primary | ICD-10-CM

## 2022-12-01 DIAGNOSIS — E78.00 PURE HYPERCHOLESTEROLEMIA: ICD-10-CM

## 2022-12-01 DIAGNOSIS — R53.81 DEBILITY: ICD-10-CM

## 2022-12-01 LAB
ALBUMIN SERPL-MCNC: 4.6 GM/DL (ref 3.5–5)
ALBUMIN/GLOB SERPL: 1.3 RATIO (ref 1.1–2)
ALP SERPL-CCNC: 85 UNIT/L (ref 40–150)
ALT SERPL-CCNC: 26 UNIT/L (ref 0–55)
AST SERPL-CCNC: 23 UNIT/L (ref 5–34)
BILIRUBIN DIRECT+TOT PNL SERPL-MCNC: 0.7 MG/DL
BUN SERPL-MCNC: 13 MG/DL (ref 8.4–25.7)
CALCIUM SERPL-MCNC: 9.9 MG/DL (ref 8.4–10.2)
CHLORIDE SERPL-SCNC: 104 MMOL/L (ref 98–107)
CHOLEST SERPL-MCNC: 294 MG/DL
CHOLEST/HDLC SERPL: 9 {RATIO} (ref 0–5)
CO2 SERPL-SCNC: 21 MMOL/L (ref 22–29)
CREAT SERPL-MCNC: 0.91 MG/DL (ref 0.73–1.18)
ERYTHROCYTE [DISTWIDTH] IN BLOOD BY AUTOMATED COUNT: 16.2 % (ref 11.5–17)
EST. AVERAGE GLUCOSE BLD GHB EST-MCNC: 119.8 MG/DL
GFR SERPLBLD CREATININE-BSD FMLA CKD-EPI: >60 MLS/MIN/1.73/M2
GLOBULIN SER-MCNC: 3.5 GM/DL (ref 2.4–3.5)
GLUCOSE SERPL-MCNC: 87 MG/DL (ref 74–100)
HBA1C MFR BLD: 5.8 %
HCT VFR BLD AUTO: 49.4 % (ref 42–52)
HDLC SERPL-MCNC: 33 MG/DL (ref 35–60)
HGB BLD-MCNC: 15.8 GM/DL (ref 14–18)
INR BLD: 0.91 (ref 0–1.3)
LDLC SERPL CALC-MCNC: 228 MG/DL (ref 50–140)
MAGNESIUM SERPL-MCNC: 1.9 MG/DL (ref 1.6–2.6)
MCH RBC QN AUTO: 27.2 PG (ref 27–31)
MCHC RBC AUTO-ENTMCNC: 32 MG/DL (ref 33–36)
MCV RBC AUTO: 85.2 FL (ref 80–94)
NRBC BLD AUTO-RTO: 0 %
PLATELET # BLD AUTO: 249 X10(3)/MCL (ref 130–400)
PMV BLD AUTO: 11.5 FL (ref 7.4–10.4)
POTASSIUM SERPL-SCNC: 4.3 MMOL/L (ref 3.5–5.1)
PROT SERPL-MCNC: 8.1 GM/DL (ref 6.4–8.3)
PROTHROMBIN TIME: 12.2 SECONDS (ref 12.5–14.5)
RBC # BLD AUTO: 5.8 X10(6)/MCL (ref 4.7–6.1)
SODIUM SERPL-SCNC: 136 MMOL/L (ref 136–145)
TRIGL SERPL-MCNC: 167 MG/DL (ref 34–140)
TSH SERPL-ACNC: 1.24 UIU/ML (ref 0.35–4.94)
VLDLC SERPL CALC-MCNC: 33 MG/DL
WBC # SPEC AUTO: 5.9 X10(3)/MCL (ref 4.5–11.5)

## 2022-12-01 PROCEDURE — 80053 COMPREHEN METABOLIC PANEL: CPT

## 2022-12-01 PROCEDURE — 85610 PROTHROMBIN TIME: CPT

## 2022-12-01 PROCEDURE — 36415 COLL VENOUS BLD VENIPUNCTURE: CPT

## 2022-12-01 PROCEDURE — 80061 LIPID PANEL: CPT

## 2022-12-01 PROCEDURE — 84443 ASSAY THYROID STIM HORMONE: CPT

## 2022-12-01 PROCEDURE — 85027 COMPLETE CBC AUTOMATED: CPT

## 2022-12-01 PROCEDURE — 83036 HEMOGLOBIN GLYCOSYLATED A1C: CPT

## 2022-12-01 PROCEDURE — 83735 ASSAY OF MAGNESIUM: CPT

## 2022-12-01 PROCEDURE — 93005 ELECTROCARDIOGRAM TRACING: CPT

## 2022-12-02 ENCOUNTER — HOSPITAL ENCOUNTER (OUTPATIENT)
Facility: HOSPITAL | Age: 57
Discharge: HOME | End: 2022-12-02
Attending: INTERNAL MEDICINE | Admitting: INTERNAL MEDICINE
Payer: MEDICAID

## 2022-12-02 VITALS
BODY MASS INDEX: 40.99 KG/M2 | HEIGHT: 66 IN | TEMPERATURE: 98 F | SYSTOLIC BLOOD PRESSURE: 131 MMHG | HEART RATE: 86 BPM | DIASTOLIC BLOOD PRESSURE: 93 MMHG | WEIGHT: 255.06 LBS | OXYGEN SATURATION: 95 %

## 2022-12-02 DIAGNOSIS — I49.5 SINOATRIAL NODE DYSFUNCTION: ICD-10-CM

## 2022-12-02 PROBLEM — Z95.818 STATUS POST PLACEMENT OF IMPLANTABLE LOOP RECORDER: Chronic | Status: ACTIVE | Noted: 2022-12-02

## 2022-12-02 PROCEDURE — 99153 MOD SED SAME PHYS/QHP EA: CPT | Performed by: INTERNAL MEDICINE

## 2022-12-02 PROCEDURE — 63600175 PHARM REV CODE 636 W HCPCS: Performed by: INTERNAL MEDICINE

## 2022-12-02 PROCEDURE — 33208 INSRT HEART PM ATRIAL & VENT: CPT

## 2022-12-02 PROCEDURE — C1898 LEAD, PMKR, OTHER THAN TRANS: HCPCS | Performed by: INTERNAL MEDICINE

## 2022-12-02 PROCEDURE — 99152 MOD SED SAME PHYS/QHP 5/>YRS: CPT | Performed by: INTERNAL MEDICINE

## 2022-12-02 PROCEDURE — C1785 PMKR, DUAL, RATE-RESP: HCPCS | Performed by: INTERNAL MEDICINE

## 2022-12-02 PROCEDURE — 25000003 PHARM REV CODE 250: Performed by: INTERNAL MEDICINE

## 2022-12-02 PROCEDURE — 33286 RMVL SUBQ CAR RHYTHM MNTR: CPT

## 2022-12-02 DEVICE — PULSE GENERATOR
Type: IMPLANTABLE DEVICE | Site: HEART | Status: FUNCTIONAL
Brand: ASSURITY MRI™

## 2022-12-02 DEVICE — PACING LEAD
Type: IMPLANTABLE DEVICE | Site: HEART | Status: FUNCTIONAL
Brand: TENDRIL™

## 2022-12-02 RX ORDER — LOSARTAN POTASSIUM 50 MG/1
50 TABLET ORAL DAILY
COMMUNITY

## 2022-12-02 RX ORDER — HYDROMORPHONE HYDROCHLORIDE 2 MG/ML
1 INJECTION, SOLUTION INTRAMUSCULAR; INTRAVENOUS; SUBCUTANEOUS EVERY 4 HOURS PRN
Status: DISCONTINUED | OUTPATIENT
Start: 2022-12-02 | End: 2022-12-02 | Stop reason: HOSPADM

## 2022-12-02 RX ORDER — CYCLOBENZAPRINE HCL 10 MG
10 TABLET ORAL 3 TIMES DAILY
COMMUNITY
End: 2023-05-25 | Stop reason: SDUPTHER

## 2022-12-02 RX ORDER — DIAZEPAM 5 MG/1
5 TABLET ORAL
Status: DISPENSED | OUTPATIENT
Start: 2022-12-02

## 2022-12-02 RX ORDER — FENTANYL CITRATE 50 UG/ML
INJECTION, SOLUTION INTRAMUSCULAR; INTRAVENOUS
Status: DISCONTINUED | OUTPATIENT
Start: 2022-12-02 | End: 2022-12-02 | Stop reason: HOSPADM

## 2022-12-02 RX ORDER — ASPIRIN 81 MG/1
81 TABLET ORAL DAILY
COMMUNITY
End: 2023-01-30

## 2022-12-02 RX ORDER — HYDRALAZINE HYDROCHLORIDE 20 MG/ML
10 INJECTION INTRAMUSCULAR; INTRAVENOUS ONCE
Status: COMPLETED | OUTPATIENT
Start: 2022-12-02 | End: 2022-12-02

## 2022-12-02 RX ORDER — CEFAZOLIN SODIUM 1 G/3ML
1 INJECTION, POWDER, FOR SOLUTION INTRAMUSCULAR; INTRAVENOUS
Status: DISCONTINUED | OUTPATIENT
Start: 2022-12-02 | End: 2022-12-02 | Stop reason: HOSPADM

## 2022-12-02 RX ORDER — CEFAZOLIN SODIUM 1 G/3ML
INJECTION, POWDER, FOR SOLUTION INTRAMUSCULAR; INTRAVENOUS
Status: DISCONTINUED | OUTPATIENT
Start: 2022-12-02 | End: 2022-12-02 | Stop reason: HOSPADM

## 2022-12-02 RX ORDER — CEFAZOLIN SODIUM 1 G/3ML
1 INJECTION, POWDER, FOR SOLUTION INTRAMUSCULAR; INTRAVENOUS
Status: DISCONTINUED | OUTPATIENT
Start: 2022-12-02 | End: 2022-12-02

## 2022-12-02 RX ORDER — HYDROMORPHONE HYDROCHLORIDE 2 MG/ML
0.5 INJECTION, SOLUTION INTRAMUSCULAR; INTRAVENOUS; SUBCUTANEOUS EVERY 4 HOURS PRN
Status: DISCONTINUED | OUTPATIENT
Start: 2022-12-02 | End: 2022-12-02 | Stop reason: HOSPADM

## 2022-12-02 RX ORDER — IBUPROFEN 800 MG/1
800 TABLET ORAL EVERY 6 HOURS PRN
Qty: 30 TABLET | Refills: 1 | Status: SHIPPED | OUTPATIENT
Start: 2022-12-02 | End: 2023-05-25

## 2022-12-02 RX ORDER — SODIUM CHLORIDE 0.9 % (FLUSH) 0.9 %
10 SYRINGE (ML) INJECTION
Status: ACTIVE | OUTPATIENT
Start: 2022-12-02

## 2022-12-02 RX ORDER — MIDAZOLAM HYDROCHLORIDE 1 MG/ML
INJECTION INTRAMUSCULAR; INTRAVENOUS
Status: DISCONTINUED | OUTPATIENT
Start: 2022-12-02 | End: 2022-12-02 | Stop reason: HOSPADM

## 2022-12-02 RX ORDER — IBUPROFEN 600 MG/1
600 TABLET ORAL EVERY 6 HOURS PRN
Status: DISCONTINUED | OUTPATIENT
Start: 2022-12-02 | End: 2022-12-02 | Stop reason: HOSPADM

## 2022-12-02 RX ORDER — CIPROFLOXACIN 500 MG/1
500 TABLET ORAL EVERY 8 HOURS
Qty: 30 TABLET | Refills: 1 | Status: SHIPPED | OUTPATIENT
Start: 2022-12-02 | End: 2023-01-30

## 2022-12-02 RX ORDER — DIPHENHYDRAMINE HCL 25 MG
50 CAPSULE ORAL
Status: DISPENSED | OUTPATIENT
Start: 2022-12-02

## 2022-12-02 RX ORDER — LIDOCAINE HYDROCHLORIDE 20 MG/ML
INJECTION, SOLUTION INFILTRATION; PERINEURAL
Status: DISCONTINUED | OUTPATIENT
Start: 2022-12-02 | End: 2022-12-02 | Stop reason: HOSPADM

## 2022-12-02 RX ADMIN — DIPHENHYDRAMINE HYDROCHLORIDE 50 MG: 25 CAPSULE ORAL at 01:12

## 2022-12-02 RX ADMIN — DIAZEPAM 5 MG: 5 TABLET ORAL at 01:12

## 2022-12-02 RX ADMIN — HYDRALAZINE HYDROCHLORIDE 10 MG: 20 INJECTION INTRAMUSCULAR; INTRAVENOUS at 04:12

## 2022-12-02 NOTE — Clinical Note
The lead was inserted under fluorscopic guidance and thresholds were tested. The lead was inserted in the right ventricle.

## 2022-12-02 NOTE — Clinical Note
The chest was prepped. The site was prepped with ChloraPrep. The site was clipped. The patient was draped. The patient was positioned supine. The patient was secured using safety straps and with ulnar pads.

## 2022-12-02 NOTE — DISCHARGE SUMMARY
[facility]                       [address] [place] [address]    PATIENT NAME:       MARTINEZ AVALOS  YOB: 1965  CSN:                [id number]   MRN:                [id number]  ADMIT DATE:         12/02/2022 09:31:00  PHYSICIAN:          Hemant Maravilla, DO                          DISCHARGE SUMMARY    DATE OF DISCHARGE:      HISTORY AND HOSPITAL COURSE:  Mr. Avalos was admitted for implantation of a [employer] dual-chamber permanent pacemaker for symptomatic sick sinus   syndrome and as well as explant of his implant loop recorder.      He was brought to the cardiac catheterization laboratory earlier today and under   fluoroscopic guidance and ultrasound guidance, the [employer]   dual-chamber permanent pacemaker was implanted in the left infraclavicular   space.  We then followed with explant of the implantable loop recorder located   in the left parasternal location.  Sterile surgical technique was employed.  The   specifics are outlined in the operative report.      Postoperative course has been unremarkable with discharge home today.    DISCHARGE LABORATORY DATA:  Postprocedure chest x-ray is pending.    DISCHARGE DIAGNOSES:    1. Symptomatic sick sinus syndrome.  2. Status post implantation of a [employer] dual-chamber permanent   pacemaker.  3. Status post explant of implant loop recorder.  4. Hypertension.  5. History of cerebrovascular accident.  6. Dyslipidemia.    RECOMMENDATIONS:    1. The patient will be arranged for discharge home today to continue on the same   medications as on admission.    2. I have asked him to call or return should he have any further problems or   complaints and I will be checking on his progress in my office in the very near   future.  We will continue to monitor his progress closely and titrate his   medical therapies as tolerated.    3. He will also be  discharged home on empiric antibiotics of Cipro 500 mg t.i.d.   q.8 hours and ibuprofen 800 mg q.6 hours as needed for pain.    4. He will be discharged home with wound care instructions as well.  His device   will be monitored.    5. Further recommendations forthcoming.        ______________________________  Hemant Maravilla DO    CAT/AQS  DD:  12/02/2022  Time:  04:06PM  DT:  12/02/2022  Time:  04:46PM  Job #:  [id number]      DISCHARGE SUMMARY

## 2022-12-02 NOTE — Clinical Note
The lead was inserted under fluorscopic guidance and thresholds were tested. The lead was inserted in the right atrium.

## 2022-12-02 NOTE — DISCHARGE SUMMARY
[facility] - Cath Lab Services  Discharge Note  Short Stay    Procedure(s) (LRB):  INSERTION, CARDIAC PACEMAKER, DUAL CHAMBER (N/A)      OUTCOME: Condition has improved and patient is now ready for discharge.    DISPOSITION: Home or Self Care    FINAL DIAGNOSIS:  <principal problem not specified>    FOLLOWUP: In clinic    DISCHARGE INSTRUCTIONS:  No discharge procedures on file.     TIME SPENT ON DISCHARGE: 120 minutes.     S/P Successful implantation of SJM DDD PPM in the L infraclavicular space then Explant of ILR.   See op report on chart.

## 2022-12-02 NOTE — OP NOTE
S/P Successful implantation of SJM DDD PPM in the L infraclavicular space then Explant of ILR.   See op report on chart.

## 2022-12-02 NOTE — DISCHARGE INSTRUCTIONS
Go to ER if unusual symptoms occur. Apply ice for first 24 hours, every couple hours. Wear sling for first 24 hours then nightly & when taking naps x 6 weeks. Keep wounds dry & leave dressings on till Tuesday & you may remove them after that & place large bandage over them. Staples should be removed at follow up visit. Prescriptions for Ibuprofen 800 & Cipro 500 sent to [facility] [id number] in [place]. Complete FULL dose of antibiotic (Cipro) therapy. Monitor for signs of infection & bleeding.

## 2022-12-07 DIAGNOSIS — M35.9 UNDIFFERENTIATED CONNECTIVE TISSUE DISEASE: ICD-10-CM

## 2022-12-07 DIAGNOSIS — M79.7 FIBROMYALGIA: ICD-10-CM

## 2022-12-07 DIAGNOSIS — I10 PRIMARY HYPERTENSION: ICD-10-CM

## 2022-12-07 DIAGNOSIS — F51.01 PRIMARY INSOMNIA: ICD-10-CM

## 2022-12-07 DIAGNOSIS — M75.100 TEAR OF ROTATOR CUFF, UNSPECIFIED LATERALITY, UNSPECIFIED TEAR EXTENT, UNSPECIFIED WHETHER TRAUMATIC: ICD-10-CM

## 2022-12-07 DIAGNOSIS — F32.A DEPRESSIVE DISORDER: ICD-10-CM

## 2022-12-08 RX ORDER — HYDROCODONE BITARTRATE AND ACETAMINOPHEN 10; 325 MG/1; MG/1
TABLET ORAL
Qty: 90 TABLET | Refills: 0 | Status: SHIPPED | OUTPATIENT
Start: 2022-12-08 | End: 2022-12-09 | Stop reason: SDUPTHER

## 2022-12-09 DIAGNOSIS — M35.9 UNDIFFERENTIATED CONNECTIVE TISSUE DISEASE: ICD-10-CM

## 2022-12-09 DIAGNOSIS — I10 PRIMARY HYPERTENSION: ICD-10-CM

## 2022-12-09 DIAGNOSIS — M75.100 TEAR OF ROTATOR CUFF, UNSPECIFIED LATERALITY, UNSPECIFIED TEAR EXTENT, UNSPECIFIED WHETHER TRAUMATIC: ICD-10-CM

## 2022-12-09 DIAGNOSIS — F32.A DEPRESSIVE DISORDER: ICD-10-CM

## 2022-12-09 DIAGNOSIS — M79.7 FIBROMYALGIA: ICD-10-CM

## 2022-12-09 DIAGNOSIS — F51.01 PRIMARY INSOMNIA: ICD-10-CM

## 2022-12-09 RX ORDER — HYDROCODONE BITARTRATE AND ACETAMINOPHEN 10; 325 MG/1; MG/1
1 TABLET ORAL EVERY 8 HOURS PRN
Qty: 90 TABLET | Refills: 0 | Status: SHIPPED | OUTPATIENT
Start: 2022-12-09 | End: 2023-01-09 | Stop reason: SDUPTHER

## 2022-12-09 NOTE — TELEPHONE ENCOUNTER
[facility] called stating they need another Rx for Norco 10/325.  It must say greater than  7 days on it.

## 2022-12-13 DIAGNOSIS — D17.9: Primary | ICD-10-CM

## 2022-12-14 ENCOUNTER — HOSPITAL ENCOUNTER (EMERGENCY)
Facility: HOSPITAL | Age: 57
Discharge: HOME | End: 2022-12-14
Attending: FAMILY MEDICINE
Payer: MEDICAID

## 2022-12-14 VITALS
HEART RATE: 84 BPM | HEIGHT: 66 IN | TEMPERATURE: 98 F | WEIGHT: 260 LBS | SYSTOLIC BLOOD PRESSURE: 164 MMHG | RESPIRATION RATE: 20 BRPM | BODY MASS INDEX: 41.78 KG/M2 | DIASTOLIC BLOOD PRESSURE: 104 MMHG | OXYGEN SATURATION: 98 %

## 2022-12-14 DIAGNOSIS — L03.317 CELLULITIS OF BUTTOCK: Primary | ICD-10-CM

## 2022-12-14 PROCEDURE — 99284 EMERGENCY DEPT VISIT MOD MDM: CPT

## 2022-12-14 RX ORDER — AMOXICILLIN AND CLAVULANATE POTASSIUM 875; 125 MG/1; MG/1
1 TABLET, FILM COATED ORAL 2 TIMES DAILY
Qty: 14 TABLET | Refills: 0 | Status: SHIPPED | OUTPATIENT
Start: 2022-12-14 | End: 2023-02-21 | Stop reason: ALTCHOICE

## 2022-12-14 RX ORDER — MUPIROCIN 20 MG/G
OINTMENT TOPICAL 3 TIMES DAILY
Qty: 30 G | Refills: 0 | Status: ON HOLD | OUTPATIENT
Start: 2022-12-14 | End: 2024-02-27 | Stop reason: HOSPADM

## 2022-12-14 NOTE — ED PROVIDER NOTES
Encounter Date: 12/14/2022       History     Chief Complaint   Patient presents with    skin concern      Pt presents with concern of skin tag to right lower gluteus, has had for a while however last Sunday began throbbing and yesterday began having pain and bleeding to area      57-year-old presents with large skin tag this irritated on the right butt cheek.  Saw his doctor on Monday who scheduled him an appointment with a surgeon to get it removed but it is just become more irritated and tender and painful no signs of abscess around it more just an abrasion to the large skin tag that is now getting infected no fevers no chills said his doctor is aware of it in his appointment is pending but he would like some medicine while he is waiting to see the surgeon      Review of patient's allergies indicates:   Allergen Reactions    Peanut Anaphylaxis     Past Medical History:   Diagnosis Date    CVA (cerebral vascular accident)     Epilepsy     GERD (gastroesophageal reflux disease)     Hyperlipidemia     Hypertension     KRISTIN on CPAP     TIA (transient ischemic attack)      Past Surgical History:   Procedure Laterality Date    A-V CARDIAC PACEMAKER INSERTION N/A 12/2/2022    Procedure: INSERTION, CARDIAC PACEMAKER, DUAL CHAMBER;  Surgeon: Hemant Maravilla DO;  Location: [facility] CATH LAB;  Service: Cardiology;  Laterality: N/A;  EXPLANT OF EVENT RECORDER /  IMPLANT OF DUAL CHAMBER PPM (SJM)    HIP SURGERY Left     Marito inserted from knee to hip    INSERTION OF IMPLANTABLE LOOP RECORDER  03/25/2022    Dr. Maravilla    INSERTION OF PERMANENT PACEMAKER  12/02/2022    Dr. Maravilla    REMOVAL OF IMPLANTABLE LOOP RECORDER  12/02/2022    Dr. Maravilla    THUMB SURGERY       No family history on file.  Social History     Tobacco Use    Smoking status: Every Day     Packs/day: 0.50     Types: Cigarettes    Smokeless tobacco: Never    Tobacco comments:     Nicotine patches used    Substance Use Topics    Alcohol use: Never    Drug use: Never      Review of Systems   Skin:  Positive for wound.   All other systems reviewed and are negative.    Physical Exam     Initial Vitals [12/14/22 0733]   BP Pulse Resp Temp SpO2   (!) 164/104 84 20 98 °F (36.7 °C) 98 %      MAP       --         Physical Exam    Nursing note and vitals reviewed.  Constitutional: He appears well-developed and well-nourished. He is active.   HENT:   Head: Normocephalic and atraumatic.   Eyes: Conjunctivae, EOM and lids are normal. Pupils are equal, round, and reactive to light.   Neck: Trachea normal and phonation normal. Neck supple. No thyroid mass present.   Normal range of motion.  Cardiovascular:  Normal rate, regular rhythm, normal heart sounds and normal pulses.           Pulmonary/Chest: Breath sounds normal.   Abdominal: Abdomen is soft. Bowel sounds are normal.   Musculoskeletal:         General: Normal range of motion.      Cervical back: Normal range of motion and neck supple.     Neurological: He is alert and oriented to person, place, and time. He has normal strength and normal reflexes.   Skin: Skin is warm and intact.   Large skin tag with some abrasions on the right butt cheek   Psychiatric: He has a normal mood and affect. His speech is normal and behavior is normal. Judgment and thought content normal. Cognition and memory are normal.       ED Course   Procedures  Labs Reviewed - No data to display       Imaging Results    None          Medications - No data to display                           Clinical Impression:   Final diagnoses:  [L03.317] Cellulitis of buttock (Primary)        ED Disposition Condition    Discharge Stable          ED Prescriptions       Medication Sig Dispense Start Date End Date Auth. Provider    mupirocin (BACTROBAN) 2 % ointment Apply topically 3 (three) times daily. 30 g 12/14/2022 -- Thong Oswald MD    amoxicillin-clavulanate 875-125mg (AUGMENTIN) 875-125 mg per tablet Take 1 tablet by mouth 2 (two) times daily. 14 tablet 12/14/2022 --  Thong Oswald MD          Follow-up Information    None          Thong Oswald MD  12/14/22 6931

## 2022-12-20 ENCOUNTER — OFFICE VISIT (OUTPATIENT)
Dept: SURGERY | Facility: CLINIC | Age: 57
End: 2022-12-20
Payer: MEDICAID

## 2022-12-20 VITALS
HEIGHT: 66 IN | TEMPERATURE: 93 F | HEART RATE: 77 BPM | OXYGEN SATURATION: 97 % | DIASTOLIC BLOOD PRESSURE: 73 MMHG | BODY MASS INDEX: 41.19 KG/M2 | WEIGHT: 256.31 LBS | SYSTOLIC BLOOD PRESSURE: 116 MMHG

## 2022-12-20 DIAGNOSIS — D17.9: ICD-10-CM

## 2022-12-20 PROCEDURE — 99215 OFFICE O/P EST HI 40 MIN: CPT | Mod: PBBFAC

## 2022-12-20 NOTE — PROGRESS NOTES
Seen by Dr. Bruce.  Carotid ultrasound ordered.  RTC for procedure clinic. Contact PCP for clearance.

## 2022-12-20 NOTE — PROGRESS NOTES
I have reviewed the notes, assessments, and/or procedures performed by the resident, I concur with her/his documentation of Darius Restrepo.     Batsheva Onofre MD

## 2022-12-20 NOTE — PROGRESS NOTES
[facility] Clinic Note    HPI: 57M hx of TIA on plavix, sick sinus syndrome with recent pacemaker placement p/w R buttock skin lesion. States it has been there for many years only recent started hurting.   Denies any other past medical history. Denies anal lesions. Normal bowel habits.    PMH:   Past Medical History:   Diagnosis Date    CVA (cerebral vascular accident)     Epilepsy     GERD (gastroesophageal reflux disease)     Hyperlipidemia     Hypertension     KRISTIN on CPAP     TIA (transient ischemic attack)       Meds:   Current Outpatient Medications:     ALPRAZolam (XANAX) 0.5 MG tablet, Take 1 tablet (0.5 mg total) by mouth 2 (two) times daily as needed for Anxiety., Disp: 60 tablet, Rfl: 5    amoxicillin-clavulanate 875-125mg (AUGMENTIN) 875-125 mg per tablet, Take 1 tablet by mouth 2 (two) times daily., Disp: 14 tablet, Rfl: 0    atorvastatin (LIPITOR) 20 MG tablet, Take 20 mg by mouth once daily., Disp: , Rfl:     cholecalciferol, vitamin D3, 1,250 mcg (50,000 unit) capsule, Take 1 capsule (50,000 Units total) by mouth every 7 days. (Patient taking differently: Take 50,000 Units by mouth every Monday.), Disp: 5 capsule, Rfl: 5    ciprofloxacin HCl (CIPRO) 500 MG tablet, Take 1 tablet (500 mg total) by mouth every 8 (eight) hours., Disp: 30 tablet, Rfl: 1    cloNIDine 0.3 mg/24 hr td ptwk (CATAPRES) 0.3 mg/24 hr, Place 1 patch onto the skin every 7 days., Disp: , Rfl:     clopidogreL (PLAVIX) 75 mg tablet, Take 75 mg by mouth once daily., Disp: , Rfl:     cyclobenzaprine (FLEXERIL) 10 MG tablet, Take 10 mg by mouth 3 (three) times daily., Disp: , Rfl:     diclofenac (VOLTAREN) 50 MG EC tablet, Take 1 tablet (50 mg total) by mouth 2 (two) times daily as needed (pain, after food)., Disp: 60 tablet, Rfl: 5    EPINEPHrine (EPIPEN) 0.3 mg/0.3 mL AtIn, Inject 0.3 mLs (0.3 mg total) into the muscle as needed (allergic reaction)., Disp: 1 each, Rfl: 1    EScitalopram oxalate (LEXAPRO) 20 MG tablet, Take 0.5  tablets (10 mg total) by mouth once daily. After breakfast, Disp: 30 tablet, Rfl: 5    folic acid (FOLVITE) 1 MG tablet, Take 1 tablet (1 mg total) by mouth once daily. After food, Disp: 30 tablet, Rfl: 5    hydrALAZINE (APRESOLINE) 10 MG tablet, Take 10 mg by mouth 3 (three) times daily., Disp: , Rfl:     hydroCHLOROthiazide (HYDRODIURIL) 12.5 MG Tab, Take 12.5 mg by mouth once daily., Disp: , Rfl:     HYDROcodone-acetaminophen (NORCO)  mg per tablet, Take 1 tablet by mouth every 8 (eight) hours as needed (Greater than 7 days.)., Disp: 90 tablet, Rfl: 0    hydrOXYchloroQUINE (PLAQUENIL) 200 mg tablet, Take 1 tablet (200 mg total) by mouth 2 (two) times daily., Disp: 60 tablet, Rfl: 5    ibuprofen (ADVIL,MOTRIN) 800 MG tablet, Take 1 tablet (800 mg total) by mouth every 6 (six) hours as needed for Pain., Disp: 30 tablet, Rfl: 1    levETIRAcetam (KEPPRA) 500 MG Tab, Take 1 tablet (500 mg total) by mouth 2 (two) times daily., Disp: 60 tablet, Rfl: 11    lisinopriL-hydrochlorothiazide (PRINZIDE,ZESTORETIC) 20-12.5 mg per tablet, Take 2 tablets by mouth nightly., Disp: , Rfl:     losartan (COZAAR) 50 MG tablet, Take 50 mg by mouth once daily., Disp: , Rfl:     methotrexate 2.5 MG Tab, Take 4 tablets (10 mg total) by mouth every 7 days. EVERY        TAKE 4 TAB TOGETHER AFTER SUPPER, Disp: 20 tablet, Rfl: 5    mupirocin (BACTROBAN) 2 % ointment, Apply topically 3 (three) times daily., Disp: 30 g, Rfl: 0    nicotine (NICODERM CQ) 21 mg/24 hr, Place 1 patch onto the skin once daily., Disp: , Rfl:     OLANZapine (ZYPREXA) 5 MG tablet, Take 5 mg by mouth nightly., Disp: , Rfl:     omeprazole (PRILOSEC) 40 MG capsule, Take 1 capsule (40 mg total) by mouth once daily. Before lunch, Disp: 30 capsule, Rfl: 5    pregabalin (LYRICA) 100 MG capsule, Take 1 capsule (100 mg total) by mouth 3 (three) times daily., Disp: 90 capsule, Rfl: 5    QUEtiapine (SEROQUEL) 300 MG Tab, Take 300 mg by mouth nightly., Disp: , Rfl:      risperiDONE (RISPERDAL M-TABS) 1 MG TbDL, Take 1 mg by mouth 2 (two) times daily., Disp: , Rfl:     temazepam (RESTORIL) 15 mg Cap, Take 1 capsule (15 mg total) by mouth every evening., Disp: 30 capsule, Rfl: 5    traMADoL (ULTRAM) 50 mg tablet, Take 1 tablet (50 mg total) by mouth every 6 (six) hours as needed for Pain., Disp: 90 tablet, Rfl: 5    zolpidem (AMBIEN) 10 mg Tab, Take 1 tablet (10 mg total) by mouth every evening., Disp: 30 tablet, Rfl: 5    aspirin (ECOTRIN) 81 MG EC tablet, Take 81 mg by mouth once daily., Disp: , Rfl:   No current facility-administered medications for this visit.    Facility-Administered Medications Ordered in Other Visits:     diazePAM tablet 5 mg, 5 mg, Oral, On Call Procedure, Hemant Maravilla DO, 5 mg at 12/02/22 1306    diphenhydrAMINE capsule 50 mg, 50 mg, Oral, On Call Procedure, Hemant Maravilla DO, 50 mg at 12/02/22 1305    sodium chloride 0.9% flush 10 mL, 10 mL, Intravenous, PRN, Hemant Maravilla DO  Allergies:   Review of patient's allergies indicates:   Allergen Reactions    Peanut Anaphylaxis     Social History:   Social History     Tobacco Use    Smoking status: Every Day     Packs/day: 0.50     Types: Cigarettes    Smokeless tobacco: Never    Tobacco comments:     Nicotine patches used    Substance Use Topics    Alcohol use: Yes     Comment: 1-2 times weekly    Drug use: Not Currently     Family History:   Family History   Problem Relation Age of Onset    Hypertension Mother     Hypertension Sister     Diabetes Sister     Hypertension Sister     Hypertension Sister     Hypertension Sister     Hypertension Brother     Hypertension Brother     Hypertension Brother     Diabetes Paternal Grandmother      Surgical History:   Past Surgical History:   Procedure Laterality Date    A-V CARDIAC PACEMAKER INSERTION N/A 12/2/2022    Procedure: INSERTION, CARDIAC PACEMAKER, DUAL CHAMBER;  Surgeon: Hemant Maravilla DO;  [facility] CATH LAB;  Service: Cardiology;  Laterality: N/A;   EXPLANT OF EVENT RECORDER /  IMPLANT OF DUAL CHAMBER PPM (SJM)    HIP SURGERY Left     Marito inserted from knee to hip    INSERTION OF IMPLANTABLE LOOP RECORDER  03/25/2022    Dr. Maravilla    INSERTION OF PERMANENT PACEMAKER  12/02/2022    Dr. Maravilla    REMOVAL OF IMPLANTABLE LOOP RECORDER  12/02/2022    Dr. Maravilla    THUMB SURGERY         Review of Systems:  Negative other than as stated in HPI    Objective:    Vitals:  Vitals:    12/20/22 0917   BP: 116/73   Pulse: 77   Temp: (!) 93.2 °F (34 °C)          Physical Exam:  Gen: NAD  Neuro: awake, alert, answering questions appropriately  Resp: non-labored breathing, FERN  Ext: moves all 4 spontaneously and purposefully  Skin: warm, well perfused. R buttock pedunculated skin lesion approx 2cm in diameter no other surrounding skin changes.  Anal exam: R lateral column hemorrhoidal tissue, nontender, no blood        Assessment/Plan:  57M with cardiac hx with pacemaker in place previous TIA on plavix w/ R buttock skin lesion   -will contact patients PCP for clearance to hold patient's plavix for 5 days  -plan to remove skin tag in office in 2 weeks if cleared  -carotid US ordered given hx of TIA with unclear workup (done at [facility] approx 1 year ago)        Fuentes Bruce  [employer] General Surgery, [facility]  12/20/2022 10:27 AM

## 2023-01-05 ENCOUNTER — OFFICE VISIT (OUTPATIENT)
Dept: SURGERY | Facility: CLINIC | Age: 58
End: 2023-01-05
Payer: MEDICAID

## 2023-01-05 VITALS
RESPIRATION RATE: 18 BRPM | WEIGHT: 243 LBS | TEMPERATURE: 98 F | SYSTOLIC BLOOD PRESSURE: 129 MMHG | HEART RATE: 70 BPM | OXYGEN SATURATION: 96 % | HEIGHT: 66 IN | BODY MASS INDEX: 39.05 KG/M2 | DIASTOLIC BLOOD PRESSURE: 88 MMHG

## 2023-01-05 DIAGNOSIS — D21.9: Primary | ICD-10-CM

## 2023-01-05 PROCEDURE — 88304 TISSUE EXAM BY PATHOLOGIST: CPT

## 2023-01-05 PROCEDURE — 99215 OFFICE O/P EST HI 40 MIN: CPT | Mod: PBBFAC

## 2023-01-05 RX ORDER — LEFLUNOMIDE 10 MG/1
10 TABLET ORAL
COMMUNITY
Start: 2022-03-03 | End: 2023-01-30

## 2023-01-05 RX ORDER — LIDOCAINE HYDROCHLORIDE 10 MG/ML
10 INJECTION, SOLUTION EPIDURAL; INFILTRATION; INTRACAUDAL; PERINEURAL ONCE
Status: COMPLETED | OUTPATIENT
Start: 2023-01-05 | End: 2023-01-05

## 2023-01-05 RX ORDER — BRIVARACETAM 100 MG/1
100 TABLET, FILM COATED ORAL 2 TIMES DAILY
COMMUNITY
Start: 2022-12-07

## 2023-01-05 RX ORDER — LAMOTRIGINE 100 MG/1
100 TABLET ORAL 2 TIMES DAILY
COMMUNITY
Start: 2023-01-05 | End: 2023-02-21 | Stop reason: ALTCHOICE

## 2023-01-05 RX ADMIN — LIDOCAINE HYDROCHLORIDE 100 MG: 10 INJECTION, SOLUTION EPIDURAL; INFILTRATION; INTRACAUDAL; PERINEURAL at 12:01

## 2023-01-05 NOTE — MEDICAL/APP STUDENT
[facility] General Surgery Clinic Note    HPI: Darius Restrepo is a 57 y.o. with history of TIA and sick sinus syndrome who presents with a right buttock skin lesion. Patient states it has been present for years and that it is problematic for him. Reports that region is intermittently red and has drainage on gauze that patient applies to region. Patient endorses recent loss of consciousness near [place] as well as orthopnea. Denies chest pain, SOB, leg pain, dizziness, or leg pain.   Patient reports being off of Plavix of greater than one month.    PMH:   Past Medical History:   Diagnosis Date    CVA (cerebral vascular accident)     Epilepsy     GERD (gastroesophageal reflux disease)     Hyperlipidemia     Hypertension     KRISTIN on CPAP     TIA (transient ischemic attack)       Meds:   Current Outpatient Medications:     ALPRAZolam (XANAX) 0.5 MG tablet, Take 1 tablet (0.5 mg total) by mouth 2 (two) times daily as needed for Anxiety., Disp: 60 tablet, Rfl: 5    amoxicillin-clavulanate 875-125mg (AUGMENTIN) 875-125 mg per tablet, Take 1 tablet by mouth 2 (two) times daily., Disp: 14 tablet, Rfl: 0    aspirin (ECOTRIN) 81 MG EC tablet, Take 81 mg by mouth once daily., Disp: , Rfl:     atorvastatin (LIPITOR) 20 MG tablet, Take 20 mg by mouth once daily., Disp: , Rfl:     cholecalciferol, vitamin D3, 1,250 mcg (50,000 unit) capsule, Take 1 capsule (50,000 Units total) by mouth every 7 days. (Patient taking differently: Take 50,000 Units by mouth every Monday.), Disp: 5 capsule, Rfl: 5    ciprofloxacin HCl (CIPRO) 500 MG tablet, Take 1 tablet (500 mg total) by mouth every 8 (eight) hours., Disp: 30 tablet, Rfl: 1    cloNIDine 0.3 mg/24 hr td ptwk (CATAPRES) 0.3 mg/24 hr, Place 1 patch onto the skin every 7 days., Disp: , Rfl:     clopidogreL (PLAVIX) 75 mg tablet, Take 75 mg by mouth once daily., Disp: , Rfl:     cyclobenzaprine (FLEXERIL) 10 MG tablet, Take 10 mg by mouth 3 (three) times daily., Disp: , Rfl:     diclofenac  (VOLTAREN) 50 MG EC tablet, Take 1 tablet (50 mg total) by mouth 2 (two) times daily as needed (pain, after food)., Disp: 60 tablet, Rfl: 5    EPINEPHrine (EPIPEN) 0.3 mg/0.3 mL AtIn, Inject 0.3 mLs (0.3 mg total) into the muscle as needed (allergic reaction)., Disp: 1 each, Rfl: 1    EScitalopram oxalate (LEXAPRO) 20 MG tablet, Take 0.5 tablets (10 mg total) by mouth once daily. After breakfast, Disp: 30 tablet, Rfl: 5    folic acid (FOLVITE) 1 MG tablet, Take 1 tablet (1 mg total) by mouth once daily. After food, Disp: 30 tablet, Rfl: 5    hydrALAZINE (APRESOLINE) 10 MG tablet, Take 10 mg by mouth 3 (three) times daily., Disp: , Rfl:     hydroCHLOROthiazide (HYDRODIURIL) 12.5 MG Tab, Take 12.5 mg by mouth once daily., Disp: , Rfl:     HYDROcodone-acetaminophen (NORCO)  mg per tablet, Take 1 tablet by mouth every 8 (eight) hours as needed (Greater than 7 days.)., Disp: 90 tablet, Rfl: 0    hydrOXYchloroQUINE (PLAQUENIL) 200 mg tablet, Take 1 tablet (200 mg total) by mouth 2 (two) times daily., Disp: 60 tablet, Rfl: 5    ibuprofen (ADVIL,MOTRIN) 800 MG tablet, Take 1 tablet (800 mg total) by mouth every 6 (six) hours as needed for Pain., Disp: 30 tablet, Rfl: 1    levETIRAcetam (KEPPRA) 500 MG Tab, Take 1 tablet (500 mg total) by mouth 2 (two) times daily., Disp: 60 tablet, Rfl: 11    lisinopriL-hydrochlorothiazide (PRINZIDE,ZESTORETIC) 20-12.5 mg per tablet, Take 2 tablets by mouth nightly., Disp: , Rfl:     losartan (COZAAR) 50 MG tablet, Take 50 mg by mouth once daily., Disp: , Rfl:     methotrexate 2.5 MG Tab, Take 4 tablets (10 mg total) by mouth every 7 days. EVERY        TAKE 4 TAB TOGETHER AFTER SUPPER, Disp: 20 tablet, Rfl: 5    mupirocin (BACTROBAN) 2 % ointment, Apply topically 3 (three) times daily., Disp: 30 g, Rfl: 0    nicotine (NICODERM CQ) 21 mg/24 hr, Place 1 patch onto the skin once daily., Disp: , Rfl:     OLANZapine (ZYPREXA) 5 MG tablet, Take 5 mg by mouth nightly., Disp: , Rfl:      omeprazole (PRILOSEC) 40 MG capsule, Take 1 capsule (40 mg total) by mouth once daily. Before lunch, Disp: 30 capsule, Rfl: 5    pregabalin (LYRICA) 100 MG capsule, Take 1 capsule (100 mg total) by mouth 3 (three) times daily., Disp: 90 capsule, Rfl: 5    QUEtiapine (SEROQUEL) 300 MG Tab, Take 300 mg by mouth nightly., Disp: , Rfl:     risperiDONE (RISPERDAL M-TABS) 1 MG TbDL, Take 1 mg by mouth 2 (two) times daily., Disp: , Rfl:     temazepam (RESTORIL) 15 mg Cap, Take 1 capsule (15 mg total) by mouth every evening., Disp: 30 capsule, Rfl: 5    traMADoL (ULTRAM) 50 mg tablet, Take 1 tablet (50 mg total) by mouth every 6 (six) hours as needed for Pain., Disp: 90 tablet, Rfl: 5    zolpidem (AMBIEN) 10 mg Tab, Take 1 tablet (10 mg total) by mouth every evening., Disp: 30 tablet, Rfl: 5  No current facility-administered medications for this visit.    Facility-Administered Medications Ordered in Other Visits:     diazePAM tablet 5 mg, 5 mg, Oral, On Call Procedure, Hemant Maravilla DO, 5 mg at 12/02/22 1306    diphenhydrAMINE capsule 50 mg, 50 mg, Oral, On Call Procedure, Hemant Maravilla DO, 50 mg at 12/02/22 1305    sodium chloride 0.9% flush 10 mL, 10 mL, Intravenous, PRN, Hemant Maravilla DO  Allergies:   Review of patient's allergies indicates:   Allergen Reactions    Peanut Anaphylaxis     Social History:   Tobacco: 0.5 ppd; hx of 15 year use  Alcohol: denies    Family History:   Family History   Problem Relation Age of Onset    Hypertension Mother     Hypertension Sister     Diabetes Sister     Hypertension Sister     Hypertension Sister     Hypertension Sister     Hypertension Brother     Hypertension Brother     Hypertension Brother     Diabetes Paternal Grandmother      Surgical History:   Past Surgical History:   Procedure Laterality Date    A-V CARDIAC PACEMAKER INSERTION N/A 12/2/2022    Procedure: INSERTION, CARDIAC PACEMAKER, DUAL CHAMBER;  Surgeon: Hemant Maravilla DO;  Location: [facility] CATH LAB;  Service:  Cardiology;  Laterality: N/A;  EXPLANT OF EVENT RECORDER /  IMPLANT OF DUAL CHAMBER PPM (SJM)    HIP SURGERY Left     Marito inserted from knee to hip    INSERTION OF IMPLANTABLE LOOP RECORDER  03/25/2022    Dr. Maravilla    INSERTION OF PERMANENT PACEMAKER  12/02/2022    Dr. Maravilla    REMOVAL OF IMPLANTABLE LOOP RECORDER  12/02/2022    Dr. Maravilla    THUMB SURGERY       Review of Systems:  Pertinent positives and negatives noted in HPI    Objective:    Vitals:  There were no vitals filed for this visit.       Physical Exam:  Gen: NAD  Neuro: awake, alert, answering questions appropriately  CV: regular rhythm  Resp: non-labored breathing; breath sounds clear at bases bilaterally  Abd: soft  Ext: no LE edema in bilateral ankles  Skin: approximately 1.5 x 3.0 cm pedunculated soft tissue mass on right buttock    Assessment/Plan:  Darius Restrepo is a 57 y.o. with history of TIA and sick sinus syndrome who presents for removal of a right buttock skin lesion.     -removal completed today  -follow up in clinic    PAWAN Ontiveros  01/05/2023 11:37 AM

## 2023-01-05 NOTE — PROGRESS NOTES
\[facility]\"" General Surgery Clinic Note     HPI: Darius Restrepo is a 57 y.o. with history of TIA and sick sinus syndrome who presents with a right buttock skin lesion. Patient states it has been present for years and that it is problematic for him. Reports that region is intermittently red and has drainage on gauze that patient applies to region. Patient endorses recent loss of consciousness near [place] as well as orthopnea. Denies chest pain, SOB, leg pain, dizziness, or leg pain.   Patient reports being off of Plavix of greater than one month.     PMH:        Past Medical History:   Diagnosis Date    CVA (cerebral vascular accident)      Epilepsy      GERD (gastroesophageal reflux disease)      Hyperlipidemia      Hypertension      KRISTIN on CPAP      TIA (transient ischemic attack)        Meds:   Current Outpatient Medications:     ALPRAZolam (XANAX) 0.5 MG tablet, Take 1 tablet (0.5 mg total) by mouth 2 (two) times daily as needed for Anxiety., Disp: 60 tablet, Rfl: 5    amoxicillin-clavulanate 875-125mg (AUGMENTIN) 875-125 mg per tablet, Take 1 tablet by mouth 2 (two) times daily., Disp: 14 tablet, Rfl: 0    aspirin (ECOTRIN) 81 MG EC tablet, Take 81 mg by mouth once daily., Disp: , Rfl:     atorvastatin (LIPITOR) 20 MG tablet, Take 20 mg by mouth once daily., Disp: , Rfl:     cholecalciferol, vitamin D3, 1,250 mcg (50,000 unit) capsule, Take 1 capsule (50,000 Units total) by mouth every 7 days. (Patient taking differently: Take 50,000 Units by mouth every Monday.), Disp: 5 capsule, Rfl: 5    ciprofloxacin HCl (CIPRO) 500 MG tablet, Take 1 tablet (500 mg total) by mouth every 8 (eight) hours., Disp: 30 tablet, Rfl: 1    cloNIDine 0.3 mg/24 hr td ptwk (CATAPRES) 0.3 mg/24 hr, Place 1 patch onto the skin every 7 days., Disp: , Rfl:     clopidogreL (PLAVIX) 75 mg tablet, Take 75 mg by mouth once daily., Disp: , Rfl:     cyclobenzaprine (FLEXERIL) 10 MG tablet, Take 10 mg by mouth 3 (three) times daily., Disp: , Rfl:      diclofenac (VOLTAREN) 50 MG EC tablet, Take 1 tablet (50 mg total) by mouth 2 (two) times daily as needed (pain, after food)., Disp: 60 tablet, Rfl: 5    EPINEPHrine (EPIPEN) 0.3 mg/0.3 mL AtIn, Inject 0.3 mLs (0.3 mg total) into the muscle as needed (allergic reaction)., Disp: 1 each, Rfl: 1    EScitalopram oxalate (LEXAPRO) 20 MG tablet, Take 0.5 tablets (10 mg total) by mouth once daily. After breakfast, Disp: 30 tablet, Rfl: 5    folic acid (FOLVITE) 1 MG tablet, Take 1 tablet (1 mg total) by mouth once daily. After food, Disp: 30 tablet, Rfl: 5    hydrALAZINE (APRESOLINE) 10 MG tablet, Take 10 mg by mouth 3 (three) times daily., Disp: , Rfl:     hydroCHLOROthiazide (HYDRODIURIL) 12.5 MG Tab, Take 12.5 mg by mouth once daily., Disp: , Rfl:     HYDROcodone-acetaminophen (NORCO)  mg per tablet, Take 1 tablet by mouth every 8 (eight) hours as needed (Greater than 7 days.)., Disp: 90 tablet, Rfl: 0    hydrOXYchloroQUINE (PLAQUENIL) 200 mg tablet, Take 1 tablet (200 mg total) by mouth 2 (two) times daily., Disp: 60 tablet, Rfl: 5    ibuprofen (ADVIL,MOTRIN) 800 MG tablet, Take 1 tablet (800 mg total) by mouth every 6 (six) hours as needed for Pain., Disp: 30 tablet, Rfl: 1    levETIRAcetam (KEPPRA) 500 MG Tab, Take 1 tablet (500 mg total) by mouth 2 (two) times daily., Disp: 60 tablet, Rfl: 11    lisinopriL-hydrochlorothiazide (PRINZIDE,ZESTORETIC) 20-12.5 mg per tablet, Take 2 tablets by mouth nightly., Disp: , Rfl:     losartan (COZAAR) 50 MG tablet, Take 50 mg by mouth once daily., Disp: , Rfl:     methotrexate 2.5 MG Tab, Take 4 tablets (10 mg total) by mouth every 7 days. EVERY        TAKE 4 TAB TOGETHER AFTER SUPPER, Disp: 20 tablet, Rfl: 5    mupirocin (BACTROBAN) 2 % ointment, Apply topically 3 (three) times daily., Disp: 30 g, Rfl: 0    nicotine (NICODERM CQ) 21 mg/24 hr, Place 1 patch onto the skin once daily., Disp: , Rfl:     OLANZapine (ZYPREXA) 5 MG tablet, Take 5 mg by mouth nightly., Disp: ,  Rfl:     omeprazole (PRILOSEC) 40 MG capsule, Take 1 capsule (40 mg total) by mouth once daily. Before lunch, Disp: 30 capsule, Rfl: 5    pregabalin (LYRICA) 100 MG capsule, Take 1 capsule (100 mg total) by mouth 3 (three) times daily., Disp: 90 capsule, Rfl: 5    QUEtiapine (SEROQUEL) 300 MG Tab, Take 300 mg by mouth nightly., Disp: , Rfl:     risperiDONE (RISPERDAL M-TABS) 1 MG TbDL, Take 1 mg by mouth 2 (two) times daily., Disp: , Rfl:     temazepam (RESTORIL) 15 mg Cap, Take 1 capsule (15 mg total) by mouth every evening., Disp: 30 capsule, Rfl: 5    traMADoL (ULTRAM) 50 mg tablet, Take 1 tablet (50 mg total) by mouth every 6 (six) hours as needed for Pain., Disp: 90 tablet, Rfl: 5    zolpidem (AMBIEN) 10 mg Tab, Take 1 tablet (10 mg total) by mouth every evening., Disp: 30 tablet, Rfl: 5  No current facility-administered medications for this visit.     Facility-Administered Medications Ordered in Other Visits:     diazePAM tablet 5 mg, 5 mg, Oral, On Call Procedure, Hemant Maravilla DO, 5 mg at 12/02/22 1306    diphenhydrAMINE capsule 50 mg, 50 mg, Oral, On Call Procedure, Hemant Maravilla DO, 50 mg at 12/02/22 1305    sodium chloride 0.9% flush 10 mL, 10 mL, Intravenous, PRN, Hemant Maravilla DO  Allergies:        Review of patient's allergies indicates:   Allergen Reactions    Peanut Anaphylaxis      Social History:   Tobacco: 0.5 ppd; hx of 15 year use  Alcohol: denies     Family History:         Family History   Problem Relation Age of Onset    Hypertension Mother      Hypertension Sister      Diabetes Sister      Hypertension Sister      Hypertension Sister      Hypertension Sister      Hypertension Brother      Hypertension Brother      Hypertension Brother      Diabetes Paternal Grandmother        Surgical History:         Past Surgical History:   Procedure Laterality Date    A-V CARDIAC PACEMAKER INSERTION N/A 12/2/2022     Procedure: INSERTION, CARDIAC PACEMAKER, DUAL CHAMBER;  Surgeon: Hemant Maravilla DO;   Location: [facility] CATH LAB;  Service: Cardiology;  Laterality: N/A;  EXPLANT OF EVENT RECORDER /  IMPLANT OF DUAL CHAMBER PPM (SJM)    HIP SURGERY Left       Marito inserted from knee to hip    INSERTION OF IMPLANTABLE LOOP RECORDER   03/25/2022     Dr. Maravilla    INSERTION OF PERMANENT PACEMAKER   12/02/2022     Dr. Maravilla    REMOVAL OF IMPLANTABLE LOOP RECORDER   12/02/2022     Dr. Maravilla    THUMB SURGERY          Review of Systems:  Pertinent positives and negatives noted in HPI     Objective:     Vitals:  There were no vitals filed for this visit.         Physical Exam:  Gen: NAD  Neuro: awake, alert, answering questions appropriately  CV: regular rhythm  Resp: non-labored breathing; breath sounds clear at bases bilaterally  Abd: soft  Ext: no LE edema in bilateral ankles  Skin: approximately 1.5 x 3.0 cm pedunculated soft tissue mass on right buttock     Assessment/Plan:  Darius Restrepo is a 57 y.o. with history of TIA and sick sinus syndrome who presents for removal of a right buttock skin lesion.      -removal completed today  -follow up in clinic     Reilly Hudson MSRobin  01/05/2023 11:37 AM      Resident Addendum  57 year old male with history of TIA, CVA, HLD, HTN, GERD, Epilepsy, KRISTIN, and recent cardiac sent placement. He presents to clinic with a right gluteal mass which he would like removed due to pain. The mass is pedunculated with a stalk, the stalk is about 0.5 cm in diameter and the pedicle is about 2.5 cm by 1.5 cm. Plan is to remove the mass in clinic today, risk, benefits alternatives discussed with patient, consent was obtained.    The affected area was prepped and draped in a sterile fashion. The area was numbed with lidocaine. An elliptical incision was made around the stalk with a 15 blade, the specimen was placed in a specimen cup and sent to pathology for evaluation. Point pressure was held to archive hemostasis. A U-stitch was made using a vicryl suture and complete hemostasis was archived. The  incision was dressed with gauze and tape. The patient tolerated the procedure well.    Pt to return to clinic in 1 week to evaluate the area. Pt was explained he begins to have significant bleeding, redness, pain, or purulent drainage from the incision, he should inform us.  Will follow up with pathology for Right buttock soft tissue mass.    Beata Magana MD  [facility] General Surgery PGY-1

## 2023-01-05 NOTE — PROGRESS NOTES
Seen by Dr. Garsia and Dr. Bruce.  Consent signed.  Time out performed. Assisted with procedure.  Tolerated well.  RTC 1 week.

## 2023-01-05 NOTE — PROGRESS NOTES
I have reviewed the notes, assessments, and/or procedures performed by the resident, I concur with her/his documentation of Darius Restrepo.     Batsheva Onofre MD     S/p excision of soft tissue lesion. Return to clinic in 1 week.

## 2023-01-06 LAB
ESTROGEN SERPL-MCNC: NORMAL PG/ML
INSULIN SERPL-ACNC: NORMAL U[IU]/ML
LAB AP CLINICAL INFORMATION: NORMAL
LAB AP GROSS DESCRIPTION: NORMAL
LAB AP REPORT FOOTNOTES: NORMAL
T3RU NFR SERPL: NORMAL %

## 2023-01-09 DIAGNOSIS — F51.01 PRIMARY INSOMNIA: ICD-10-CM

## 2023-01-09 DIAGNOSIS — I10 PRIMARY HYPERTENSION: ICD-10-CM

## 2023-01-09 DIAGNOSIS — M75.100 TEAR OF ROTATOR CUFF, UNSPECIFIED LATERALITY, UNSPECIFIED TEAR EXTENT, UNSPECIFIED WHETHER TRAUMATIC: ICD-10-CM

## 2023-01-09 DIAGNOSIS — M79.7 FIBROMYALGIA: ICD-10-CM

## 2023-01-09 DIAGNOSIS — F32.A DEPRESSIVE DISORDER: ICD-10-CM

## 2023-01-09 DIAGNOSIS — M35.9 UNDIFFERENTIATED CONNECTIVE TISSUE DISEASE: ICD-10-CM

## 2023-01-09 RX ORDER — HYDROCODONE BITARTRATE AND ACETAMINOPHEN 10; 325 MG/1; MG/1
1 TABLET ORAL EVERY 8 HOURS PRN
Qty: 90 TABLET | Refills: 0 | OUTPATIENT
Start: 2023-01-09 | End: 2023-01-22

## 2023-01-09 NOTE — TELEPHONE ENCOUNTER
----- Message from Regina Sepulveda sent at 1/9/2023  2:28 PM CST -----  Regarding: medication refill  Patient came in requesting refill for [place].

## 2023-01-12 ENCOUNTER — OFFICE VISIT (OUTPATIENT)
Dept: SURGERY | Facility: CLINIC | Age: 58
End: 2023-01-12
Payer: MEDICAID

## 2023-01-12 VITALS
HEART RATE: 91 BPM | BODY MASS INDEX: 39.21 KG/M2 | HEIGHT: 66 IN | DIASTOLIC BLOOD PRESSURE: 85 MMHG | SYSTOLIC BLOOD PRESSURE: 139 MMHG | RESPIRATION RATE: 18 BRPM | OXYGEN SATURATION: 98 % | WEIGHT: 244 LBS

## 2023-01-12 DIAGNOSIS — M79.89 SOFT TISSUE MASS: Primary | ICD-10-CM

## 2023-01-12 PROCEDURE — 99215 OFFICE O/P EST HI 40 MIN: CPT | Mod: PBBFAC

## 2023-01-12 NOTE — PROGRESS NOTES
[facility] General Surgery Clinic Note    HPI: Darius Restrepo is a 57 y.o. s/p excision of a benign neoplasm in the right buttocks region p/o 1 week. 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 Patients wound is healing well. Pain is 3/10 and well controlled with medication, notes no discharge or bleeding from the area.  No recent fevers, chills, nausea, vomiting.     PMH:   Past Medical History:   Diagnosis Date    CVA (cerebral vascular accident)     Epilepsy     GERD (gastroesophageal reflux disease)     Hyperlipidemia     Hypertension     KRISTIN on CPAP     TIA (transient ischemic attack)       Meds:   Current Outpatient Medications:     ALPRAZolam (XANAX) 0.5 MG tablet, Take 1 tablet (0.5 mg total) by mouth 2 (two) times daily as needed for Anxiety., Disp: 60 tablet, Rfl: 5    amoxicillin-clavulanate 875-125mg (AUGMENTIN) 875-125 mg per tablet, Take 1 tablet by mouth 2 (two) times daily., Disp: 14 tablet, Rfl: 0    aspirin (ECOTRIN) 81 MG EC tablet, Take 81 mg by mouth once daily., Disp: , Rfl:     atorvastatin (LIPITOR) 20 MG tablet, Take 20 mg by mouth once daily., Disp: , Rfl:     BRIVIACT 100 mg Tab, Take 100 mg by mouth 2 (two) times daily., Disp: , Rfl:     cholecalciferol, vitamin D3, 1,250 mcg (50,000 unit) capsule, Take 1 capsule (50,000 Units total) by mouth every 7 days. (Patient taking differently: Take 50,000 Units by mouth every Monday.), Disp: 5 capsule, Rfl: 5    ciprofloxacin HCl (CIPRO) 500 MG tablet, Take 1 tablet (500 mg total) by mouth every 8 (eight) hours., Disp: 30 tablet, Rfl: 1    cloNIDine 0.3 mg/24 hr td ptwk (CATAPRES) 0.3 mg/24 hr, Place 1 patch onto the skin every 7 days., Disp: , Rfl:     clopidogreL (PLAVIX) 75 mg tablet, Take 75 mg by mouth once daily., Disp: , Rfl:     cyclobenzaprine (FLEXERIL) 10 MG tablet, Take 10 mg by mouth 3 (three) times daily., Disp: , Rfl:     diclofenac (VOLTAREN) 50 MG EC tablet, Take 1 tablet (50 mg total) by mouth 2  (two) times daily as needed (pain, after food). (Patient not taking: Reported on 1/5/2023), Disp: 60 tablet, Rfl: 5    EPINEPHrine (EPIPEN) 0.3 mg/0.3 mL AtIn, Inject 0.3 mLs (0.3 mg total) into the muscle as needed (allergic reaction)., Disp: 1 each, Rfl: 1    EScitalopram oxalate (LEXAPRO) 20 MG tablet, Take 0.5 tablets (10 mg total) by mouth once daily. After breakfast, Disp: 30 tablet, Rfl: 5    folic acid (FOLVITE) 1 MG tablet, Take 1 tablet (1 mg total) by mouth once daily. After food, Disp: 30 tablet, Rfl: 5    hydrALAZINE (APRESOLINE) 10 MG tablet, Take 10 mg by mouth 3 (three) times daily., Disp: , Rfl:     hydroCHLOROthiazide (HYDRODIURIL) 12.5 MG Tab, Take 12.5 mg by mouth once daily., Disp: , Rfl:     HYDROcodone-acetaminophen (NORCO)  mg per tablet, Take 1 tablet by mouth every 8 (eight) hours as needed (Greater than 7 days.)., Disp: 90 tablet, Rfl: 0    hydrOXYchloroQUINE (PLAQUENIL) 200 mg tablet, Take 1 tablet (200 mg total) by mouth 2 (two) times daily., Disp: 60 tablet, Rfl: 5    ibuprofen (ADVIL,MOTRIN) 800 MG tablet, Take 1 tablet (800 mg total) by mouth every 6 (six) hours as needed for Pain., Disp: 30 tablet, Rfl: 1    lamoTRIgine (LAMICTAL) 100 MG tablet, Take 100 mg by mouth 2 (two) times daily., Disp: , Rfl:     leflunomide (ARAVA) 10 MG Tab, Take 10 mg by mouth., Disp: , Rfl:     levETIRAcetam (KEPPRA) 500 MG Tab, Take 1 tablet (500 mg total) by mouth 2 (two) times daily., Disp: 60 tablet, Rfl: 11    lisinopriL-hydrochlorothiazide (PRINZIDE,ZESTORETIC) 20-12.5 mg per tablet, Take 2 tablets by mouth nightly., Disp: , Rfl:     losartan (COZAAR) 50 MG tablet, Take 50 mg by mouth once daily., Disp: , Rfl:     methotrexate 2.5 MG Tab, Take 4 tablets (10 mg total) by mouth every 7 days. EVERY        TAKE 4 TAB TOGETHER AFTER SUPPER, Disp: 20 tablet, Rfl: 5    mupirocin (BACTROBAN) 2 % ointment, Apply topically 3 (three) times daily., Disp: 30 g, Rfl: 0    nicotine (NICODERM CQ) 21 mg/24 hr,  Place 1 patch onto the skin once daily., Disp: , Rfl:     OLANZapine (ZYPREXA) 5 MG tablet, Take 5 mg by mouth nightly., Disp: , Rfl:     omeprazole (PRILOSEC) 40 MG capsule, Take 1 capsule (40 mg total) by mouth once daily. Before lunch, Disp: 30 capsule, Rfl: 5    pregabalin (LYRICA) 100 MG capsule, Take 1 capsule (100 mg total) by mouth 3 (three) times daily., Disp: 90 capsule, Rfl: 5    QUEtiapine (SEROQUEL) 300 MG Tab, Take 300 mg by mouth nightly., Disp: , Rfl:     risperiDONE (RISPERDAL M-TABS) 1 MG TbDL, Take 1 mg by mouth 2 (two) times daily., Disp: , Rfl:     temazepam (RESTORIL) 15 mg Cap, Take 1 capsule (15 mg total) by mouth every evening., Disp: 30 capsule, Rfl: 5    traMADoL (ULTRAM) 50 mg tablet, Take 1 tablet (50 mg total) by mouth every 6 (six) hours as needed for Pain., Disp: 90 tablet, Rfl: 5    zolpidem (AMBIEN) 10 mg Tab, Take 1 tablet (10 mg total) by mouth every evening., Disp: 30 tablet, Rfl: 5  No current facility-administered medications for this visit.    Facility-Administered Medications Ordered in Other Visits:     diazePAM tablet 5 mg, 5 mg, Oral, On Call Procedure, Hemant Maravilla DO, 5 mg at 12/02/22 1306    diphenhydrAMINE capsule 50 mg, 50 mg, Oral, On Call Procedure, Hemant Maravilla DO, 50 mg at 12/02/22 1305    sodium chloride 0.9% flush 10 mL, 10 mL, Intravenous, PRN, Hemant Maravilla DO  Allergies:   Review of patient's allergies indicates:   Allergen Reactions    Peanut Anaphylaxis     Social History:   Social History     Tobacco Use    Smoking status: Every Day     Packs/day: 0.50     Types: Cigarettes    Smokeless tobacco: Never    Tobacco comments:     Nicotine patches used    Substance Use Topics    Alcohol use: Yes     Comment: 1-2 times weekly    Drug use: Not Currently     Family History:   Family History   Problem Relation Age of Onset    Hypertension Mother     Hypertension Sister     Diabetes Sister     Hypertension Sister     Hypertension Sister     Hypertension Sister      Hypertension Brother     Hypertension Brother     Hypertension Brother     Diabetes Paternal Grandmother      Surgical History:   Past Surgical History:   Procedure Laterality Date    A-V CARDIAC PACEMAKER INSERTION N/A 12/2/2022    Procedure: INSERTION, CARDIAC PACEMAKER, DUAL CHAMBER;  Surgeon: Hemant Maravilla DO;  Location: [facility] CATH LAB;  Service: Cardiology;  Laterality: N/A;  EXPLANT OF EVENT RECORDER /  IMPLANT OF DUAL CHAMBER PPM (SJM)    HIP SURGERY Left     Marito inserted from knee to hip    INSERTION OF IMPLANTABLE LOOP RECORDER  03/25/2022    Dr. Maravilla    INSERTION OF PERMANENT PACEMAKER  12/02/2022    Dr. Maravilla    REMOVAL OF IMPLANTABLE LOOP RECORDER  12/02/2022    Dr. Maravilla    THUMB SURGERY       Review of Systems:  10 point review of systems denied unless otherwise indicated above HPI    Objective:    Vitals:  There were no vitals filed for this visit.         Physical Exam:  Gen: NAD  Neuro: awake, alert, answering questions appropriately  Resp: non-labored breathing  Skin: warm, well perfused, healing patch of skin from excision on right lower buttock lateral to the gluteal folds, some dry skin without bleeding or secretions.     Imaging:  N/A     Micro/Path/Other:  Right buttock soft tissue mass, excision:  - Fibroepithelial polyp.      Assessment/Plan:   Darius Restrepo is a 57 y.o. s/p excision of a benign neoplasm in the right buttocks region p/o 1 week healing well without complications.     -return precautions adressed, otherwise this condition is resolved.     Jose Alfredo Michael MS3   01/12/2023 11:33 AM     Patient seen and examined with above student.   Path reviewed--fibroepithelial polyp  Wound well-healed  Fu prn for buttock mass    Carotid US ordered given hx of cva, patient states he will obtain it tomorrow   Based on results we may have him come to vascular clinic    Alen Bruce MD  [facility] General Surgery PGY5      Alen Bruce MD  [facility] General Surgery PGY5

## 2023-01-12 NOTE — PROGRESS NOTES
Pt seen by Dr. Bruce. RTC PRN. Carotid US orders given by provider. Based on results pt may be seen in vascular clinic. PT education given both written and verbal.

## 2023-01-21 ENCOUNTER — HOSPITAL ENCOUNTER (EMERGENCY)
Facility: HOSPITAL | Age: 58
Discharge: HOME | End: 2023-01-22
Attending: FAMILY MEDICINE
Payer: MEDICAID

## 2023-01-21 DIAGNOSIS — R56.9 SEIZURE: Primary | ICD-10-CM

## 2023-01-21 DIAGNOSIS — R07.9 CHEST PAIN: ICD-10-CM

## 2023-01-21 DIAGNOSIS — S09.93XA INJURY OF TOOTH, INITIAL ENCOUNTER: ICD-10-CM

## 2023-01-21 LAB
ALBUMIN SERPL-MCNC: 3.9 G/DL (ref 3.5–5)
ALBUMIN/GLOB SERPL: 1.1 RATIO (ref 1.1–2)
ALP SERPL-CCNC: 72 UNIT/L (ref 40–150)
ALT SERPL-CCNC: 21 UNIT/L (ref 0–55)
AST SERPL-CCNC: 16 UNIT/L (ref 5–34)
BASOPHILS # BLD AUTO: 0.02 X10(3)/MCL (ref 0–0.2)
BASOPHILS NFR BLD AUTO: 0.3 %
BILIRUBIN DIRECT+TOT PNL SERPL-MCNC: 0.3 MG/DL
BUN SERPL-MCNC: 11 MG/DL (ref 8.4–25.7)
CALCIUM SERPL-MCNC: 9.5 MG/DL (ref 8.4–10.2)
CHLORIDE SERPL-SCNC: 102 MMOL/L (ref 98–107)
CO2 SERPL-SCNC: 23 MMOL/L (ref 22–29)
CREAT SERPL-MCNC: 0.91 MG/DL (ref 0.73–1.18)
EOSINOPHIL # BLD AUTO: 0.23 X10(3)/MCL (ref 0–0.9)
EOSINOPHIL NFR BLD AUTO: 3.9 %
ERYTHROCYTE [DISTWIDTH] IN BLOOD BY AUTOMATED COUNT: 14.5 % (ref 11.5–17)
GFR SERPLBLD CREATININE-BSD FMLA CKD-EPI: >60 MLS/MIN/1.73/M2
GLOBULIN SER-MCNC: 3.6 GM/DL (ref 2.4–3.5)
GLUCOSE SERPL-MCNC: 118 MG/DL (ref 74–100)
HCT VFR BLD AUTO: 46.1 % (ref 42–52)
HGB BLD-MCNC: 14.5 GM/DL (ref 14–18)
IMM GRANULOCYTES # BLD AUTO: 0.01 X10(3)/MCL (ref 0–0.04)
IMM GRANULOCYTES NFR BLD AUTO: 0.2 %
LYMPHOCYTES # BLD AUTO: 1.86 X10(3)/MCL (ref 0.6–4.6)
LYMPHOCYTES NFR BLD AUTO: 31.4 %
MCH RBC QN AUTO: 26.5 PG
MCHC RBC AUTO-ENTMCNC: 31.5 MG/DL (ref 33–36)
MCV RBC AUTO: 84.3 FL (ref 80–94)
MONOCYTES # BLD AUTO: 0.51 X10(3)/MCL (ref 0.1–1.3)
MONOCYTES NFR BLD AUTO: 8.6 %
NEUTROPHILS # BLD AUTO: 3.3 X10(3)/MCL (ref 2.1–9.2)
NEUTROPHILS NFR BLD AUTO: 55.6 %
PLATELET # BLD AUTO: 234 X10(3)/MCL (ref 130–400)
PMV BLD AUTO: 10.1 FL (ref 7.4–10.4)
POTASSIUM SERPL-SCNC: 3.9 MMOL/L (ref 3.5–5.1)
PROT SERPL-MCNC: 7.5 GM/DL (ref 6.4–8.3)
RBC # BLD AUTO: 5.47 X10(6)/MCL (ref 4.7–6.1)
SODIUM SERPL-SCNC: 137 MMOL/L (ref 136–145)
WBC # SPEC AUTO: 5.9 X10(3)/MCL (ref 4.5–11.5)

## 2023-01-21 PROCEDURE — 93005 ELECTROCARDIOGRAM TRACING: CPT

## 2023-01-21 PROCEDURE — 93010 ELECTROCARDIOGRAM REPORT: CPT | Mod: ,,, | Performed by: STUDENT IN AN ORGANIZED HEALTH CARE EDUCATION/TRAINING PROGRAM

## 2023-01-21 PROCEDURE — 63600175 PHARM REV CODE 636 W HCPCS: Performed by: FAMILY MEDICINE

## 2023-01-21 PROCEDURE — 84484 ASSAY OF TROPONIN QUANT: CPT

## 2023-01-21 PROCEDURE — 99285 EMERGENCY DEPT VISIT HI MDM: CPT | Mod: 25

## 2023-01-21 PROCEDURE — 0240U COVID/FLU A&B PCR: CPT | Performed by: FAMILY MEDICINE

## 2023-01-21 PROCEDURE — 80175 DRUG SCREEN QUAN LAMOTRIGINE: CPT | Performed by: FAMILY MEDICINE

## 2023-01-21 PROCEDURE — 80053 COMPREHEN METABOLIC PANEL: CPT | Performed by: FAMILY MEDICINE

## 2023-01-21 PROCEDURE — 96374 THER/PROPH/DIAG INJ IV PUSH: CPT

## 2023-01-21 PROCEDURE — 85025 COMPLETE CBC W/AUTO DIFF WBC: CPT | Performed by: FAMILY MEDICINE

## 2023-01-21 PROCEDURE — 93010 EKG 12-LEAD: ICD-10-PCS | Mod: ,,, | Performed by: STUDENT IN AN ORGANIZED HEALTH CARE EDUCATION/TRAINING PROGRAM

## 2023-01-21 RX ORDER — LORAZEPAM 2 MG/ML
1 INJECTION INTRAMUSCULAR
Status: COMPLETED | OUTPATIENT
Start: 2023-01-21 | End: 2023-01-21

## 2023-01-21 RX ADMIN — LORAZEPAM 1 MG: 2 INJECTION INTRAMUSCULAR; INTRAVENOUS at 11:01

## 2023-01-22 VITALS
HEART RATE: 79 BPM | WEIGHT: 245 LBS | RESPIRATION RATE: 24 BRPM | DIASTOLIC BLOOD PRESSURE: 92 MMHG | BODY MASS INDEX: 38.45 KG/M2 | SYSTOLIC BLOOD PRESSURE: 153 MMHG | TEMPERATURE: 98 F | OXYGEN SATURATION: 95 % | HEIGHT: 67 IN

## 2023-01-22 LAB
FLUAV AG UPPER RESP QL IA.RAPID: NOT DETECTED
FLUBV AG UPPER RESP QL IA.RAPID: NOT DETECTED
POC CARDIAC TROPONIN I: 0 NG/ML (ref 0–0.08)
SAMPLE: NORMAL
SARS-COV-2 RNA RESP QL NAA+PROBE: NOT DETECTED

## 2023-01-22 PROCEDURE — 96375 TX/PRO/DX INJ NEW DRUG ADDON: CPT

## 2023-01-22 PROCEDURE — 63600175 PHARM REV CODE 636 W HCPCS: Mod: TB,JG | Performed by: FAMILY MEDICINE

## 2023-01-22 PROCEDURE — 80177 DRUG SCRN QUAN LEVETIRACETAM: CPT | Performed by: FAMILY MEDICINE

## 2023-01-22 RX ORDER — HYDROCODONE BITARTRATE AND ACETAMINOPHEN 5; 325 MG/1; MG/1
1 TABLET ORAL EVERY 6 HOURS PRN
Qty: 12 TABLET | Refills: 0 | Status: SHIPPED | OUTPATIENT
Start: 2023-01-22 | End: 2023-01-30

## 2023-01-22 RX ORDER — MORPHINE SULFATE 4 MG/ML
4 INJECTION, SOLUTION INTRAMUSCULAR; INTRAVENOUS
Status: COMPLETED | OUTPATIENT
Start: 2023-01-22 | End: 2023-01-22

## 2023-01-22 RX ADMIN — MORPHINE SULFATE 4 MG: 4 INJECTION, SOLUTION INTRAMUSCULAR; INTRAVENOUS at 12:01

## 2023-01-22 NOTE — ED PROVIDER NOTES
Encounter Date: 1/21/2023       History     Chief Complaint   Patient presents with    Seizures     Pt brought in by [facility] from home with reports of a witnessed 2-3 min seizure. Pt has abrasions to the center of face and dental injuries from a fall while having seizure. Pt has a hx of seizure.     Seizure 58-year-old male patient who recently had a seizure at home patient does have a history of seizure disorder chronic condition of epilepsy contributed to this episode otherwise patient has been taking his medicine he is on Lamictal Keppra for seizures.  Patient has damage to his upper or lower teeth also abrasions on his nose and forehead and headache.      Review of patient's allergies indicates:   Allergen Reactions    Peanut Anaphylaxis     Past Medical History:   Diagnosis Date    CVA (cerebral vascular accident)     Epilepsy     GERD (gastroesophageal reflux disease)     Hyperlipidemia     Hypertension     KRISTIN on CPAP     TIA (transient ischemic attack)      Past Surgical History:   Procedure Laterality Date    A-V CARDIAC PACEMAKER INSERTION N/A 12/2/2022    Procedure: INSERTION, CARDIAC PACEMAKER, DUAL CHAMBER;  Surgeon: Hemant Maravilla DO;  Location: [facility] CATH LAB;  Service: Cardiology;  Laterality: N/A;  EXPLANT OF EVENT RECORDER /  IMPLANT OF DUAL CHAMBER PPM (SJM)    HIP SURGERY Left     Marito inserted from knee to hip    INSERTION OF IMPLANTABLE LOOP RECORDER  03/25/2022    Dr. Maravilla    INSERTION OF PERMANENT PACEMAKER  12/02/2022    Dr. Maravilla    REMOVAL OF IMPLANTABLE LOOP RECORDER  12/02/2022    Dr. Maravilla    THUMB SURGERY       Family History   Problem Relation Age of Onset    Hypertension Mother     Hypertension Sister     Diabetes Sister     Hypertension Sister     Hypertension Sister     Hypertension Sister     Hypertension Brother     Hypertension Brother     Hypertension Brother     Diabetes Paternal Grandmother      Social History     Tobacco Use    Smoking status: Every Day     Packs/day: 0.50      Types: Cigarettes    Smokeless tobacco: Never    Tobacco comments:     Nicotine patches used    Substance Use Topics    Alcohol use: Yes     Comment: 1-2 times weekly    Drug use: Not Currently     Review of Systems   Constitutional:  Negative for fever.   HENT:  Positive for dental problem. Negative for sore throat.    Respiratory:  Negative for shortness of breath.    Cardiovascular:  Negative for chest pain.   Gastrointestinal:  Negative for nausea.   Genitourinary:  Negative for dysuria.   Musculoskeletal:  Negative for back pain.   Skin:  Positive for wound. Negative for rash.   Neurological:  Positive for headaches. Negative for weakness.   Hematological:  Does not bruise/bleed easily.     Physical Exam     Initial Vitals [01/21/23 2309]   BP Pulse Resp Temp SpO2   (!) 137/96 83 (!) 24 98.2 °F (36.8 °C) 95 %      MAP       --         Physical Exam    Nursing note and vitals reviewed.  Constitutional: He appears well-developed and well-nourished. He is not diaphoretic. No distress.   HENT:   Head: Normocephalic and atraumatic.   Right Ear: External ear normal.   Left Ear: External ear normal.   Nose: Nose normal.   Mouth/Throat: Oropharynx is clear and moist. No oropharyngeal exudate.   Patient has damage to upper and lower incisors   Eyes: Conjunctivae and EOM are normal. Pupils are equal, round, and reactive to light. Right eye exhibits no discharge. Left eye exhibits no discharge.   Neck: Neck supple. No thyromegaly present. No tracheal deviation present. No JVD present.   Normal range of motion.  Cardiovascular:  Normal rate, regular rhythm, normal heart sounds and intact distal pulses.     Exam reveals no gallop and no friction rub.       No murmur heard.  Pulmonary/Chest: Breath sounds normal. No stridor. No respiratory distress. He has no wheezes. He has no rhonchi. He has no rales. He exhibits no tenderness.   Abdominal: Abdomen is soft. Bowel sounds are normal. He exhibits no distension. There is no  abdominal tenderness. There is no rebound and no guarding.   Musculoskeletal:         General: No tenderness or edema. Normal range of motion.      Cervical back: Normal range of motion and neck supple.     Lymphadenopathy:     He has no cervical adenopathy.   Neurological: He is alert and oriented to person, place, and time. He has normal strength and normal reflexes. No cranial nerve deficit or sensory deficit. GCS score is 15. GCS eye subscore is 4. GCS verbal subscore is 5. GCS motor subscore is 6.   Skin: Skin is warm and dry. No rash and no abscess noted. No erythema.   Abrasions the forehead nose and upper lip   Psychiatric: He has a normal mood and affect. His behavior is normal. Judgment and thought content normal.       ED Course   Procedures  Labs Reviewed   COMPREHENSIVE METABOLIC PANEL - Abnormal; Notable for the following components:       Result Value    Glucose Level 118 (*)     Globulin 3.6 (*)     All other components within normal limits   CBC WITH DIFFERENTIAL - Abnormal; Notable for the following components:    MCHC 31.5 (*)     All other components within normal limits   COVID/FLU A&B PCR - Normal    Narrative:     The Xpert Xpress SARS-CoV-2/FLU/RSV plus is a rapid, multiplexed real-time PCR test intended for the simultaneous qualitative detection and differentiation of SARS-CoV-2, Influenza A, Influenza B, and respiratory syncytial virus (RSV) viral RNA in either nasopharyngeal swab or nasal swab specimens.         CBC W/ AUTO DIFFERENTIAL    Narrative:     The following orders were created for panel order CBC auto differential.  Procedure                               Abnormality         Status                     ---------                               -----------         ------                     CBC with Differential[[id number]]        Abnormal            Final result                 Please view results for these tests on the individual orders.   TROPONIN ISTAT   URINALYSIS, REFLEX TO  URINE CULTURE   LAMOTRIGINE LEVEL   LEVETIRACETAM  (KEPPRA) LEVEL   POCT TROPONIN          Imaging Results              CT Maxillofacial Without Contrast (Preliminary result)  Result time 01/22/23 00:47:09      Preliminary result by John Jay MD (01/22/23 00:47:09)                   Narrative:    START OF REPORT:  Technique: Noncontrast maxillofacial CT was performed with axial as well as sagittal and coronal images being submitted for interpretation.    Comparison: None.    Clinical history: Seizures (Pt brought in by [facility] from home with reports of a witnessed 2-3 min seizure. Pt has abrasions to the center of face and dental injuries from a fall while having seizure. Pt has a hx of seizure.).    Findings:  Orbital soft tissues: The orbital soft tissues appear unremarkable.  preseptal soft tissues: The bilateral preseptal soft tissues appear unremarkable.  post- septal soft tissues: The bilateral extraconal soft tissues appear unremarkable. The bilateral intraconal soft tissues appear unremarkable.  Bones:  Orbital bony structures: The bilateral orbital bony structures are intact with no orbital fracture identified.  Mandible: The mandible appears unremarkable with no fracture identified. There is mild depression of right lower central incisor tooth. Correlate clinically.  Maxilla: The maxilla appears unremarkable with no fracture identified.  Pterygoid plates: No fracture identified of the right or left pterygoid plates.  Zygoma: The zygomatic arches are intact.  TMJ: The mandibular condyles appear normally placed with respect to the mandibular fossa.  Nasal Bones: Mildly displaced bilateral nasal bone fractures of indeterminate age are seen.  Paranasal sinuses: The visualized paranasal sinuses appear clear with no significant mucoperiosteal thickening or air fluid levels identified. There are surgical implants along anterior wall of left maxillary sinus. Correlate clinically.  Mastoid air cells: The  visualized mastoid air cells appear clear.  Brain: Intracranial findings discussed separately.      Impression:  1. There is mild depression of right lower central incisor tooth. Correlate clinically.  2. Mildly displaced bilateral nasal bone fractures of indeterminate age are seen.  3. Details and other findings as noted above.                          Preliminary result by Interface, Rad Results In (01/22/23 00:47:09)                   Narrative:    START OF REPORT:  Technique: Noncontrast maxillofacial CT was performed with axial as well as sagittal and coronal images being submitted for interpretation.    Comparison: None.    Clinical history: Seizures (Pt brought in by [facility] from home with reports of a witnessed 2-3 min seizure. Pt has abrasions to the center of face and dental injuries from a fall while having seizure. Pt has a hx of seizure.).    Findings:  Orbital soft tissues: The orbital soft tissues appear unremarkable.  preseptal soft tissues: The bilateral preseptal soft tissues appear unremarkable.  post- septal soft tissues: The bilateral extraconal soft tissues appear unremarkable. The bilateral intraconal soft tissues appear unremarkable.  Bones:  Orbital bony structures: The bilateral orbital bony structures are intact with no orbital fracture identified.  Mandible: The mandible appears unremarkable with no fracture identified. There is mild depression of right lower central incisor tooth. Correlate clinically.  Maxilla: The maxilla appears unremarkable with no fracture identified.  Pterygoid plates: No fracture identified of the right or left pterygoid plates.  Zygoma: The zygomatic arches are intact.  TMJ: The mandibular condyles appear normally placed with respect to the mandibular fossa.  Nasal Bones: Mildly displaced bilateral nasal bone fractures of indeterminate age are seen.  Paranasal sinuses: The visualized paranasal sinuses appear clear with no significant mucoperiosteal thickening or air  fluid levels identified. There are surgical implants along anterior wall of left maxillary sinus. Correlate clinically.  Mastoid air cells: The visualized mastoid air cells appear clear.  Brain: Intracranial findings discussed separately.      Impression:  1. There is mild depression of right lower central incisor tooth. Correlate clinically.  2. Mildly displaced bilateral nasal bone fractures of indeterminate age are seen.  3. Details and other findings as noted above.                                         CT Head Without Contrast (Preliminary result)  Result time 01/22/23 00:45:24      Preliminary result by John Jay MD (01/22/23 00:45:24)                   Narrative:    START OF REPORT:  Technique: CT of the head was performed without intravenous contrast with axial as well as coronal and sagittal images.    Comparison: None.    Dosage Information: Automated Exposure Control was utilized 1516.48 mGy.cm.    Clinical history: Seizures (Pt brought in by [facility] from home with reports of a witnessed 2-3 min seizure. Pt has abrasions to the center of face and dental injuries from a fall while having seizure. Pt has a hx of seizure.).    Findings:  Hemorrhage: No acute intracranial hemorrhage is seen.  CSF spaces: The ventricles sulci and basal cisterns are within normal limits for age.  Brain parenchyma: Unremarkable with preservation of the grey white junction throughout.  Cerebellum: Unremarkable.  Sella and skull base: The sella appears to be within normal limits for age.  Intracranial calcifications: Incidental note is made of bilateral choroid plexus calcification. Incidental note is made of some pineal region calcification.  Calvarium: No acute linear or depressed skull fracture is seen.    Maxillofacial Structures: Maxillofacial findings discussed separately in the maxillofacial CT report.      Impression:  1. No acute intracranial traumatic injury identified. Details and findings as noted above.                           Preliminary result by Interface, Rad Results In (01/22/23 00:45:24)                   Narrative:    START OF REPORT:  Technique: CT of the head was performed without intravenous contrast with axial as well as coronal and sagittal images.    Comparison: None.    Dosage Information: Automated Exposure Control was utilized 1516.48 mGy.cm.    Clinical history: Seizures (Pt brought in by [facility] from home with reports of a witnessed 2-3 min seizure. Pt has abrasions to the center of face and dental injuries from a fall while having seizure. Pt has a hx of seizure.).    Findings:  Hemorrhage: No acute intracranial hemorrhage is seen.  CSF spaces: The ventricles sulci and basal cisterns are within normal limits for age.  Brain parenchyma: Unremarkable with preservation of the grey white junction throughout.  Cerebellum: Unremarkable.  Sella and skull base: The sella appears to be within normal limits for age.  Intracranial calcifications: Incidental note is made of bilateral choroid plexus calcification. Incidental note is made of some pineal region calcification.  Calvarium: No acute linear or depressed skull fracture is seen.    Maxillofacial Structures: Maxillofacial findings discussed separately in the maxillofacial CT report.      Impression:  1. No acute intracranial traumatic injury identified. Details and findings as noted above.                                         X-Ray Chest AP Portable (Preliminary result)  Result time 01/22/23 00:16:18      ED Interpretation by Trenton Kuhn MD (01/22/23 00:16:18, [facility] - Emergency Dept, Emergency Medicine)    Please see official radiology report otherwise ED interpretation is no acute process.                                       Medications   LORazepam injection 1 mg (1 mg Intravenous Given 1/21/23 9850)   morphine injection 4 mg (4 mg Intravenous Given 1/22/23 0056)     Medical Decision Making:   Differential Diagnosis:   Seizure breakthrough  seizure fall injury                        Clinical Impression:   Final diagnoses:  [R07.9] Chest pain  [R56.9] Seizure (Primary)  [S09.93XA] Injury of tooth, initial encounter        ED Disposition Condition    Discharge Stable          ED Prescriptions       Medication Sig Dispense Start Date End Date Auth. Provider    HYDROcodone-acetaminophen (NORCO) 5-325 mg per tablet Take 1 tablet by mouth every 6 (six) hours as needed for Pain. 12 tablet 1/22/2023 1/25/2023 Trenton Kuhn MD          Follow-up Information    None          Trenton Kuhn MD  01/22/23 0036       Trenton Kuhn MD  01/22/23 0113

## 2023-01-22 NOTE — ED NOTES
Patient found outside after walking the dog on the ground. Wife witnessed 2-3min seizure. Hx of seizures, last one on Thursday and takes kepra and trileptal. Patient slightly lethargic on arrival, oriented to situation, person, time, and place. Abrasions to Forehead, under nose, and nasal bridge. Cleaned with normal saline and covered with NS soaked guaze. Upper front tooth and lower tooth displaced. Lower tooth pulp visible. CO pain to lower tooth. Wife at bedside, explained treatment plan. Call be at bedside. No further questions at this time.

## 2023-01-24 LAB
LAMOTRIGINE SERPL-MCNC: <0.2 MCG/ML (ref 3–15)
LEVETIRACETAM SERPL-MCNC: <2 MCG/ML (ref 10–40)

## 2023-01-30 ENCOUNTER — OFFICE VISIT (OUTPATIENT)
Dept: RHEUMATOLOGY | Facility: CLINIC | Age: 58
End: 2023-01-30
Payer: MEDICAID

## 2023-01-30 VITALS
HEIGHT: 67 IN | BODY MASS INDEX: 40.62 KG/M2 | WEIGHT: 258.81 LBS | DIASTOLIC BLOOD PRESSURE: 110 MMHG | OXYGEN SATURATION: 98 % | TEMPERATURE: 99 F | SYSTOLIC BLOOD PRESSURE: 180 MMHG | HEART RATE: 65 BPM

## 2023-01-30 DIAGNOSIS — M75.100 TEAR OF ROTATOR CUFF, UNSPECIFIED LATERALITY, UNSPECIFIED TEAR EXTENT, UNSPECIFIED WHETHER TRAUMATIC: ICD-10-CM

## 2023-01-30 DIAGNOSIS — F32.A DEPRESSIVE DISORDER: ICD-10-CM

## 2023-01-30 DIAGNOSIS — F51.01 PRIMARY INSOMNIA: ICD-10-CM

## 2023-01-30 DIAGNOSIS — M79.7 FIBROMYALGIA: ICD-10-CM

## 2023-01-30 DIAGNOSIS — M35.9 UNDIFFERENTIATED CONNECTIVE TISSUE DISEASE: Primary | ICD-10-CM

## 2023-01-30 DIAGNOSIS — I10 PRIMARY HYPERTENSION: ICD-10-CM

## 2023-01-30 DIAGNOSIS — M75.101 TEAR OF RIGHT ROTATOR CUFF, UNSPECIFIED TEAR EXTENT, UNSPECIFIED WHETHER TRAUMATIC: ICD-10-CM

## 2023-01-30 PROCEDURE — 3008F PR BODY MASS INDEX (BMI) DOCUMENTED: ICD-10-PCS | Mod: CPTII,,, | Performed by: INTERNAL MEDICINE

## 2023-01-30 PROCEDURE — 3008F BODY MASS INDEX DOCD: CPT | Mod: CPTII,,, | Performed by: INTERNAL MEDICINE

## 2023-01-30 PROCEDURE — 1159F PR MEDICATION LIST DOCUMENTED IN MEDICAL RECORD: ICD-10-PCS | Mod: CPTII,,, | Performed by: INTERNAL MEDICINE

## 2023-01-30 PROCEDURE — 3077F PR MOST RECENT SYSTOLIC BLOOD PRESSURE >= 140 MM HG: ICD-10-PCS | Mod: CPTII,,, | Performed by: INTERNAL MEDICINE

## 2023-01-30 PROCEDURE — 3080F PR MOST RECENT DIASTOLIC BLOOD PRESSURE >= 90 MM HG: ICD-10-PCS | Mod: CPTII,,, | Performed by: INTERNAL MEDICINE

## 2023-01-30 PROCEDURE — 99215 OFFICE O/P EST HI 40 MIN: CPT | Mod: PBBFAC | Performed by: INTERNAL MEDICINE

## 2023-01-30 PROCEDURE — 3080F DIAST BP >= 90 MM HG: CPT | Mod: CPTII,,, | Performed by: INTERNAL MEDICINE

## 2023-01-30 PROCEDURE — 99214 OFFICE O/P EST MOD 30 MIN: CPT | Mod: S$PBB,,, | Performed by: INTERNAL MEDICINE

## 2023-01-30 PROCEDURE — 99214 PR OFFICE/OUTPT VISIT, EST, LEVL IV, 30-39 MIN: ICD-10-PCS | Mod: S$PBB,,, | Performed by: INTERNAL MEDICINE

## 2023-01-30 PROCEDURE — 3077F SYST BP >= 140 MM HG: CPT | Mod: CPTII,,, | Performed by: INTERNAL MEDICINE

## 2023-01-30 PROCEDURE — 99999 PR PBB SHADOW E&M-EST. PATIENT-LVL V: ICD-10-PCS | Mod: PBBFAC,,, | Performed by: INTERNAL MEDICINE

## 2023-01-30 PROCEDURE — 99999 PR PBB SHADOW E&M-EST. PATIENT-LVL V: CPT | Mod: PBBFAC,,, | Performed by: INTERNAL MEDICINE

## 2023-01-30 PROCEDURE — 1159F MED LIST DOCD IN RCRD: CPT | Mod: CPTII,,, | Performed by: INTERNAL MEDICINE

## 2023-01-30 RX ORDER — LEVETIRACETAM 1000 MG/1
1500 TABLET ORAL 2 TIMES DAILY
COMMUNITY

## 2023-01-30 RX ORDER — PREGABALIN 100 MG/1
100 CAPSULE ORAL 3 TIMES DAILY
Qty: 90 CAPSULE | Refills: 5 | Status: SHIPPED | OUTPATIENT
Start: 2023-01-30 | End: 2023-05-25 | Stop reason: SDUPTHER

## 2023-01-30 RX ORDER — HYDROXYCHLOROQUINE SULFATE 200 MG/1
200 TABLET, FILM COATED ORAL 2 TIMES DAILY
Qty: 60 TABLET | Refills: 5 | Status: SHIPPED | OUTPATIENT
Start: 2023-01-30 | End: 2023-05-25 | Stop reason: SDUPTHER

## 2023-01-30 RX ORDER — ASPIRIN 325 MG
50000 TABLET, DELAYED RELEASE (ENTERIC COATED) ORAL
Qty: 5 CAPSULE | Refills: 5 | Status: SHIPPED | OUTPATIENT
Start: 2023-01-30 | End: 2023-05-25 | Stop reason: SDUPTHER

## 2023-01-30 RX ORDER — FOLIC ACID 1 MG/1
1 TABLET ORAL DAILY
Qty: 30 TABLET | Refills: 5 | Status: SHIPPED | OUTPATIENT
Start: 2023-01-30 | End: 2023-05-25 | Stop reason: SDUPTHER

## 2023-01-30 RX ORDER — ZOLPIDEM TARTRATE 10 MG/1
10 TABLET ORAL NIGHTLY
Qty: 30 TABLET | Refills: 5 | Status: SHIPPED | OUTPATIENT
Start: 2023-01-30 | End: 2023-02-21 | Stop reason: ALTCHOICE

## 2023-01-30 RX ORDER — METHOTREXATE 2.5 MG/1
10 TABLET ORAL
Qty: 20 TABLET | Refills: 5 | Status: SHIPPED | OUTPATIENT
Start: 2023-01-30 | End: 2023-05-25 | Stop reason: SDUPTHER

## 2023-01-30 NOTE — PROGRESS NOTES
"Subjective:       Patient ID: Darius Restrepo is a 58 y.o. male.    Chief Complaint: Follow-up (Follow up. Patient states he had a very bad seizure about 2 weeks ago and knocked some teeth out. Pain 9/10)    The patient is complaining of joint pain involving the MCP PIP wrist elbow shoulders hips knees and ankles bilaterally.  The pain is 5/10 in intensity dull in quality and continuous.  That is associated with a morning stiffness lasting for more than 60 minutes.  Is also having difficulty maintaining a good night of sleep.  This has been associated with myalgias.  Muscle aches are 5/10 in intensity dull in quality and continuous.  They are associated with fatigue.  No fever no chills no others.      Review of Systems   Constitutional:  Negative for appetite change, chills and fever.   HENT:  Negative for congestion, ear pain, mouth sores, nosebleeds and trouble swallowing.    Eyes:  Negative for photophobia and discharge.   Respiratory:  Negative for chest tightness and shortness of breath.    Cardiovascular:  Negative for chest pain.   Gastrointestinal:  Negative for abdominal pain and vomiting.   Endocrine: Negative.    Genitourinary:  Negative for hematuria.   Musculoskeletal:         As per HPI   Skin:  Negative for rash.   Neurological:  Negative for weakness.       Objective:   BP (!) 191/108 (BP Location: Right arm, Patient Position: Sitting, BP Method: Large (Automatic))   Pulse 65   Temp 98.7 °F (37.1 °C) (Oral)   Ht 5' 7" (1.702 m)   Wt 117.4 kg (258 lb 12.8 oz)   SpO2 98%   BMI 40.53 kg/m²      Physical Exam   Constitutional: He is oriented to person, place, and time. He appears well-developed and well-nourished. No distress.   HENT:   Head: Normocephalic and atraumatic.   Right Ear: External ear normal.   Left Ear: External ear normal.   Eyes: Pupils are equal, round, and reactive to light.   Cardiovascular: Normal rate, regular rhythm and normal heart sounds.   Pulmonary/Chest: Breath sounds " normal.   Abdominal: Soft. There is no abdominal tenderness.   Musculoskeletal:      Right shoulder: Tenderness present.      Left shoulder: Tenderness present.      Right elbow: Tenderness present.      Left elbow: Tenderness present.      Right wrist: Tenderness present.      Left wrist: Tenderness present.      Cervical back: Neck supple.      Right hip: Tenderness present.      Left hip: Tenderness present.      Right knee: Tenderness present.      Left knee: Tenderness present.      Right ankle: Tenderness present.      Left ankle: Tenderness present.   Lymphadenopathy:     He has no cervical adenopathy.   Neurological: He is alert and oriented to person, place, and time. He displays normal reflexes. No cranial nerve deficit or sensory deficit. He exhibits normal muscle tone. Coordination normal.   Skin: No rash noted. No erythema.   Vitals reviewed.      Right Side Rheumatological Exam     The patient is tender to palpation of the shoulder, elbow, wrist, knee, 1st PIP, 1st MCP, 2nd PIP, 2nd MCP, 3rd PIP, 3rd MCP, 4th PIP, 4th MCP, 5th PIP, hip, ankle, 1st MTP, 2nd MTP, 3rd MTP, 4th MTP, 5th MTP, 1st toe IP, 2nd toe IP, 3rd toe IP, 4th toe IP and 5th toe IP    Left Side Rheumatological Exam     The patient is tender to palpation of the shoulder, elbow, wrist, knee, 1st PIP, 1st MCP, 2nd PIP, 2nd MCP, 3rd PIP, 3rd MCP, 4th PIP, 4th MCP, 5th PIP, 5th MCP, hip, ankle, 1st MTP, 2nd MTP, 3rd MTP, 4th MTP, 5th MTP, 1st toe IP, 2nd toe IP, 3rd toe IP, 4th toe IP and 5th toe IP.       Completed Fibromyalgia exam 18/18 tender points.  No data to display     Assessment:       1. Undifferentiated connective tissue disease    2. Fibromyalgia    3. Tear of right rotator cuff, unspecified tear extent, unspecified whether traumatic    4. Primary insomnia    5. Primary hypertension    6. Depressive disorder    7. Tear of rotator cuff, unspecified laterality, unspecified tear extent, unspecified whether traumatic             Medication List with Changes/Refills   New Medications    METHOTREXATE 2.5 MG TAB    Take 4 tablets (10 mg total) by mouth every 7 days. EVERY        TAKE 4 TAB TOGETHER AFTER SUPPER       Start Date: 1/30/2023 End Date: 7/29/2023   Current Medications    ALPRAZOLAM (XANAX) 0.5 MG TABLET    Take 1 tablet (0.5 mg total) by mouth 2 (two) times daily as needed for Anxiety.       Start Date: 9/16/2022 End Date: --    AMOXICILLIN-CLAVULANATE 875-125MG (AUGMENTIN) 875-125 MG PER TABLET    Take 1 tablet by mouth 2 (two) times daily.       Start Date: 12/14/2022End Date: --    ATORVASTATIN (LIPITOR) 20 MG TABLET    Take 20 mg by mouth once daily.       Start Date: 5/19/2022 End Date: --    BRIVIACT 100 MG TAB    Take 100 mg by mouth 2 (two) times daily.       Start Date: 12/7/2022 End Date: --    CLONIDINE 0.3 MG/24 HR TD PTWK (CATAPRES) 0.3 MG/24 HR    Place 1 patch onto the skin every 7 days.       Start Date: 5/25/2022 End Date: --    CYCLOBENZAPRINE (FLEXERIL) 10 MG TABLET    Take 10 mg by mouth 3 (three) times daily.       Start Date: --        End Date: --    EPINEPHRINE (EPIPEN) 0.3 MG/0.3 ML ATIN    Inject 0.3 mLs (0.3 mg total) into the muscle as needed (allergic reaction).       Start Date: 7/5/2022  End Date: 7/5/2023    ESCITALOPRAM OXALATE (LEXAPRO) 20 MG TABLET    Take 0.5 tablets (10 mg total) by mouth once daily. After breakfast       Start Date: 9/16/2022 End Date: --    HYDRALAZINE (APRESOLINE) 10 MG TABLET    Take 10 mg by mouth 3 (three) times daily.       Start Date: 10/31/2021End Date: --    HYDROCHLOROTHIAZIDE (HYDRODIURIL) 12.5 MG TAB    Take 12.5 mg by mouth once daily.       Start Date: --        End Date: --    HYDROCODONE-ACETAMINOPHEN (NORCO) 5-325 MG PER TABLET    Take 1 tablet by mouth every 6 (six) hours as needed for Pain.       Start Date: 1/22/2023 End Date: 1/30/2023    IBUPROFEN (ADVIL,MOTRIN) 800 MG TABLET    Take 1 tablet (800 mg total) by mouth every 6 (six) hours as needed for  Pain.       Start Date: 12/2/2022 End Date: --    LAMOTRIGINE (LAMICTAL) 100 MG TABLET    Take 100 mg by mouth 2 (two) times daily.       Start Date: 1/5/2023  End Date: --    LEVETIRACETAM (KEPPRA) 1000 MG TABLET    Take 1,000 mg by mouth 3 (three) times daily.       Start Date: --        End Date: --    LISINOPRIL-HYDROCHLOROTHIAZIDE (PRINZIDE,ZESTORETIC) 20-12.5 MG PER TABLET    Take 2 tablets by mouth nightly.       Start Date: 5/19/2022 End Date: --    LOSARTAN (COZAAR) 50 MG TABLET    Take 50 mg by mouth once daily.       Start Date: --        End Date: --    METHOTREXATE 2.5 MG TAB    Take 4 tablets (10 mg total) by mouth every 7 days. EVERY        TAKE 4 TAB TOGETHER AFTER SUPPER       Start Date: 9/16/2022 End Date: 3/15/2023    MUPIROCIN (BACTROBAN) 2 % OINTMENT    Apply topically 3 (three) times daily.       Start Date: 12/14/2022End Date: --    NICOTINE (NICODERM CQ) 21 MG/24 HR    Place 1 patch onto the skin once daily.       Start Date: 2/14/2022 End Date: --    OLANZAPINE (ZYPREXA) 5 MG TABLET    Take 5 mg by mouth nightly.       Start Date: 4/11/2022 End Date: --    OMEPRAZOLE (PRILOSEC) 40 MG CAPSULE    Take 1 capsule (40 mg total) by mouth once daily. Before lunch       Start Date: 9/16/2022 End Date: --    QUETIAPINE (SEROQUEL) 300 MG TAB    Take 300 mg by mouth nightly.       Start Date: 5/25/2022 End Date: --    RISPERIDONE (RISPERDAL M-TABS) 1 MG TBDL    Take 1 mg by mouth 2 (two) times daily.       Start Date: --        End Date: --    TEMAZEPAM (RESTORIL) 15 MG CAP    Take 1 capsule (15 mg total) by mouth every evening.       Start Date: 6/6/2022  End Date: --    TRAMADOL (ULTRAM) 50 MG TABLET    Take 1 tablet (50 mg total) by mouth every 6 (six) hours as needed for Pain.       Start Date: 9/16/2022 End Date: --   Changed and/or Refilled Medications    Modified Medication Previous Medication    CHOLECALCIFEROL, VITAMIN D3, 1,250 MCG (50,000 UNIT) CAPSULE cholecalciferol, vitamin D3, 1,250 mcg  (50,000 unit) capsule       Take 1 capsule (50,000 Units total) by mouth every 7 days.    Take 1 capsule (50,000 Units total) by mouth every 7 days.       Start Date: 1/30/2023 End Date: --    Start Date: 9/16/2022 End Date: 1/30/2023    FOLIC ACID (FOLVITE) 1 MG TABLET folic acid (FOLVITE) 1 MG tablet       Take 1 tablet (1 mg total) by mouth once daily. After food    Take 1 tablet (1 mg total) by mouth once daily. After food       Start Date: 1/30/2023 End Date: 7/29/2023    Start Date: 9/16/2022 End Date: 1/30/2023    HYDROXYCHLOROQUINE (PLAQUENIL) 200 MG TABLET hydrOXYchloroQUINE (PLAQUENIL) 200 mg tablet       Take 1 tablet (200 mg total) by mouth 2 (two) times daily.    Take 1 tablet (200 mg total) by mouth 2 (two) times daily.       Start Date: 1/30/2023 End Date: --    Start Date: 9/16/2022 End Date: 1/30/2023    PREGABALIN (LYRICA) 100 MG CAPSULE pregabalin (LYRICA) 100 MG capsule       Take 1 capsule (100 mg total) by mouth 3 (three) times daily.    Take 1 capsule (100 mg total) by mouth 3 (three) times daily.       Start Date: 1/30/2023 End Date: --    Start Date: 9/16/2022 End Date: 1/30/2023    ZOLPIDEM (AMBIEN) 10 MG TAB zolpidem (AMBIEN) 10 mg Tab       Take 1 tablet (10 mg total) by mouth every evening.    Take 1 tablet (10 mg total) by mouth every evening.       Start Date: 1/30/2023 End Date: --    Start Date: 9/16/2022 End Date: 1/30/2023   Discontinued Medications    ASPIRIN (ECOTRIN) 81 MG EC TABLET    Take 81 mg by mouth once daily.       Start Date: --        End Date: 1/30/2023    CIPROFLOXACIN HCL (CIPRO) 500 MG TABLET    Take 1 tablet (500 mg total) by mouth every 8 (eight) hours.       Start Date: 12/2/2022 End Date: 1/30/2023    CLOPIDOGREL (PLAVIX) 75 MG TABLET    Take 75 mg by mouth once daily.       Start Date: --        End Date: 1/30/2023    DICLOFENAC (VOLTAREN) 50 MG EC TABLET    Take 1 tablet (50 mg total) by mouth 2 (two) times daily as needed (pain, after food).       Start  Date: 9/16/2022 End Date: 1/30/2023    LEFLUNOMIDE (ARAVA) 10 MG TAB    Take 10 mg by mouth.       Start Date: 3/3/2022  End Date: 1/30/2023    LEVETIRACETAM (KEPPRA) 500 MG TAB    Take 1 tablet (500 mg total) by mouth 2 (two) times daily.       Start Date: 5/23/2022 End Date: 1/30/2023         Plan:         Problem List Items Addressed This Visit          Psychiatric    Depressive disorder    Relevant Medications    cholecalciferol, vitamin D3, 1,250 mcg (50,000 unit) capsule    folic acid (FOLVITE) 1 MG tablet    hydrOXYchloroQUINE (PLAQUENIL) 200 mg tablet    pregabalin (LYRICA) 100 MG capsule    zolpidem (AMBIEN) 10 mg Tab    methotrexate 2.5 MG Tab       Cardiac/Vascular    Hypertension    Relevant Medications    cholecalciferol, vitamin D3, 1,250 mcg (50,000 unit) capsule    folic acid (FOLVITE) 1 MG tablet    hydrOXYchloroQUINE (PLAQUENIL) 200 mg tablet    pregabalin (LYRICA) 100 MG capsule    zolpidem (AMBIEN) 10 mg Tab    methotrexate 2.5 MG Tab       Immunology/Multi System    Undifferentiated connective tissue disease - Primary    Relevant Medications    cholecalciferol, vitamin D3, 1,250 mcg (50,000 unit) capsule    folic acid (FOLVITE) 1 MG tablet    hydrOXYchloroQUINE (PLAQUENIL) 200 mg tablet    pregabalin (LYRICA) 100 MG capsule    zolpidem (AMBIEN) 10 mg Tab    methotrexate 2.5 MG Tab       Orthopedic    Fibromyalgia    Relevant Medications    cholecalciferol, vitamin D3, 1,250 mcg (50,000 unit) capsule    folic acid (FOLVITE) 1 MG tablet    hydrOXYchloroQUINE (PLAQUENIL) 200 mg tablet    pregabalin (LYRICA) 100 MG capsule    zolpidem (AMBIEN) 10 mg Tab    methotrexate 2.5 MG Tab    Tear of rotator cuff    Relevant Medications    cholecalciferol, vitamin D3, 1,250 mcg (50,000 unit) capsule    folic acid (FOLVITE) 1 MG tablet    hydrOXYchloroQUINE (PLAQUENIL) 200 mg tablet    pregabalin (LYRICA) 100 MG capsule    zolpidem (AMBIEN) 10 mg Tab    methotrexate 2.5 MG Tab       Other    Insomnia     Relevant Medications    cholecalciferol, vitamin D3, 1,250 mcg (50,000 unit) capsule    folic acid (FOLVITE) 1 MG tablet    hydrOXYchloroQUINE (PLAQUENIL) 200 mg tablet    pregabalin (LYRICA) 100 MG capsule    zolpidem (AMBIEN) 10 mg Tab    methotrexate 2.5 MG Tab

## 2023-02-06 DIAGNOSIS — I10 PRIMARY HYPERTENSION: ICD-10-CM

## 2023-02-06 DIAGNOSIS — M35.9 UNDIFFERENTIATED CONNECTIVE TISSUE DISEASE: ICD-10-CM

## 2023-02-06 DIAGNOSIS — M79.7 FIBROMYALGIA: ICD-10-CM

## 2023-02-06 DIAGNOSIS — F51.01 PRIMARY INSOMNIA: ICD-10-CM

## 2023-02-06 DIAGNOSIS — M75.100 TEAR OF ROTATOR CUFF, UNSPECIFIED LATERALITY, UNSPECIFIED TEAR EXTENT, UNSPECIFIED WHETHER TRAUMATIC: ICD-10-CM

## 2023-02-06 DIAGNOSIS — F32.A DEPRESSIVE DISORDER: ICD-10-CM

## 2023-02-06 RX ORDER — HYDROCODONE BITARTRATE AND ACETAMINOPHEN 10; 325 MG/1; MG/1
1 TABLET ORAL EVERY 8 HOURS PRN
Qty: 90 TABLET | Refills: 0 | Status: SHIPPED | OUTPATIENT
Start: 2023-02-06 | End: 2023-03-03 | Stop reason: SDUPTHER

## 2023-02-21 ENCOUNTER — HOSPITAL ENCOUNTER (EMERGENCY)
Facility: HOSPITAL | Age: 58
Discharge: HOME | End: 2023-02-21
Attending: EMERGENCY MEDICINE
Payer: MEDICAID

## 2023-02-21 VITALS
DIASTOLIC BLOOD PRESSURE: 93 MMHG | HEART RATE: 98 BPM | TEMPERATURE: 98 F | RESPIRATION RATE: 31 BRPM | OXYGEN SATURATION: 95 % | SYSTOLIC BLOOD PRESSURE: 176 MMHG | WEIGHT: 270 LBS | HEIGHT: 67 IN | BODY MASS INDEX: 42.38 KG/M2

## 2023-02-21 DIAGNOSIS — R56.9 SEIZURE: Primary | ICD-10-CM

## 2023-02-21 DIAGNOSIS — G40.909 SEIZURE DISORDER: ICD-10-CM

## 2023-02-21 DIAGNOSIS — S00.83XA CONTUSION OF FOREHEAD, INITIAL ENCOUNTER: ICD-10-CM

## 2023-02-21 DIAGNOSIS — M54.2 NECK PAIN, MUSCULOSKELETAL: ICD-10-CM

## 2023-02-21 LAB
ALBUMIN SERPL-MCNC: 4.1 G/DL (ref 3.5–5)
ALBUMIN/GLOB SERPL: 1.1 RATIO (ref 1.1–2)
ALP SERPL-CCNC: 66 UNIT/L (ref 40–150)
ALT SERPL-CCNC: 30 UNIT/L (ref 0–55)
AST SERPL-CCNC: 28 UNIT/L (ref 5–34)
BASOPHILS # BLD AUTO: 0.05 X10(3)/MCL (ref 0–0.2)
BASOPHILS NFR BLD AUTO: 0.8 %
BILIRUBIN DIRECT+TOT PNL SERPL-MCNC: 0.4 MG/DL
BUN SERPL-MCNC: 9.2 MG/DL (ref 8.4–25.7)
CALCIUM SERPL-MCNC: 9.4 MG/DL (ref 8.4–10.2)
CHLORIDE SERPL-SCNC: 105 MMOL/L (ref 98–107)
CO2 SERPL-SCNC: 18 MMOL/L (ref 22–29)
CREAT SERPL-MCNC: 0.87 MG/DL (ref 0.73–1.18)
EOSINOPHIL # BLD AUTO: 0.24 X10(3)/MCL (ref 0–0.9)
EOSINOPHIL NFR BLD AUTO: 4 %
ERYTHROCYTE [DISTWIDTH] IN BLOOD BY AUTOMATED COUNT: 16.1 % (ref 11.5–17)
ETHANOL SERPL-MCNC: 15 MG/DL
GFR SERPLBLD CREATININE-BSD FMLA CKD-EPI: >60 MLS/MIN/1.73/M2
GLOBULIN SER-MCNC: 3.8 GM/DL (ref 2.4–3.5)
GLUCOSE SERPL-MCNC: 156 MG/DL (ref 74–100)
HCT VFR BLD AUTO: 45.9 % (ref 42–52)
HGB BLD-MCNC: 14.3 G/DL (ref 14–18)
IMM GRANULOCYTES # BLD AUTO: 0.01 X10(3)/MCL (ref 0–0.04)
IMM GRANULOCYTES NFR BLD AUTO: 0.2 %
LYMPHOCYTES # BLD AUTO: 2.54 X10(3)/MCL (ref 0.6–4.6)
LYMPHOCYTES NFR BLD AUTO: 42.1 %
MCH RBC QN AUTO: 26.2 PG
MCHC RBC AUTO-ENTMCNC: 31.2 G/DL (ref 33–36)
MCV RBC AUTO: 84.1 FL (ref 80–94)
MONOCYTES # BLD AUTO: 0.58 X10(3)/MCL (ref 0.1–1.3)
MONOCYTES NFR BLD AUTO: 9.6 %
NEUTROPHILS # BLD AUTO: 2.62 X10(3)/MCL (ref 2.1–9.2)
NEUTROPHILS NFR BLD AUTO: 43.3 %
PLATELET # BLD AUTO: 249 X10(3)/MCL (ref 130–400)
PMV BLD AUTO: 9.5 FL (ref 7.4–10.4)
POTASSIUM SERPL-SCNC: 3.4 MMOL/L (ref 3.5–5.1)
PROT SERPL-MCNC: 7.9 GM/DL (ref 6.4–8.3)
RBC # BLD AUTO: 5.46 X10(6)/MCL (ref 4.7–6.1)
SODIUM SERPL-SCNC: 138 MMOL/L (ref 136–145)
WBC # SPEC AUTO: 6 X10(3)/MCL (ref 4.5–11.5)

## 2023-02-21 PROCEDURE — 82077 ASSAY SPEC XCP UR&BREATH IA: CPT | Performed by: EMERGENCY MEDICINE

## 2023-02-21 PROCEDURE — 96372 THER/PROPH/DIAG INJ SC/IM: CPT | Mod: 59 | Performed by: EMERGENCY MEDICINE

## 2023-02-21 PROCEDURE — 96374 THER/PROPH/DIAG INJ IV PUSH: CPT

## 2023-02-21 PROCEDURE — 25000003 PHARM REV CODE 250: Performed by: EMERGENCY MEDICINE

## 2023-02-21 PROCEDURE — 80053 COMPREHEN METABOLIC PANEL: CPT | Performed by: EMERGENCY MEDICINE

## 2023-02-21 PROCEDURE — 85025 COMPLETE CBC W/AUTO DIFF WBC: CPT | Performed by: EMERGENCY MEDICINE

## 2023-02-21 PROCEDURE — 63600175 PHARM REV CODE 636 W HCPCS: Performed by: EMERGENCY MEDICINE

## 2023-02-21 PROCEDURE — 99285 EMERGENCY DEPT VISIT HI MDM: CPT | Mod: 25

## 2023-02-21 RX ORDER — HYDROCODONE BITARTRATE AND ACETAMINOPHEN 10; 325 MG/1; MG/1
1 TABLET ORAL
Status: COMPLETED | OUTPATIENT
Start: 2023-02-21 | End: 2023-02-21

## 2023-02-21 RX ORDER — LORAZEPAM 2 MG/ML
1 INJECTION INTRAMUSCULAR
Status: COMPLETED | OUTPATIENT
Start: 2023-02-21 | End: 2023-02-21

## 2023-02-21 RX ORDER — CARVEDILOL 12.5 MG/1
12.5 TABLET ORAL 2 TIMES DAILY
COMMUNITY
Start: 2023-02-21

## 2023-02-21 RX ORDER — SERTRALINE HYDROCHLORIDE 100 MG/1
100 TABLET, FILM COATED ORAL DAILY
COMMUNITY
Start: 2023-01-18

## 2023-02-21 RX ORDER — ACETAMINOPHEN AND CODEINE PHOSPHATE 300; 30 MG/1; MG/1
1 TABLET ORAL DAILY
COMMUNITY
Start: 2023-02-15 | End: 2023-03-02 | Stop reason: SDUPTHER

## 2023-02-21 RX ORDER — DICLOFENAC SODIUM 50 MG/1
50 TABLET, DELAYED RELEASE ORAL 2 TIMES DAILY
COMMUNITY
Start: 2023-02-05 | End: 2023-05-25 | Stop reason: SDUPTHER

## 2023-02-21 RX ORDER — AMLODIPINE BESYLATE 10 MG/1
10 TABLET ORAL DAILY
COMMUNITY
Start: 2023-01-26

## 2023-02-21 RX ORDER — KETOROLAC TROMETHAMINE 30 MG/ML
30 INJECTION, SOLUTION INTRAMUSCULAR; INTRAVENOUS
Status: COMPLETED | OUTPATIENT
Start: 2023-02-21 | End: 2023-02-21

## 2023-02-21 RX ORDER — RISPERIDONE 1 MG/1
1 TABLET ORAL 2 TIMES DAILY
COMMUNITY
Start: 2023-01-05

## 2023-02-21 RX ORDER — LAMOTRIGINE 200 MG/1
200 TABLET ORAL 2 TIMES DAILY
COMMUNITY
Start: 2023-01-26

## 2023-02-21 RX ORDER — METOPROLOL TARTRATE 1 MG/ML
5 INJECTION, SOLUTION INTRAVENOUS
Status: COMPLETED | OUTPATIENT
Start: 2023-02-21 | End: 2023-02-21

## 2023-02-21 RX ORDER — CLONIDINE HYDROCHLORIDE 0.2 MG/1
0.2 TABLET ORAL
Status: COMPLETED | OUTPATIENT
Start: 2023-02-21 | End: 2023-02-21

## 2023-02-21 RX ADMIN — CLONIDINE HYDROCHLORIDE 0.2 MG: 0.2 TABLET ORAL at 04:02

## 2023-02-21 RX ADMIN — HYDROCODONE BITARTRATE AND ACETAMINOPHEN 1 TABLET: 10; 325 TABLET ORAL at 05:02

## 2023-02-21 RX ADMIN — LORAZEPAM 1 MG: 2 INJECTION INTRAMUSCULAR; INTRAVENOUS at 04:02

## 2023-02-21 RX ADMIN — METOPROLOL TARTRATE 5 MG: 1 INJECTION, SOLUTION INTRAVENOUS at 05:02

## 2023-02-21 RX ADMIN — KETOROLAC TROMETHAMINE 30 MG: 30 INJECTION, SOLUTION INTRAMUSCULAR; INTRAVENOUS at 04:02

## 2023-02-21 NOTE — ED PROVIDER NOTES
Encounter Date: 2/21/2023       History     Chief Complaint   Patient presents with    Seizures     Pt arrived per family after family found pt on ground at home upon arrival. Pt reports that he had a seizure, took meds yesterday, does not take Keppra daily recently. Pt c/o neck pain,  lac to forehead and abrasions to right shin. Pt denies numbness or tingling to extremities. Pt reports that he drank a few daiquiris today. C-Collar placed upon arrival. Pt alert, responsive, airway maintained, pt drowsy. Denies taking blood thinners.     This 58-year-old man was found lying on the ground and concrete face down.  He has a history of seizure disorder and is presumed to have had a seizure.  He complains of neck pain.  He reports that his last seizure was a month ago.  He reports that he takes Keppra.  He reports that he has been drinking.  He denies any numbness in his arms or legs.     Review of patient's allergies indicates:   Allergen Reactions    Peanut Anaphylaxis     Past Medical History:   Diagnosis Date    CVA (cerebral vascular accident)     Epilepsy     GERD (gastroesophageal reflux disease)     Hyperlipidemia     Hypertension     KRISTIN on CPAP     TIA (transient ischemic attack)      Past Surgical History:   Procedure Laterality Date    A-V CARDIAC PACEMAKER INSERTION N/A 12/02/2022    Procedure: INSERTION, CARDIAC PACEMAKER, DUAL CHAMBER;  Surgeon: Hemant Maravilla DO;  Location: [facility] CATH LAB;  Service: Cardiology;  Laterality: N/A;  EXPLANT OF EVENT RECORDER /  IMPLANT OF DUAL CHAMBER PPM (SJM)    HIP SURGERY Left     Marito inserted from knee to hip    INSERTION OF IMPLANTABLE LOOP RECORDER  03/25/2022    Dr. Maravilla    INSERTION OF PERMANENT PACEMAKER  12/02/2022    Dr. Maravilla    REMOVAL OF IMPLANTABLE LOOP RECORDER  12/02/2022    Dr. Maravilla    SKIN TAG REMOVAL      THUMB SURGERY       Family History   Problem Relation Age of Onset    Hypertension Mother     Hypertension Sister     Diabetes Sister      Hypertension Sister     Hypertension Sister     Hypertension Sister     Hypertension Brother     Hypertension Brother     Hypertension Brother     Diabetes Paternal Grandmother      Social History     Tobacco Use    Smoking status: Every Day     Packs/day: 0.25     Types: Cigarettes    Smokeless tobacco: Never    Tobacco comments:     Nicotine patches used    Substance Use Topics    Alcohol use: Yes     Comment: 1-2 times weekly    Drug use: Not Currently     Review of Systems   Constitutional:  Negative for fever.   HENT:  Negative for sore throat.    Respiratory:  Negative for shortness of breath.    Cardiovascular:  Negative for chest pain.   Gastrointestinal:  Negative for nausea.   Genitourinary:  Negative for dysuria.   Musculoskeletal:  Positive for neck pain. Negative for back pain.   Skin:  Negative for rash.   Neurological:  Negative for weakness.   Hematological:  Does not bruise/bleed easily.     Physical Exam     Initial Vitals [02/21/23 1518]   BP Pulse Resp Temp SpO2   (!) 166/101 110 16 98 °F (36.7 °C) 97 %      MAP       --         Physical Exam    Nursing note and vitals reviewed.  Constitutional: He appears well-developed and well-nourished.   HENT:   Mouth/Throat: Mucous membranes are normal.   Eyes: EOM are normal. Pupils are equal, round, and reactive to light.   Neck:   Pain on plapation   Cardiovascular:  Normal rate, regular rhythm, normal heart sounds and intact distal pulses.           Pulmonary/Chest: Breath sounds normal.   Abdominal: Abdomen is soft. Bowel sounds are normal.   Musculoskeletal:         General: Normal range of motion.     Neurological: He is alert and oriented to person, place, and time. He has normal strength.   Skin: Skin is warm and dry. Capillary refill takes less than 2 seconds.   Psychiatric: He has a normal mood and affect. His behavior is normal. Judgment and thought content normal.       ED Course   Procedures  Labs Reviewed   COMPREHENSIVE METABOLIC PANEL -  Abnormal; Notable for the following components:       Result Value    Potassium Level 3.4 (*)     Carbon Dioxide 18 (*)     Glucose Level 156 (*)     Globulin 3.8 (*)     All other components within normal limits   ALCOHOL,MEDICAL (ETHANOL) - Abnormal; Notable for the following components:    Ethanol Level 15.0 (*)     All other components within normal limits   CBC WITH DIFFERENTIAL - Abnormal; Notable for the following components:    MCHC 31.2 (*)     All other components within normal limits   CBC W/ AUTO DIFFERENTIAL    Narrative:     The following orders were created for panel order CBC auto differential.  Procedure                               Abnormality         Status                     ---------                               -----------         ------                     CBC with Differential[[id number]]        Abnormal            Final result                 Please view results for these tests on the individual orders.          Imaging Results              CT Cervical Spine Without Contrast (Final result)  Result time 02/21/23 15:59:33      Final result by Jaime Fernandes MD (02/21/23 15:59:33)                   Impression:      1. No acute cervical spine abnormality identified.    2. Ligament, spinal cord and/or vascular abnormalities cannot be excluded on the basis of this examination.      Electronically signed by: Jaime Fernandes  Date:    02/21/2023  Time:    15:59               Narrative:    EXAMINATION:  CT CERVICAL SPINE WITHOUT CONTRAST    CLINICAL HISTORY:  Neck trauma, dangerous injury mechanism (Age 16-64y);Seizure with head injury and neck pain;    TECHNIQUE:  CT of the cervical spine Without contrast. Sagittal and coronal reconstructions were performed on the source images.    Automatic exposure control was utilized to reduce the patient's radiation dose.    DLP = 610    COMPARISON:  02/25/2016    FINDINGS:  There is no acute fracture, subluxation, or dislocation. Limited detail regarding  cervical discs, but there is no finding seen to suggest acute disc herniation. The lateral masses are symmetric about the dens. Straightening of the normal lordotic curvature.    The prevertebral soft tissues are normal. There is no lymphadenopathy.                                       CT Head Without Contrast (Final result)  Result time 02/21/23 15:59:01      Final result by Renae Britt MD (02/21/23 15:59:01)                   Impression:      No acute abnormality seen      Electronically signed by: Renae Britt  Date:    02/21/2023  Time:    15:59               Narrative:    EXAMINATION:  CT HEAD WITHOUT CONTRAST    CLINICAL HISTORY:  Head trauma, moderate-severe;Seizure with head injury;    TECHNIQUE:  Multiple axial images were obtained from the base of the brain to the vertex without contrast administration.  Sagittal and coronal reconstructions were performed. .Automatic exposure control  (AEC) is utilized to reduce patient radiation exposure.    COMPARISON:  None    FINDINGS:  There is no intracranial mass or lesion seen.  No hemorrhage is seen.  No infarct is seen.  The ventricles and basilar cisterns appear normal.  Brain parenchyma appears grossly unremarkable.    Posterior fossa appears normal.  The calvarium is intact.  The paranasal sinuses appear grossly unremarkable.  There is a small lipoma in the scalp at the cerebral convexity on the right side                                       Medications   ketorolac injection 30 mg (30 mg Intramuscular Given 2/21/23 1632)   LORazepam injection 1 mg (1 mg Intramuscular Given 2/21/23 1633)   cloNIDine tablet 0.2 mg (0.2 mg Oral Given 2/21/23 1634)                              Clinical Impression:   Final diagnoses:  [R56.9] Seizure (Primary)  [G40.909] Seizure disorder  [S00.83XA] Contusion of forehead, initial encounter  [M54.2] Neck pain, musculoskeletal        ED Disposition Condition    Discharge Stable          ED Prescriptions    None        Follow-up Information       Follow up With Specialties Details Why Contact Info    Polina Jarrell MD  Schedule an appointment as soon as possible for a visit   [address] [place] [id number] [phone]               Abhinav Kelly MD  02/21/23 1572

## 2023-02-23 ENCOUNTER — TELEPHONE (OUTPATIENT)
Dept: RHEUMATOLOGY | Facility: CLINIC | Age: 58
End: 2023-02-23
Payer: MEDICAID

## 2023-02-23 NOTE — TELEPHONE ENCOUNTER
----- Message from Regina Sepulveda sent at 2/22/2023  3:44 PM CST -----  Regarding: medication  Wife called stating that patient is receiving duplicate medication. Was advised that Dr. Viramontes should not be prescribing Lexapro because patient is receiving similar medication from another doctor.

## 2023-02-23 NOTE — TELEPHONE ENCOUNTER
This has been addressed twice in the past . But I am unable to see your note.  Please advise the message below. And I will advise the patient. Thanks

## 2023-02-23 NOTE — TELEPHONE ENCOUNTER
I spoke w/ patients wife Brianne.  His PCP had him d/c lexapro, because he is taking three medications that are in the same category.  He is taking Seroquel, risperidone and Zoloft.  She wanted Dr. Viramontes to be notified.

## 2023-03-02 DIAGNOSIS — M75.100 TEAR OF ROTATOR CUFF, UNSPECIFIED LATERALITY, UNSPECIFIED TEAR EXTENT, UNSPECIFIED WHETHER TRAUMATIC: ICD-10-CM

## 2023-03-02 DIAGNOSIS — F51.01 PRIMARY INSOMNIA: ICD-10-CM

## 2023-03-02 DIAGNOSIS — M35.9 UNDIFFERENTIATED CONNECTIVE TISSUE DISEASE: ICD-10-CM

## 2023-03-02 DIAGNOSIS — F32.A DEPRESSIVE DISORDER: ICD-10-CM

## 2023-03-02 DIAGNOSIS — I10 PRIMARY HYPERTENSION: ICD-10-CM

## 2023-03-02 DIAGNOSIS — M79.7 FIBROMYALGIA: ICD-10-CM

## 2023-03-02 RX ORDER — ACETAMINOPHEN AND CODEINE PHOSPHATE 300; 30 MG/1; MG/1
1 TABLET ORAL 3 TIMES DAILY PRN
Qty: 90 TABLET | Refills: 0 | Status: SHIPPED | OUTPATIENT
Start: 2023-03-02 | End: 2023-04-11

## 2023-03-02 RX ORDER — HYDROCODONE BITARTRATE AND ACETAMINOPHEN 10; 325 MG/1; MG/1
1 TABLET ORAL EVERY 8 HOURS PRN
Qty: 90 TABLET | Refills: 0 | OUTPATIENT
Start: 2023-03-02

## 2023-03-03 DIAGNOSIS — I10 PRIMARY HYPERTENSION: ICD-10-CM

## 2023-03-03 DIAGNOSIS — M79.7 FIBROMYALGIA: ICD-10-CM

## 2023-03-03 DIAGNOSIS — M35.9 UNDIFFERENTIATED CONNECTIVE TISSUE DISEASE: ICD-10-CM

## 2023-03-03 DIAGNOSIS — M75.100 TEAR OF ROTATOR CUFF, UNSPECIFIED LATERALITY, UNSPECIFIED TEAR EXTENT, UNSPECIFIED WHETHER TRAUMATIC: ICD-10-CM

## 2023-03-03 DIAGNOSIS — F32.A DEPRESSIVE DISORDER: ICD-10-CM

## 2023-03-03 DIAGNOSIS — F51.01 PRIMARY INSOMNIA: ICD-10-CM

## 2023-03-03 RX ORDER — HYDROCODONE BITARTRATE AND ACETAMINOPHEN 10; 325 MG/1; MG/1
1 TABLET ORAL EVERY 8 HOURS PRN
Qty: 90 TABLET | Refills: 0 | Status: SHIPPED | OUTPATIENT
Start: 2023-03-03 | End: 2023-04-06

## 2023-03-08 ENCOUNTER — HOSPITAL ENCOUNTER (EMERGENCY)
Facility: HOSPITAL | Age: 58
Discharge: HOME | End: 2023-03-08
Attending: FAMILY MEDICINE
Payer: MEDICAID

## 2023-03-08 VITALS
TEMPERATURE: 98 F | BODY MASS INDEX: 38.57 KG/M2 | SYSTOLIC BLOOD PRESSURE: 132 MMHG | WEIGHT: 240 LBS | DIASTOLIC BLOOD PRESSURE: 82 MMHG | RESPIRATION RATE: 18 BRPM | HEART RATE: 62 BPM | HEIGHT: 66 IN | OXYGEN SATURATION: 97 %

## 2023-03-08 DIAGNOSIS — R56.9 SEIZURE: Primary | ICD-10-CM

## 2023-03-08 DIAGNOSIS — R07.9 CHEST PAIN: ICD-10-CM

## 2023-03-08 LAB
ALBUMIN SERPL-MCNC: 4.5 G/DL (ref 3.5–5)
ALBUMIN/GLOB SERPL: 1.2 RATIO (ref 1.1–2)
ALP SERPL-CCNC: 77 UNIT/L (ref 40–150)
ALT SERPL-CCNC: 35 UNIT/L (ref 0–55)
APPEARANCE UR: CLEAR
AST SERPL-CCNC: 21 UNIT/L (ref 5–34)
BACTERIA #/AREA URNS AUTO: NORMAL /HPF
BASOPHILS # BLD AUTO: 0.04 X10(3)/MCL (ref 0–0.2)
BASOPHILS NFR BLD AUTO: 0.8 %
BILIRUB UR QL STRIP.AUTO: NEGATIVE MG/DL
BILIRUBIN DIRECT+TOT PNL SERPL-MCNC: 0.6 MG/DL
BUN SERPL-MCNC: 17.1 MG/DL (ref 8.4–25.7)
CALCIUM SERPL-MCNC: 9.9 MG/DL (ref 8.4–10.2)
CHLORIDE SERPL-SCNC: 104 MMOL/L (ref 98–107)
CO2 SERPL-SCNC: 26 MMOL/L (ref 22–29)
COLOR UR AUTO: YELLOW
CREAT SERPL-MCNC: 1.15 MG/DL (ref 0.73–1.18)
EOSINOPHIL # BLD AUTO: 0.12 X10(3)/MCL (ref 0–0.9)
EOSINOPHIL NFR BLD AUTO: 2.4 %
ERYTHROCYTE [DISTWIDTH] IN BLOOD BY AUTOMATED COUNT: 16.2 % (ref 11.5–17)
GFR SERPLBLD CREATININE-BSD FMLA CKD-EPI: >60 MLS/MIN/1.73/M2
GLOBULIN SER-MCNC: 3.8 GM/DL (ref 2.4–3.5)
GLUCOSE SERPL-MCNC: 102 MG/DL (ref 74–100)
GLUCOSE UR QL STRIP.AUTO: NEGATIVE MG/DL
HCT VFR BLD AUTO: 44.2 % (ref 42–52)
HGB BLD-MCNC: 14.4 G/DL (ref 14–18)
IMM GRANULOCYTES # BLD AUTO: 0 X10(3)/MCL (ref 0–0.04)
IMM GRANULOCYTES NFR BLD AUTO: 0 %
KETONES UR QL STRIP.AUTO: NEGATIVE MG/DL
LEUKOCYTE ESTERASE UR QL STRIP.AUTO: NEGATIVE UNIT/L
LYMPHOCYTES # BLD AUTO: 1.47 X10(3)/MCL (ref 0.6–4.6)
LYMPHOCYTES NFR BLD AUTO: 30 %
MCH RBC QN AUTO: 27.1 PG
MCHC RBC AUTO-ENTMCNC: 32.6 G/DL (ref 33–36)
MCV RBC AUTO: 83.1 FL (ref 80–94)
MONOCYTES # BLD AUTO: 0.42 X10(3)/MCL (ref 0.1–1.3)
MONOCYTES NFR BLD AUTO: 8.6 %
NEUTROPHILS # BLD AUTO: 2.85 X10(3)/MCL (ref 2.1–9.2)
NEUTROPHILS NFR BLD AUTO: 58.2 %
NITRITE UR QL STRIP.AUTO: NEGATIVE
PH UR STRIP.AUTO: 5.5 [PH]
PLATELET # BLD AUTO: 318 X10(3)/MCL (ref 130–400)
PMV BLD AUTO: 9.7 FL (ref 7.4–10.4)
POTASSIUM SERPL-SCNC: 3.8 MMOL/L (ref 3.5–5.1)
PROT SERPL-MCNC: 8.3 GM/DL (ref 6.4–8.3)
PROT UR QL STRIP.AUTO: NEGATIVE MG/DL
RBC # BLD AUTO: 5.32 X10(6)/MCL (ref 4.7–6.1)
RBC #/AREA URNS AUTO: NORMAL /HPF
RBC UR QL AUTO: NEGATIVE UNIT/L
SODIUM SERPL-SCNC: 139 MMOL/L (ref 136–145)
SP GR UR STRIP.AUTO: >=1.03
SQUAMOUS #/AREA URNS AUTO: NORMAL /HPF
UROBILINOGEN UR STRIP-ACNC: 0.2 MG/DL
WBC # SPEC AUTO: 4.9 X10(3)/MCL (ref 4.5–11.5)
WBC #/AREA URNS AUTO: NORMAL /HPF

## 2023-03-08 PROCEDURE — 93010 EKG 12-LEAD: ICD-10-PCS | Mod: ,,, | Performed by: INTERNAL MEDICINE

## 2023-03-08 PROCEDURE — 96374 THER/PROPH/DIAG INJ IV PUSH: CPT

## 2023-03-08 PROCEDURE — 80053 COMPREHEN METABOLIC PANEL: CPT | Performed by: FAMILY MEDICINE

## 2023-03-08 PROCEDURE — 93010 ELECTROCARDIOGRAM REPORT: CPT | Mod: ,,, | Performed by: INTERNAL MEDICINE

## 2023-03-08 PROCEDURE — 81001 URINALYSIS AUTO W/SCOPE: CPT | Performed by: FAMILY MEDICINE

## 2023-03-08 PROCEDURE — 96361 HYDRATE IV INFUSION ADD-ON: CPT

## 2023-03-08 PROCEDURE — 99285 EMERGENCY DEPT VISIT HI MDM: CPT | Mod: 25,27

## 2023-03-08 PROCEDURE — 80175 DRUG SCREEN QUAN LAMOTRIGINE: CPT | Performed by: FAMILY MEDICINE

## 2023-03-08 PROCEDURE — 63600175 PHARM REV CODE 636 W HCPCS: Performed by: FAMILY MEDICINE

## 2023-03-08 PROCEDURE — 93005 ELECTROCARDIOGRAM TRACING: CPT

## 2023-03-08 PROCEDURE — 80177 DRUG SCRN QUAN LEVETIRACETAM: CPT | Mod: 90 | Performed by: FAMILY MEDICINE

## 2023-03-08 PROCEDURE — 25000003 PHARM REV CODE 250: Performed by: FAMILY MEDICINE

## 2023-03-08 PROCEDURE — 85025 COMPLETE CBC W/AUTO DIFF WBC: CPT | Performed by: FAMILY MEDICINE

## 2023-03-08 RX ORDER — ACETAMINOPHEN 500 MG
1000 TABLET ORAL
Status: COMPLETED | OUTPATIENT
Start: 2023-03-08 | End: 2023-03-08

## 2023-03-08 RX ORDER — LORAZEPAM 2 MG/ML
1 INJECTION INTRAMUSCULAR
Status: COMPLETED | OUTPATIENT
Start: 2023-03-08 | End: 2023-03-08

## 2023-03-08 RX ADMIN — SODIUM CHLORIDE 500 ML: 9 INJECTION, SOLUTION INTRAVENOUS at 09:03

## 2023-03-08 RX ADMIN — LORAZEPAM 1 MG: 2 INJECTION INTRAMUSCULAR; INTRAVENOUS at 09:03

## 2023-03-08 RX ADMIN — ACETAMINOPHEN 1000 MG: 500 TABLET, FILM COATED ORAL at 11:03

## 2023-03-08 NOTE — ED PROVIDER NOTES
Encounter Date: 3/8/2023       History     Chief Complaint   Patient presents with    Fall     Wife reports had 3 seizures this past week yesterday fell face first knocked out + loc had another seizure today fell face first complaining of severe headache      Seizures   58-year-old male patient over the last week has had 3 seizures last seizure being today with a fall otherwise patient has no nausea no vomiting does have headache no fever chills or night sweats no other complaints patient is on 3 seizure medicines was recently prescribed a 4th but was not able to get the medication filled otherwise patient has no current seizure activity and has not had seizure activity while here in the ED patient only complained of headache.      Review of patient's allergies indicates:   Allergen Reactions    Peanut Anaphylaxis     Past Medical History:   Diagnosis Date    CVA (cerebral vascular accident)     Epilepsy     GERD (gastroesophageal reflux disease)     Hyperlipidemia     Hypertension     KRISTIN on CPAP     TIA (transient ischemic attack)      Past Surgical History:   Procedure Laterality Date    A-V CARDIAC PACEMAKER INSERTION N/A 12/02/2022    Procedure: INSERTION, CARDIAC PACEMAKER, DUAL CHAMBER;  Surgeon: Hemant Maravilla DO;  Location: [facility] CATH LAB;  Service: Cardiology;  Laterality: N/A;  EXPLANT OF EVENT RECORDER /  IMPLANT OF DUAL CHAMBER PPM (SJM)    HIP SURGERY Left     Marito inserted from knee to hip    INSERTION OF IMPLANTABLE LOOP RECORDER  03/25/2022    Dr. Maravilla    INSERTION OF PERMANENT PACEMAKER  12/02/2022    Dr. Maravilla    REMOVAL OF IMPLANTABLE LOOP RECORDER  12/02/2022    Dr. Maravilla    SKIN TAG REMOVAL      THUMB SURGERY       Family History   Problem Relation Age of Onset    Hypertension Mother     Hypertension Sister     Diabetes Sister     Hypertension Sister     Hypertension Sister     Hypertension Sister     Hypertension Brother     Hypertension Brother     Hypertension Brother     Diabetes Paternal  Grandmother      Social History     Tobacco Use    Smoking status: Every Day     Packs/day: 0.25     Types: Cigarettes    Smokeless tobacco: Never    Tobacco comments:     Nicotine patches used    Substance Use Topics    Alcohol use: Yes     Comment: 1-2 times weekly    Drug use: Not Currently     Review of Systems   Constitutional:  Negative for fever.   HENT:  Negative for sore throat.    Respiratory:  Negative for shortness of breath.    Cardiovascular:  Negative for chest pain.   Gastrointestinal:  Negative for nausea.   Genitourinary:  Negative for dysuria.   Musculoskeletal:  Negative for back pain.   Skin:  Negative for rash.   Neurological:  Positive for headaches. Negative for weakness.   Hematological:  Does not bruise/bleed easily.     Physical Exam     Initial Vitals [03/08/23 0832]   BP Pulse Resp Temp SpO2   129/79 60 18 97.9 °F (36.6 °C) 96 %      MAP       --         Physical Exam    Nursing note and vitals reviewed.  Constitutional: He appears well-developed and well-nourished. He is not diaphoretic. No distress.   HENT:   Head: Normocephalic and atraumatic.   Right Ear: External ear normal.   Left Ear: External ear normal.   Nose: Nose normal.   Mouth/Throat: Oropharynx is clear and moist. No oropharyngeal exudate.   Eyes: Conjunctivae and EOM are normal. Pupils are equal, round, and reactive to light. Right eye exhibits no discharge. Left eye exhibits no discharge.   Neck: Neck supple. No thyromegaly present. No tracheal deviation present. No JVD present.   Normal range of motion.  Cardiovascular:  Normal rate, regular rhythm, normal heart sounds and intact distal pulses.     Exam reveals no gallop and no friction rub.       No murmur heard.  Pulmonary/Chest: Breath sounds normal. No stridor. No respiratory distress. He has no wheezes. He has no rhonchi. He has no rales. He exhibits no tenderness.   Abdominal: Abdomen is soft. Bowel sounds are normal. He exhibits no distension. There is no  abdominal tenderness. There is no rebound and no guarding.   Musculoskeletal:         General: No tenderness or edema. Normal range of motion.      Cervical back: Normal range of motion and neck supple.     Lymphadenopathy:     He has no cervical adenopathy.   Neurological: He is alert and oriented to person, place, and time. He has normal strength and normal reflexes. No cranial nerve deficit or sensory deficit. GCS score is 15. GCS eye subscore is 4. GCS verbal subscore is 5. GCS motor subscore is 6.   Skin: Skin is warm and dry. No rash and no abscess noted. No erythema.   Psychiatric: He has a normal mood and affect. His behavior is normal. Judgment and thought content normal.       ED Course   Procedures  Labs Reviewed   COMPREHENSIVE METABOLIC PANEL - Abnormal; Notable for the following components:       Result Value    Glucose Level 102 (*)     Globulin 3.8 (*)     All other components within normal limits   CBC WITH DIFFERENTIAL - Abnormal; Notable for the following components:    MCHC 32.6 (*)     All other components within normal limits   CBC W/ AUTO DIFFERENTIAL    Narrative:     The following orders were created for panel order CBC auto differential.  Procedure                               Abnormality         Status                     ---------                               -----------         ------                     CBC with Differential[[id number]]        Abnormal            Final result                 Please view results for these tests on the individual orders.   URINALYSIS, REFLEX TO URINE CULTURE   LAMOTRIGINE LEVEL   LEVETIRACETAM  (KEPPRA) LEVEL   URINALYSIS, MICROSCOPIC          Imaging Results              CT Head Without Contrast (Final result)  Result time 03/08/23 09:45:51      Final result by Jake Ball MD (03/08/23 09:45:51)                   Impression:      No acute intracranial findings or significant interval change compared to last month.      Electronically signed  by: Jake Ball  Date:    03/08/2023  Time:    09:45               Narrative:    EXAMINATION:  CT HEAD WITHOUT CONTRAST    CLINICAL HISTORY:  Dizziness, persistent/recurrent, cardiac or vascular cause suspected;    TECHNIQUE:  CT imaging of the head performed from the skull base to the vertex without intravenous contrast. DLP 1105 mGycm. Automatic exposure control, adjustment of mA/kV or iterative reconstruction technique was used to reduce radiation.    COMPARISON:  21 February 2023    FINDINGS:  There is no acute cortical infarct, hemorrhage or mass lesion.  No new parenchymal attenuation abnormality.  The ventricles are not enlarged.    Visualized paranasal sinuses and mastoid air cells are clear.  There are postsurgical changes of the left orbital floor.                                       X-Ray Chest AP Portable (Final result)  Result time 03/08/23 09:56:32      Final result by Brad Otero MD (03/08/23 09:56:32)                   Impression:      No acute chest disease is identified.      Electronically signed by: Brad Otero  Date:    03/08/2023  Time:    09:56               Narrative:    EXAMINATION:  XR CHEST AP PORTABLE    CLINICAL HISTORY:  Chest Pain;, .    COMPARISON:  January 21, 2023    FINDINGS:  No alveolar consolidation, effusion, or pneumothorax is seen.   The thoracic aorta is normal  cardiac silhouette, central pulmonary vessels and mediastinum are normal in size and are grossly unremarkable.   visualized osseous structures are grossly unremarkable.                                       Medications   sodium chloride 0.9% bolus 500 mL 500 mL (0 mLs Intravenous Stopped 3/8/23 1001)   LORazepam injection 1 mg (1 mg Intravenous Given 3/8/23 0922)   acetaminophen tablet 1,000 mg (1,000 mg Oral Given 3/8/23 1131)     Medical Decision Making:   Differential Diagnosis:   Seizure headache pseudo seizure breakthrough seizure                        Clinical Impression:   Final  diagnoses:  [R07.9] Chest pain  [R56.9] Seizure (Primary)        ED Disposition Condition    Discharge Stable          ED Prescriptions    None       Follow-up Information    None          Trenton Kuhn MD  03/08/23 8336

## 2023-03-08 NOTE — ED NOTES
Pt update don er disposition - awaits urine sample  still.  Instructed pt on mscc ua.  Pt medicated for headache- pivsl rt ac free fo edema or redness- patent.   Gcs 15 - resps even/unlabored- [id number] cm - atrial paced rhythm - side rails up x2 - family at bedside.

## 2023-03-09 LAB
LAMOTRIGINE SERPL-MCNC: <0.2 MCG/ML (ref 3–15)
LEVETIRACETAM SERPL-MCNC: 8.9 MCG/ML (ref 10–40)

## 2023-03-10 ENCOUNTER — APPOINTMENT (OUTPATIENT)
Dept: LAB | Facility: HOSPITAL | Age: 58
End: 2023-03-10
Attending: PSYCHIATRY & NEUROLOGY
Payer: MEDICAID

## 2023-03-10 DIAGNOSIS — Z79.899 ENCOUNTER FOR LONG-TERM (CURRENT) USE OF OTHER MEDICATIONS: Primary | ICD-10-CM

## 2023-03-10 DIAGNOSIS — F31.5 BIPOLAR I DISORDER, MOST RECENT EPISODE (OR CURRENT) DEPRESSED, SEVERE, SPECIFIED AS WITH PSYCHOTIC BEHAVIOR: ICD-10-CM

## 2023-03-10 LAB
CHOLEST SERPL-MCNC: 267 MG/DL
CHOLEST/HDLC SERPL: 9 {RATIO} (ref 0–5)
GLUCOSE SERPL-MCNC: 101 MG/DL (ref 74–100)
HDLC SERPL-MCNC: 30 MG/DL (ref 35–60)
LDLC SERPL CALC-MCNC: 201 MG/DL (ref 50–140)
PROLACTIN LEVEL (OHS): 3.09 NG/ML (ref 3.46–19.4)
TRIGL SERPL-MCNC: 179 MG/DL (ref 34–140)
VLDLC SERPL CALC-MCNC: 36 MG/DL

## 2023-03-10 PROCEDURE — 36415 COLL VENOUS BLD VENIPUNCTURE: CPT

## 2023-03-10 PROCEDURE — 84146 ASSAY OF PROLACTIN: CPT

## 2023-03-10 PROCEDURE — 82947 ASSAY GLUCOSE BLOOD QUANT: CPT

## 2023-03-10 PROCEDURE — 80061 LIPID PANEL: CPT

## 2023-03-12 ENCOUNTER — HOSPITAL ENCOUNTER (EMERGENCY)
Facility: HOSPITAL | Age: 58
Discharge: LEFT BEFORE DISCHARGE | End: 2023-03-12
Payer: MEDICAID

## 2023-03-12 VITALS
DIASTOLIC BLOOD PRESSURE: 104 MMHG | SYSTOLIC BLOOD PRESSURE: 175 MMHG | HEART RATE: 94 BPM | OXYGEN SATURATION: 97 % | RESPIRATION RATE: 20 BRPM | TEMPERATURE: 99 F

## 2023-03-12 DIAGNOSIS — R10.10 UPPER ABDOMINAL PAIN: ICD-10-CM

## 2023-03-12 LAB
ALBUMIN SERPL-MCNC: 4.7 G/DL (ref 3.5–5)
ALBUMIN/GLOB SERPL: 1.2 RATIO (ref 1.1–2)
ALP SERPL-CCNC: 85 UNIT/L (ref 40–150)
ALT SERPL-CCNC: 34 UNIT/L (ref 0–55)
APPEARANCE UR: CLEAR
APTT PPP: 32.8 SECONDS (ref 23.2–33.7)
AST SERPL-CCNC: 26 UNIT/L (ref 5–34)
BACTERIA #/AREA URNS AUTO: NORMAL /HPF
BASOPHILS # BLD AUTO: 0.06 X10(3)/MCL (ref 0–0.2)
BASOPHILS NFR BLD AUTO: 0.8 %
BILIRUB UR QL STRIP.AUTO: ABNORMAL MG/DL
BILIRUBIN DIRECT+TOT PNL SERPL-MCNC: 0.7 MG/DL
BUN SERPL-MCNC: 17.9 MG/DL (ref 8.4–25.7)
CALCIUM SERPL-MCNC: 10.2 MG/DL (ref 8.4–10.2)
CHLORIDE SERPL-SCNC: 106 MMOL/L (ref 98–107)
CO2 SERPL-SCNC: 20 MMOL/L (ref 22–29)
COLOR UR AUTO: ABNORMAL
CREAT SERPL-MCNC: 0.96 MG/DL (ref 0.73–1.18)
EOSINOPHIL # BLD AUTO: 0.15 X10(3)/MCL (ref 0–0.9)
EOSINOPHIL NFR BLD AUTO: 2 %
ERYTHROCYTE [DISTWIDTH] IN BLOOD BY AUTOMATED COUNT: 17.8 % (ref 11.5–17)
GFR SERPLBLD CREATININE-BSD FMLA CKD-EPI: >60 MLS/MIN/1.73/M2
GLOBULIN SER-MCNC: 3.8 GM/DL (ref 2.4–3.5)
GLUCOSE SERPL-MCNC: 125 MG/DL (ref 74–100)
GLUCOSE UR QL STRIP.AUTO: NEGATIVE MG/DL
HCT VFR BLD AUTO: 50.9 % (ref 42–52)
HGB BLD-MCNC: 16.7 G/DL (ref 14–18)
IMM GRANULOCYTES # BLD AUTO: 0.02 X10(3)/MCL (ref 0–0.04)
IMM GRANULOCYTES NFR BLD AUTO: 0.3 %
INR BLD: 0.87 (ref 0–1.3)
KETONES UR QL STRIP.AUTO: ABNORMAL MG/DL
LEUKOCYTE ESTERASE UR QL STRIP.AUTO: NEGATIVE UNIT/L
LIPASE SERPL-CCNC: 14 U/L
LYMPHOCYTES # BLD AUTO: 2.32 X10(3)/MCL (ref 0.6–4.6)
LYMPHOCYTES NFR BLD AUTO: 30.4 %
MCH RBC QN AUTO: 27.4 PG
MCHC RBC AUTO-ENTMCNC: 32.8 G/DL (ref 33–36)
MCV RBC AUTO: 83.6 FL (ref 80–94)
MONOCYTES # BLD AUTO: 0.61 X10(3)/MCL (ref 0.1–1.3)
MONOCYTES NFR BLD AUTO: 8 %
NEUTROPHILS # BLD AUTO: 4.46 X10(3)/MCL (ref 2.1–9.2)
NEUTROPHILS NFR BLD AUTO: 58.5 %
NITRITE UR QL STRIP.AUTO: NEGATIVE
NRBC BLD AUTO-RTO: 0 %
PH UR STRIP.AUTO: 5.5 [PH]
PLATELET # BLD AUTO: 257 X10(3)/MCL (ref 130–400)
PMV BLD AUTO: 10.6 FL (ref 7.4–10.4)
POTASSIUM SERPL-SCNC: 4.2 MMOL/L (ref 3.5–5.1)
PROT SERPL-MCNC: 8.5 GM/DL (ref 6.4–8.3)
PROT UR QL STRIP.AUTO: ABNORMAL MG/DL
PROTHROMBIN TIME: 11.8 SECONDS (ref 12.5–14.5)
RBC # BLD AUTO: 6.09 X10(6)/MCL (ref 4.7–6.1)
RBC #/AREA URNS AUTO: NORMAL /HPF
RBC UR QL AUTO: NEGATIVE UNIT/L
SODIUM SERPL-SCNC: 139 MMOL/L (ref 136–145)
SP GR UR STRIP.AUTO: 1.03 (ref 1–1.03)
SQUAMOUS #/AREA URNS AUTO: NORMAL /HPF
TROPONIN I SERPL-MCNC: <0.01 NG/ML (ref 0–0.04)
UROBILINOGEN UR STRIP-ACNC: 1 MG/DL
WBC # SPEC AUTO: 7.6 X10(3)/MCL (ref 4.5–11.5)
WBC #/AREA URNS AUTO: NORMAL /HPF

## 2023-03-12 PROCEDURE — 93010 EKG 12-LEAD: ICD-10-PCS | Mod: ,,, | Performed by: INTERNAL MEDICINE

## 2023-03-12 PROCEDURE — 80053 COMPREHEN METABOLIC PANEL: CPT | Performed by: NURSE PRACTITIONER

## 2023-03-12 PROCEDURE — 83690 ASSAY OF LIPASE: CPT | Performed by: NURSE PRACTITIONER

## 2023-03-12 PROCEDURE — 85730 THROMBOPLASTIN TIME PARTIAL: CPT | Performed by: NURSE PRACTITIONER

## 2023-03-12 PROCEDURE — 84484 ASSAY OF TROPONIN QUANT: CPT | Performed by: NURSE PRACTITIONER

## 2023-03-12 PROCEDURE — 85025 COMPLETE CBC W/AUTO DIFF WBC: CPT | Performed by: NURSE PRACTITIONER

## 2023-03-12 PROCEDURE — 93005 ELECTROCARDIOGRAM TRACING: CPT

## 2023-03-12 PROCEDURE — 81001 URINALYSIS AUTO W/SCOPE: CPT | Performed by: NURSE PRACTITIONER

## 2023-03-12 PROCEDURE — 85610 PROTHROMBIN TIME: CPT | Performed by: NURSE PRACTITIONER

## 2023-03-12 PROCEDURE — 93010 ELECTROCARDIOGRAM REPORT: CPT | Mod: ,,, | Performed by: INTERNAL MEDICINE

## 2023-03-12 PROCEDURE — 25000003 PHARM REV CODE 250: Performed by: NURSE PRACTITIONER

## 2023-03-12 PROCEDURE — 99900041 HC LEFT WITHOUT BEING SEEN- EMERGENCY

## 2023-03-12 RX ORDER — AMLODIPINE BESYLATE 5 MG/1
10 TABLET ORAL
Status: DISCONTINUED | OUTPATIENT
Start: 2023-03-12 | End: 2023-03-12 | Stop reason: HOSPADM

## 2023-03-12 RX ORDER — CLONIDINE HYDROCHLORIDE 0.1 MG/1
0.1 TABLET ORAL
Status: COMPLETED | OUTPATIENT
Start: 2023-03-12 | End: 2023-03-12

## 2023-03-12 RX ADMIN — CLONIDINE HYDROCHLORIDE 0.1 MG: 0.1 TABLET ORAL at 11:03

## 2023-03-12 NOTE — FIRST PROVIDER EVALUATION
Medical screening examination initiated.  I have conducted a focused provider triage encounter, findings are as follows:    Brief history of present illness:  57 y/o male presents with upper abdominal pain, sweating, head pain. Didn't take meds today.     There were no vitals filed for this visit.    Pertinent physical exam:  alert, in wheel chair, nonlabored, hypertensive in triage    Brief workup plan:  ekg, labs, urine, imaging    Preliminary workup initiated; this workup will be continued and followed by the physician or advanced practice provider that is assigned to the patient when roomed.

## 2023-03-13 LAB — POCT GLUCOSE: 113 MG/DL (ref 70–110)

## 2023-04-06 DIAGNOSIS — F32.A DEPRESSIVE DISORDER: ICD-10-CM

## 2023-04-06 DIAGNOSIS — I10 PRIMARY HYPERTENSION: ICD-10-CM

## 2023-04-06 DIAGNOSIS — M75.100 TEAR OF ROTATOR CUFF, UNSPECIFIED LATERALITY, UNSPECIFIED TEAR EXTENT, UNSPECIFIED WHETHER TRAUMATIC: ICD-10-CM

## 2023-04-06 DIAGNOSIS — M79.7 FIBROMYALGIA: ICD-10-CM

## 2023-04-06 DIAGNOSIS — F51.01 PRIMARY INSOMNIA: ICD-10-CM

## 2023-04-06 DIAGNOSIS — M35.9 UNDIFFERENTIATED CONNECTIVE TISSUE DISEASE: ICD-10-CM

## 2023-04-06 RX ORDER — HYDROCODONE BITARTRATE AND ACETAMINOPHEN 10; 325 MG/1; MG/1
TABLET ORAL
Qty: 90 TABLET | Refills: 0 | Status: SHIPPED | OUTPATIENT
Start: 2023-04-06 | End: 2023-05-08

## 2023-04-11 RX ORDER — ACETAMINOPHEN AND CODEINE PHOSPHATE 300; 30 MG/1; MG/1
TABLET ORAL
Qty: 90 TABLET | Refills: 0 | Status: SHIPPED | OUTPATIENT
Start: 2023-04-11 | End: 2023-04-12

## 2023-04-12 ENCOUNTER — TELEPHONE (OUTPATIENT)
Dept: RHEUMATOLOGY | Facility: CLINIC | Age: 58
End: 2023-04-12
Payer: MEDICAID

## 2023-04-12 NOTE — TELEPHONE ENCOUNTER
Chelsea hernandez from [facility] wondering if the patient is supposed to be taking tylenol 3 and norco together. [phone]

## 2023-04-14 DIAGNOSIS — I10 PRIMARY HYPERTENSION: ICD-10-CM

## 2023-04-14 DIAGNOSIS — F51.01 PRIMARY INSOMNIA: ICD-10-CM

## 2023-04-14 DIAGNOSIS — M35.9 UNDIFFERENTIATED CONNECTIVE TISSUE DISEASE: ICD-10-CM

## 2023-04-14 DIAGNOSIS — M79.7 FIBROMYALGIA: ICD-10-CM

## 2023-04-14 DIAGNOSIS — F32.A DEPRESSIVE DISORDER: ICD-10-CM

## 2023-04-14 DIAGNOSIS — M75.100 TEAR OF ROTATOR CUFF, UNSPECIFIED LATERALITY, UNSPECIFIED TEAR EXTENT, UNSPECIFIED WHETHER TRAUMATIC: ICD-10-CM

## 2023-04-14 RX ORDER — ALPRAZOLAM 0.5 MG/1
TABLET ORAL
Qty: 60 TABLET | Refills: 5 | Status: SHIPPED | OUTPATIENT
Start: 2023-04-14

## 2023-04-24 ENCOUNTER — TELEPHONE (OUTPATIENT)
Dept: RHEUMATOLOGY | Facility: CLINIC | Age: 58
End: 2023-04-24
Payer: MEDICAID

## 2023-04-24 DIAGNOSIS — M79.7 FIBROMYALGIA: Primary | ICD-10-CM

## 2023-04-24 RX ORDER — IBUPROFEN 800 MG/1
800 TABLET ORAL EVERY 6 HOURS PRN
Qty: 30 TABLET | Refills: 1 | Status: CANCELLED | OUTPATIENT
Start: 2023-04-24

## 2023-04-24 NOTE — TELEPHONE ENCOUNTER
----- Message from Regina Sepulveda sent at 4/24/2023  8:53 AM CDT -----  Regarding: Medication Refill  Patient called requesting refill for Tylenol 3. [facility] in [place].

## 2023-05-08 DIAGNOSIS — F32.A DEPRESSIVE DISORDER: ICD-10-CM

## 2023-05-08 DIAGNOSIS — I10 PRIMARY HYPERTENSION: ICD-10-CM

## 2023-05-08 DIAGNOSIS — M75.100 TEAR OF ROTATOR CUFF, UNSPECIFIED LATERALITY, UNSPECIFIED TEAR EXTENT, UNSPECIFIED WHETHER TRAUMATIC: ICD-10-CM

## 2023-05-08 DIAGNOSIS — M35.9 UNDIFFERENTIATED CONNECTIVE TISSUE DISEASE: ICD-10-CM

## 2023-05-08 DIAGNOSIS — M79.7 FIBROMYALGIA: ICD-10-CM

## 2023-05-08 DIAGNOSIS — F51.01 PRIMARY INSOMNIA: ICD-10-CM

## 2023-05-08 RX ORDER — HYDROCODONE BITARTRATE AND ACETAMINOPHEN 10; 325 MG/1; MG/1
TABLET ORAL
Qty: 90 TABLET | Refills: 0 | Status: SHIPPED | OUTPATIENT
Start: 2023-05-08 | End: 2023-05-08 | Stop reason: SDUPTHER

## 2023-05-08 RX ORDER — HYDROCODONE BITARTRATE AND ACETAMINOPHEN 10; 325 MG/1; MG/1
TABLET ORAL
Qty: 90 TABLET | Refills: 0 | Status: SHIPPED | OUTPATIENT
Start: 2023-05-08 | End: 2023-06-08 | Stop reason: SDUPTHER

## 2023-05-08 NOTE — TELEPHONE ENCOUNTER
----- Message from Regina Sepulveda sent at 5/8/2023  1:44 PM CDT -----  Regarding: Medication clarification  [facility] in [place] called stating patient's Norco script need to specify medically needy and greater than 7 days. [phone].

## 2023-05-11 DIAGNOSIS — M25.562 CHRONIC PAIN OF LEFT KNEE: Primary | ICD-10-CM

## 2023-05-11 DIAGNOSIS — G89.29 CHRONIC PAIN OF LEFT KNEE: Primary | ICD-10-CM

## 2023-05-25 ENCOUNTER — OFFICE VISIT (OUTPATIENT)
Dept: RHEUMATOLOGY | Facility: CLINIC | Age: 58
End: 2023-05-25
Payer: MEDICAID

## 2023-05-25 VITALS
DIASTOLIC BLOOD PRESSURE: 88 MMHG | WEIGHT: 241.19 LBS | TEMPERATURE: 99 F | SYSTOLIC BLOOD PRESSURE: 130 MMHG | BODY MASS INDEX: 38.76 KG/M2 | HEART RATE: 78 BPM | HEIGHT: 66 IN | OXYGEN SATURATION: 98 %

## 2023-05-25 DIAGNOSIS — M75.100 TEAR OF ROTATOR CUFF, UNSPECIFIED LATERALITY, UNSPECIFIED TEAR EXTENT, UNSPECIFIED WHETHER TRAUMATIC: ICD-10-CM

## 2023-05-25 DIAGNOSIS — M35.9 UNDIFFERENTIATED CONNECTIVE TISSUE DISEASE: Primary | ICD-10-CM

## 2023-05-25 DIAGNOSIS — M79.7 FIBROMYALGIA: ICD-10-CM

## 2023-05-25 DIAGNOSIS — F51.01 PRIMARY INSOMNIA: ICD-10-CM

## 2023-05-25 DIAGNOSIS — I10 PRIMARY HYPERTENSION: ICD-10-CM

## 2023-05-25 DIAGNOSIS — M75.101 TEAR OF RIGHT ROTATOR CUFF, UNSPECIFIED TEAR EXTENT, UNSPECIFIED WHETHER TRAUMATIC: ICD-10-CM

## 2023-05-25 DIAGNOSIS — F32.A DEPRESSIVE DISORDER: ICD-10-CM

## 2023-05-25 PROCEDURE — 99214 PR OFFICE/OUTPT VISIT, EST, LEVL IV, 30-39 MIN: ICD-10-PCS | Mod: S$PBB,,, | Performed by: INTERNAL MEDICINE

## 2023-05-25 PROCEDURE — 4010F ACE/ARB THERAPY RXD/TAKEN: CPT | Mod: CPTII,,, | Performed by: INTERNAL MEDICINE

## 2023-05-25 PROCEDURE — 1159F PR MEDICATION LIST DOCUMENTED IN MEDICAL RECORD: ICD-10-PCS | Mod: CPTII,,, | Performed by: INTERNAL MEDICINE

## 2023-05-25 PROCEDURE — 3075F PR MOST RECENT SYSTOLIC BLOOD PRESS GE 130-139MM HG: ICD-10-PCS | Mod: CPTII,,, | Performed by: INTERNAL MEDICINE

## 2023-05-25 PROCEDURE — 99999 PR PBB SHADOW E&M-EST. PATIENT-LVL V: ICD-10-PCS | Mod: PBBFAC,,, | Performed by: INTERNAL MEDICINE

## 2023-05-25 PROCEDURE — 3075F SYST BP GE 130 - 139MM HG: CPT | Mod: CPTII,,, | Performed by: INTERNAL MEDICINE

## 2023-05-25 PROCEDURE — 99999 PR PBB SHADOW E&M-EST. PATIENT-LVL V: CPT | Mod: PBBFAC,,, | Performed by: INTERNAL MEDICINE

## 2023-05-25 PROCEDURE — 3008F PR BODY MASS INDEX (BMI) DOCUMENTED: ICD-10-PCS | Mod: CPTII,,, | Performed by: INTERNAL MEDICINE

## 2023-05-25 PROCEDURE — 4010F PR ACE/ARB THEARPY RXD/TAKEN: ICD-10-PCS | Mod: CPTII,,, | Performed by: INTERNAL MEDICINE

## 2023-05-25 PROCEDURE — 3079F DIAST BP 80-89 MM HG: CPT | Mod: CPTII,,, | Performed by: INTERNAL MEDICINE

## 2023-05-25 PROCEDURE — 1159F MED LIST DOCD IN RCRD: CPT | Mod: CPTII,,, | Performed by: INTERNAL MEDICINE

## 2023-05-25 PROCEDURE — 3079F PR MOST RECENT DIASTOLIC BLOOD PRESSURE 80-89 MM HG: ICD-10-PCS | Mod: CPTII,,, | Performed by: INTERNAL MEDICINE

## 2023-05-25 PROCEDURE — 99215 OFFICE O/P EST HI 40 MIN: CPT | Mod: PBBFAC | Performed by: INTERNAL MEDICINE

## 2023-05-25 PROCEDURE — 99214 OFFICE O/P EST MOD 30 MIN: CPT | Mod: S$PBB,,, | Performed by: INTERNAL MEDICINE

## 2023-05-25 PROCEDURE — 3008F BODY MASS INDEX DOCD: CPT | Mod: CPTII,,, | Performed by: INTERNAL MEDICINE

## 2023-05-25 RX ORDER — DICLOFENAC SODIUM 50 MG/1
50 TABLET, DELAYED RELEASE ORAL 2 TIMES DAILY PRN
Qty: 60 TABLET | Refills: 5 | Status: SHIPPED | OUTPATIENT
Start: 2023-05-25 | End: 2023-09-28 | Stop reason: SDUPTHER

## 2023-05-25 RX ORDER — ASPIRIN 325 MG
50000 TABLET, DELAYED RELEASE (ENTERIC COATED) ORAL
Qty: 5 CAPSULE | Refills: 5 | Status: SHIPPED | OUTPATIENT
Start: 2023-05-25 | End: 2023-09-28 | Stop reason: SDUPTHER

## 2023-05-25 RX ORDER — FOLIC ACID 1 MG/1
1 TABLET ORAL DAILY
Qty: 30 TABLET | Refills: 5 | Status: SHIPPED | OUTPATIENT
Start: 2023-05-25 | End: 2023-09-28 | Stop reason: SDUPTHER

## 2023-05-25 RX ORDER — HYDROXYCHLOROQUINE SULFATE 200 MG/1
200 TABLET, FILM COATED ORAL 2 TIMES DAILY
Qty: 60 TABLET | Refills: 5 | Status: SHIPPED | OUTPATIENT
Start: 2023-05-25 | End: 2023-09-28 | Stop reason: SDUPTHER

## 2023-05-25 RX ORDER — PREGABALIN 100 MG/1
100 CAPSULE ORAL 3 TIMES DAILY
Qty: 90 CAPSULE | Refills: 5 | Status: SHIPPED | OUTPATIENT
Start: 2023-05-25 | End: 2023-09-28 | Stop reason: SDUPTHER

## 2023-05-25 RX ORDER — METHOTREXATE 2.5 MG/1
10 TABLET ORAL
Qty: 20 TABLET | Refills: 5 | Status: SHIPPED | OUTPATIENT
Start: 2023-05-25 | End: 2023-09-28 | Stop reason: SDUPTHER

## 2023-05-25 RX ORDER — CYCLOBENZAPRINE HCL 10 MG
10 TABLET ORAL 3 TIMES DAILY
Qty: 60 TABLET | Refills: 5 | Status: SHIPPED | OUTPATIENT
Start: 2023-05-25 | End: 2023-09-28 | Stop reason: SDUPTHER

## 2023-05-25 RX ORDER — ESCITALOPRAM OXALATE 20 MG/1
10 TABLET ORAL DAILY
Qty: 30 TABLET | Refills: 5 | Status: SHIPPED | OUTPATIENT
Start: 2023-05-25

## 2023-05-25 NOTE — PROGRESS NOTES
"Subjective:       Patient ID: Darius Restrepo is a 58 y.o. male.    Chief Complaint: Follow-up (Follow up. Patient complains of joint pain. Pain 7/10)    The patient is complaining of joint pain involving the MCP PIP wrist elbow shoulders hips knees and ankles bilaterally.  The pain is 4/10 in intensity dull in quality and continuous.  That is associated with a morning stiffness lasting for more than 60 minutes.  Is also having difficulty maintaining a good night of sleep.  This has been associated with myalgias.  Muscle aches are 4/10 in intensity dull in quality and continuous.  They are associated with fatigue.  No fever no chills no others.  Labs checked during this visit       Review of Systems   Constitutional:  Negative for appetite change, chills and fever.   HENT:  Negative for congestion, ear pain, mouth sores, nosebleeds and trouble swallowing.    Eyes:  Negative for photophobia and discharge.   Respiratory:  Negative for chest tightness and shortness of breath.    Cardiovascular:  Negative for chest pain.   Gastrointestinal:  Negative for abdominal pain and vomiting.   Endocrine: Negative.    Genitourinary:  Negative for hematuria.   Musculoskeletal:         As per HPI   Skin:  Negative for rash.   Neurological:  Negative for weakness.       Objective:   /88 (BP Location: Left arm, Patient Position: Sitting, BP Method: Large (Automatic))   Pulse 78   Temp 98.6 °F (37 °C) (Oral)   Ht 5' 6" (1.676 m)   Wt 109.4 kg (241 lb 3.2 oz)   SpO2 98%   BMI 38.93 kg/m²      Physical Exam   Constitutional: He is oriented to person, place, and time. He appears well-developed and well-nourished. No distress.   HENT:   Head: Normocephalic and atraumatic.   Right Ear: External ear normal.   Left Ear: External ear normal.   Eyes: Pupils are equal, round, and reactive to light.   Cardiovascular: Normal rate, regular rhythm and normal heart sounds.   Pulmonary/Chest: Breath sounds normal.   Abdominal: Soft. There " is no abdominal tenderness.   Musculoskeletal:      Right shoulder: Tenderness present.      Left shoulder: Tenderness present.      Right elbow: Tenderness present.      Left elbow: Tenderness present.      Right wrist: Tenderness present.      Left wrist: Tenderness present.      Cervical back: Neck supple.      Right hip: Tenderness present.      Left hip: Tenderness present.      Right knee: Tenderness present.      Left knee: Tenderness present.      Right ankle: Tenderness present.      Left ankle: Tenderness present.   Lymphadenopathy:     He has no cervical adenopathy.   Neurological: He is alert and oriented to person, place, and time. He displays normal reflexes. No cranial nerve deficit or sensory deficit. He exhibits normal muscle tone. Coordination normal.   Skin: No rash noted. No erythema.   Vitals reviewed.      Right Side Rheumatological Exam     The patient is tender to palpation of the shoulder, elbow, wrist, knee, 1st PIP, 1st MCP, 2nd PIP, 2nd MCP, 3rd PIP, 3rd MCP, 4th PIP, 4th MCP, 5th PIP, hip, ankle, 1st MTP, 2nd MTP, 3rd MTP, 4th MTP, 5th MTP, 1st toe IP, 2nd toe IP, 3rd toe IP, 4th toe IP and 5th toe IP    Left Side Rheumatological Exam     The patient is tender to palpation of the shoulder, elbow, wrist, knee, 1st PIP, 1st MCP, 2nd PIP, 2nd MCP, 3rd PIP, 3rd MCP, 4th PIP, 4th MCP, 5th PIP, 5th MCP, hip, ankle, 1st MTP, 2nd MTP, 3rd MTP, 4th MTP, 5th MTP, 1st toe IP, 2nd toe IP, 3rd toe IP, 4th toe IP and 5th toe IP.       Completed Fibromyalgia exam 18/18 tender points.  No data to display     Assessment:       1. Undifferentiated connective tissue disease    2. Primary hypertension    3. Fibromyalgia    4. Primary insomnia    5. Depressive disorder    6. Tear of right rotator cuff, unspecified tear extent, unspecified whether traumatic    7. Tear of rotator cuff, unspecified laterality, unspecified tear extent, unspecified whether traumatic          Medication List with Changes/Refills    Current Medications    ALPRAZOLAM (XANAX) 0.5 MG TABLET    TAKE ONE TABLET BY MOUTH TWICE A DAY AS NEEDED FOR ANXIETY       Start Date: 4/14/2023 End Date: --    AMLODIPINE (NORVASC) 10 MG TABLET    Take 10 mg by mouth once daily.       Start Date: 1/26/2023 End Date: --    ATORVASTATIN (LIPITOR) 20 MG TABLET    Take 20 mg by mouth once daily.       Start Date: 5/19/2022 End Date: --    BRIVIACT 100 MG TAB    Take 100 mg by mouth 2 (two) times daily.       Start Date: 12/7/2022 End Date: --    CARVEDILOL (COREG) 12.5 MG TABLET    Take 12.5 mg by mouth 2 (two) times daily.       Start Date: 2/21/2023 End Date: --    CLONIDINE 0.3 MG/24 HR TD PTWK (CATAPRES) 0.3 MG/24 HR    Place 1 patch onto the skin every 7 days.       Start Date: 5/25/2022 End Date: --    EPINEPHRINE (EPIPEN) 0.3 MG/0.3 ML ATIN    Inject 0.3 mLs (0.3 mg total) into the muscle as needed (allergic reaction).       Start Date: 7/5/2022  End Date: 7/5/2023    HYDROCHLOROTHIAZIDE (HYDRODIURIL) 12.5 MG TAB    Take 12.5 mg by mouth once daily.       Start Date: --        End Date: --    HYDROCODONE-ACETAMINOPHEN (NORCO)  MG PER TABLET    TAKE ONE TABLET BY MOUTH EVERY 8 HOURS AS NEEDED FOR PAIN       Start Date: 5/8/2023  End Date: --    LAMOTRIGINE (LAMICTAL) 200 MG TABLET    Take 200 mg by mouth 2 (two) times daily.       Start Date: 1/26/2023 End Date: --    LEVETIRACETAM (KEPPRA) 1000 MG TABLET    Take 1,000 mg by mouth 3 (three) times daily.       Start Date: --        End Date: --    LOSARTAN (COZAAR) 50 MG TABLET    Take 50 mg by mouth once daily.       Start Date: --        End Date: --    MUPIROCIN (BACTROBAN) 2 % OINTMENT    Apply topically 3 (three) times daily.       Start Date: 12/14/2022End Date: --    RISPERIDONE (RISPERDAL) 1 MG TABLET    Take 1 mg by mouth 2 (two) times daily.       Start Date: 1/5/2023  End Date: --    SERTRALINE (ZOLOFT) 100 MG TABLET    Take 100 mg by mouth once daily.       Start Date: 1/18/2023 End Date: --    Changed and/or Refilled Medications    Modified Medication Previous Medication    CHOLECALCIFEROL, VITAMIN D3, 1,250 MCG (50,000 UNIT) CAPSULE cholecalciferol, vitamin D3, 1,250 mcg (50,000 unit) capsule       Take 1 capsule (50,000 Units total) by mouth every 7 days.    Take 1 capsule (50,000 Units total) by mouth every 7 days.       Start Date: 5/25/2023 End Date: --    Start Date: 1/30/2023 End Date: 5/25/2023    CYCLOBENZAPRINE (FLEXERIL) 10 MG TABLET cyclobenzaprine (FLEXERIL) 10 MG tablet       Take 1 tablet (10 mg total) by mouth 3 (three) times daily.    Take 10 mg by mouth 3 (three) times daily.       Start Date: 5/25/2023 End Date: --    Start Date: --        End Date: 5/25/2023    DICLOFENAC (VOLTAREN) 50 MG EC TABLET diclofenac (VOLTAREN) 50 MG EC tablet       Take 1 tablet (50 mg total) by mouth 2 (two) times daily as needed (pain, after food).    Take 50 mg by mouth 2 (two) times daily.       Start Date: 5/25/2023 End Date: --    Start Date: 2/5/2023  End Date: 5/25/2023    ESCITALOPRAM OXALATE (LEXAPRO) 20 MG TABLET EScitalopram oxalate (LEXAPRO) 20 MG tablet       Take 0.5 tablets (10 mg total) by mouth once daily. After breakfast    Take 0.5 tablets (10 mg total) by mouth once daily. After breakfast       Start Date: 5/25/2023 End Date: --    Start Date: 9/16/2022 End Date: 5/25/2023    FOLIC ACID (FOLVITE) 1 MG TABLET folic acid (FOLVITE) 1 MG tablet       Take 1 tablet (1 mg total) by mouth once daily. After food    Take 1 tablet (1 mg total) by mouth once daily. After food       Start Date: 5/25/2023 End Date: 11/21/2023    Start Date: 1/30/2023 End Date: 5/25/2023    HYDROXYCHLOROQUINE (PLAQUENIL) 200 MG TABLET hydrOXYchloroQUINE (PLAQUENIL) 200 mg tablet       Take 1 tablet (200 mg total) by mouth 2 (two) times daily.    Take 1 tablet (200 mg total) by mouth 2 (two) times daily.       Start Date: 5/25/2023 End Date: --    Start Date: 1/30/2023 End Date: 5/25/2023    METHOTREXATE 2.5 MG TAB  methotrexate 2.5 MG Tab       Take 4 tablets (10 mg total) by mouth every 7 days. EVERY        TAKE 4 TAB TOGETHER AFTER SUPPER    Take 4 tablets (10 mg total) by mouth every 7 days. EVERY        TAKE 4 TAB TOGETHER AFTER SUPPER       Start Date: 5/25/2023 End Date: 11/21/2023    Start Date: 1/30/2023 End Date: 5/25/2023    PREGABALIN (LYRICA) 100 MG CAPSULE pregabalin (LYRICA) 100 MG capsule       Take 1 capsule (100 mg total) by mouth 3 (three) times daily.    Take 1 capsule (100 mg total) by mouth 3 (three) times daily.       Start Date: 5/25/2023 End Date: --    Start Date: 1/30/2023 End Date: 5/25/2023   Discontinued Medications    IBUPROFEN (ADVIL,MOTRIN) 800 MG TABLET    Take 1 tablet (800 mg total) by mouth every 6 (six) hours as needed for Pain.       Start Date: 12/2/2022 End Date: 5/25/2023    QUETIAPINE (SEROQUEL) 300 MG TAB    Take 300 mg by mouth nightly.       Start Date: 5/25/2022 End Date: 5/25/2023         Plan:         Problem List Items Addressed This Visit          Psychiatric    Depressive disorder    Relevant Medications    cholecalciferol, vitamin D3, 1,250 mcg (50,000 unit) capsule    cyclobenzaprine (FLEXERIL) 10 MG tablet    diclofenac (VOLTAREN) 50 MG EC tablet    EScitalopram oxalate (LEXAPRO) 20 MG tablet    folic acid (FOLVITE) 1 MG tablet    hydrOXYchloroQUINE (PLAQUENIL) 200 mg tablet    methotrexate 2.5 MG Tab    pregabalin (LYRICA) 100 MG capsule    Other Relevant Orders    Ambulatory referral/consult to Orthopedics       Cardiac/Vascular    Hypertension    Relevant Medications    cholecalciferol, vitamin D3, 1,250 mcg (50,000 unit) capsule    cyclobenzaprine (FLEXERIL) 10 MG tablet    diclofenac (VOLTAREN) 50 MG EC tablet    EScitalopram oxalate (LEXAPRO) 20 MG tablet    folic acid (FOLVITE) 1 MG tablet    hydrOXYchloroQUINE (PLAQUENIL) 200 mg tablet    methotrexate 2.5 MG Tab    pregabalin (LYRICA) 100 MG capsule    Other Relevant Orders    Ambulatory referral/consult to  Orthopedics       Immunology/Multi System    Undifferentiated connective tissue disease - Primary    Relevant Medications    cholecalciferol, vitamin D3, 1,250 mcg (50,000 unit) capsule    cyclobenzaprine (FLEXERIL) 10 MG tablet    diclofenac (VOLTAREN) 50 MG EC tablet    EScitalopram oxalate (LEXAPRO) 20 MG tablet    folic acid (FOLVITE) 1 MG tablet    hydrOXYchloroQUINE (PLAQUENIL) 200 mg tablet    methotrexate 2.5 MG Tab    pregabalin (LYRICA) 100 MG capsule    Other Relevant Orders    Ambulatory referral/consult to Orthopedics       Orthopedic    Fibromyalgia    Relevant Medications    cholecalciferol, vitamin D3, 1,250 mcg (50,000 unit) capsule    cyclobenzaprine (FLEXERIL) 10 MG tablet    diclofenac (VOLTAREN) 50 MG EC tablet    EScitalopram oxalate (LEXAPRO) 20 MG tablet    folic acid (FOLVITE) 1 MG tablet    hydrOXYchloroQUINE (PLAQUENIL) 200 mg tablet    methotrexate 2.5 MG Tab    pregabalin (LYRICA) 100 MG capsule    Other Relevant Orders    Ambulatory referral/consult to Orthopedics    Tear of rotator cuff    Relevant Medications    cholecalciferol, vitamin D3, 1,250 mcg (50,000 unit) capsule    cyclobenzaprine (FLEXERIL) 10 MG tablet    diclofenac (VOLTAREN) 50 MG EC tablet    EScitalopram oxalate (LEXAPRO) 20 MG tablet    folic acid (FOLVITE) 1 MG tablet    hydrOXYchloroQUINE (PLAQUENIL) 200 mg tablet    methotrexate 2.5 MG Tab    pregabalin (LYRICA) 100 MG capsule    Other Relevant Orders    Ambulatory referral/consult to Orthopedics    Ambulatory referral/consult to Orthopedics       Other    Insomnia    Relevant Medications    cholecalciferol, vitamin D3, 1,250 mcg (50,000 unit) capsule    cyclobenzaprine (FLEXERIL) 10 MG tablet    diclofenac (VOLTAREN) 50 MG EC tablet    EScitalopram oxalate (LEXAPRO) 20 MG tablet    folic acid (FOLVITE) 1 MG tablet    hydrOXYchloroQUINE (PLAQUENIL) 200 mg tablet    methotrexate 2.5 MG Tab    pregabalin (LYRICA) 100 MG capsule    Other Relevant Orders    Ambulatory  referral/consult to Orthopedics

## 2023-06-05 ENCOUNTER — HOSPITAL ENCOUNTER (OUTPATIENT)
Dept: RADIOLOGY | Facility: HOSPITAL | Age: 58
Discharge: HOME | End: 2023-06-05
Attending: STUDENT IN AN ORGANIZED HEALTH CARE EDUCATION/TRAINING PROGRAM
Payer: MEDICAID

## 2023-06-05 ENCOUNTER — OFFICE VISIT (OUTPATIENT)
Dept: ORTHOPEDICS | Facility: CLINIC | Age: 58
End: 2023-06-05
Payer: MEDICAID

## 2023-06-05 VITALS — RESPIRATION RATE: 20 BRPM | WEIGHT: 250 LBS | BODY MASS INDEX: 40.18 KG/M2 | HEIGHT: 66 IN

## 2023-06-05 DIAGNOSIS — M75.101 TEAR OF RIGHT ROTATOR CUFF, UNSPECIFIED TEAR EXTENT, UNSPECIFIED WHETHER TRAUMATIC: ICD-10-CM

## 2023-06-05 DIAGNOSIS — G89.29 CHRONIC PAIN OF LEFT KNEE: Primary | ICD-10-CM

## 2023-06-05 DIAGNOSIS — M75.100 TEAR OF ROTATOR CUFF, UNSPECIFIED LATERALITY, UNSPECIFIED TEAR EXTENT, UNSPECIFIED WHETHER TRAUMATIC: ICD-10-CM

## 2023-06-05 DIAGNOSIS — M25.562 CHRONIC PAIN OF LEFT KNEE: ICD-10-CM

## 2023-06-05 DIAGNOSIS — F51.01 PRIMARY INSOMNIA: ICD-10-CM

## 2023-06-05 DIAGNOSIS — G89.29 CHRONIC PAIN OF LEFT KNEE: ICD-10-CM

## 2023-06-05 DIAGNOSIS — I10 PRIMARY HYPERTENSION: ICD-10-CM

## 2023-06-05 DIAGNOSIS — M25.562 CHRONIC PAIN OF LEFT KNEE: Primary | ICD-10-CM

## 2023-06-05 DIAGNOSIS — M35.9 UNDIFFERENTIATED CONNECTIVE TISSUE DISEASE: ICD-10-CM

## 2023-06-05 DIAGNOSIS — F32.A DEPRESSIVE DISORDER: ICD-10-CM

## 2023-06-05 DIAGNOSIS — M79.7 FIBROMYALGIA: ICD-10-CM

## 2023-06-05 PROCEDURE — 73564 X-RAY EXAM KNEE 4 OR MORE: CPT | Mod: TC,LT

## 2023-06-05 PROCEDURE — 99213 OFFICE O/P EST LOW 20 MIN: CPT | Mod: PBBFAC

## 2023-06-05 PROCEDURE — 20610 DRAIN/INJ JOINT/BURSA W/O US: CPT | Mod: PBBFAC | Performed by: STUDENT IN AN ORGANIZED HEALTH CARE EDUCATION/TRAINING PROGRAM

## 2023-06-05 RX ORDER — LIDOCAINE HYDROCHLORIDE 10 MG/ML
5 INJECTION, SOLUTION EPIDURAL; INFILTRATION; INTRACAUDAL; PERINEURAL
Status: COMPLETED | OUTPATIENT
Start: 2023-06-05 | End: 2023-06-05

## 2023-06-05 RX ORDER — TRIAMCINOLONE ACETONIDE 40 MG/ML
40 INJECTION, SUSPENSION INTRA-ARTICULAR; INTRAMUSCULAR ONCE
Status: COMPLETED | OUTPATIENT
Start: 2023-06-05 | End: 2023-06-05

## 2023-06-05 RX ADMIN — LIDOCAINE HYDROCHLORIDE 50 MG: 10 INJECTION, SOLUTION EPIDURAL; INFILTRATION; INTRACAUDAL; PERINEURAL at 09:06

## 2023-06-05 RX ADMIN — TRIAMCINOLONE ACETONIDE 40 MG: 40 INJECTION, SUSPENSION INTRA-ARTICULAR; INTRAMUSCULAR at 09:06

## 2023-06-05 NOTE — PROGRESS NOTES
Large Joint Aspiration/Injection: L knee    Date/Time: 6/5/2023 8:50 AM  Performed by: Stan Lucas MD  Authorized by: Stan Lucas MD     Consent Done?:  Yes (Written)  Indications:  Arthritis  Site marked: the procedure site was marked    Timeout: prior to procedure the correct patient, procedure, and site was verified    Prep: patient was prepped and draped in usual sterile fashion      Local anesthesia used?: Yes    Local anesthetic:  Topical anesthetic    Details:  Needle Size:  21 G  Location:  Knee  Site:  L knee  Patient tolerance:  Patient tolerated the procedure well with no immediate complications     Staff: >francisco    Risks:  Possible complications with the injection include bleeding, infection (.01%), tendon rupture, steroid flare, fat pad or soft tissue atrophy, skin depigmentation, allergic reaction to medications and vasovagal response. (steroid flare treatment is rest, ice, NSAIDs and resolves in 24-36 hours.)    Consent:  No absolute contraindications (cellulitis overlying joint, infection, lack of informed consent, allergy to injection medication, AVN protein or egg allergy for sodium hyaluronate, or history of steroid flare) or relative contraindications (uncontrolled DM2 A1c>10, coagulopathy, INR > 3.5, previous joint replacement or history of AVN).        Description:  The patient was prepped in normal sterile fashion use of chlorhexidine scrub and the appropriate and anatomic landmarks were identified with ultrasound.  Contents of syringe included: 5cc of 1% of lidocaine with 40mg of Kenalog     Post Procedure: Patient alert, and moving all extremities. ROM improved, pain decreased to 0/10 post procedure.  Good peripheral pulses, no signs of vascular compromise and range of motion intact.  Aftercare instructions were given to patient at time of discharge.  Relative rest for 3 days-avoiding excess activity.  Place ice on the area for 15 minutes every 4-6 hours. Patient may take  Tylenol a 1000 mg b.i.d. or ibuprofen 600 mg t.i.d. for the next 3-4 days if not on medication already and safe to take pending co-morbidities.  Protect the area for the next 1-8 hours if anesthetic was used.  Avoid excessive activity for the next 3-4 weeks.  ER precautions given for fever, severe joint pain or allergic reaction or other new symptoms related to the joint injection.         This note is dictated using the M*Modal Fluency Direct word recognition program. There are word recognition mistakes that are occasionally missed on review.    Stan Lucas MD  Sports Medicine Fellow

## 2023-06-05 NOTE — PROGRESS NOTES
Faculty Attestation: Darius Restrepo  was seen at [facility] in the Orthopaedic Clinic. Patient chart reviewed. History of Present Illness, Physical Exam, and Assessment and Plan reviewed. Treatment plan is reasonable and appropriate. Compliance with treatment recommendations is important. No procedure was performed.     Jeremy Pennington MD  Orthopaedic Surgery

## 2023-06-05 NOTE — PROGRESS NOTES
[facility] Orthopaedic Surgery H&P Note  In brief, Darius Restrepo is a 58 y.o. male coming in with left knee pain.    HPI:  Patient has a history of seizures and in February he had a fall landing on his left knee.  He previously was ambulating without assistive devices.  He did have a history of a stroke where he had some right-sided weakness but this was no longer causing him any ambulation issues.  After this fall his left knee caused him enough pain that he has been getting around with a walker and a cane.  It seems to be getting a little bit better but he continues to have pain.  It hurts mainly anteriorly and anteromedial.  No prior left knee pain.  PMH:   Past Medical History:   Diagnosis Date    CVA (cerebral vascular accident)     Epilepsy     GERD (gastroesophageal reflux disease)     Hyperlipidemia     Hypertension     KRISTIN on CPAP     TIA (transient ischemic attack)      PSH:   Past Surgical History:   Procedure Laterality Date    A-V CARDIAC PACEMAKER INSERTION N/A 12/02/2022    Procedure: INSERTION, CARDIAC PACEMAKER, DUAL CHAMBER;  Surgeon: Hemant Maravilla DO;  Location: [facility] CATH LAB;  Service: Cardiology;  Laterality: N/A;  EXPLANT OF EVENT RECORDER /  IMPLANT OF DUAL CHAMBER PPM (SJM)    HIP SURGERY Left     Marito inserted from knee to hip    INSERTION OF IMPLANTABLE LOOP RECORDER  03/25/2022    Dr. Maravilla    INSERTION OF PERMANENT PACEMAKER  12/02/2022    Dr. Maravilla    REMOVAL OF IMPLANTABLE LOOP RECORDER  12/02/2022    Dr. Maravilla    SKIN TAG REMOVAL      THUMB SURGERY       SH:   Social History     Socioeconomic History    Marital status:    Tobacco Use    Smoking status: Every Day     Packs/day: 0.25     Types: Cigarettes    Smokeless tobacco: Never    Tobacco comments:     Nicotine patches used    Substance and Sexual Activity    Alcohol use: Yes     Comment: 1-2 times weekly    Drug use: Not Currently     FH:   Family History   Problem Relation Age of Onset    Hypertension Mother     Hypertension  Sister     Diabetes Sister     Hypertension Sister     Hypertension Sister     Hypertension Sister     Hypertension Brother     Hypertension Brother     Hypertension Brother     Diabetes Paternal Grandmother      Allergies:   Review of patient's allergies indicates:   Allergen Reactions    Peanut Anaphylaxis     ROS:  Constitutional- no fever, fatigue, weakness  Eye- no vision loss, eye redness, drainage, or pain  ENMT- no sore throat, ear pain, sinus pain/congestion, nasal congestion/drainage  Respiratory- no cough, wheezing, or shortness of breath  CV- no chest pain, no palpitations, no edema  GI- no N/V/D; no abdominal pain  Physical Exam:  Vitals:    06/05/23 0846   Resp: 20     General: NAD  Cardio: RRR by peripheral pulse  Pulm: Normal WOB on room air, symmetric chest rise  Abd: Soft, NT/ND  MSK:  Left knee   No wounds or abrasions no effusion noted   Able to straight leg raise   Range of motion 0-120 compared to 0-130 on the contralateral  Medial and lateral joint line tenderness worse on the medial and posteromedial aspect of the knee   Positive Prince negative Lachman negative posterior drawer stable to varus and valgus stress at 0 and 30°  Imaging:   Left knee x-rays revealed some mild patellofemoral arthritis as well as some mild medial compartment joint space narrowing with some subchondral sclerosis, previous holes from ex fix from previous surgery noted in the distal femur.  Assessment/Plan:  58-year-old male with history of seizures with seizure in February 2023 landing on left knee with pain since likely meniscal tear versus inflammation of arthritis     -at this point patient just started physical therapy with home health and we will recommend continuing doing this for range of motion and strengthening as well as we will do left intra-articular steroid injection today     -patient will follow up as needed    Manny Burton MD  [facility] Orthopaedic Surgery

## 2023-06-08 DIAGNOSIS — M79.7 FIBROMYALGIA: ICD-10-CM

## 2023-06-08 DIAGNOSIS — F51.01 PRIMARY INSOMNIA: ICD-10-CM

## 2023-06-08 DIAGNOSIS — M75.100 TEAR OF ROTATOR CUFF, UNSPECIFIED LATERALITY, UNSPECIFIED TEAR EXTENT, UNSPECIFIED WHETHER TRAUMATIC: ICD-10-CM

## 2023-06-08 DIAGNOSIS — M35.9 UNDIFFERENTIATED CONNECTIVE TISSUE DISEASE: ICD-10-CM

## 2023-06-08 DIAGNOSIS — F32.A DEPRESSIVE DISORDER: ICD-10-CM

## 2023-06-08 DIAGNOSIS — I10 PRIMARY HYPERTENSION: ICD-10-CM

## 2023-06-08 RX ORDER — HYDROCODONE BITARTRATE AND ACETAMINOPHEN 10; 325 MG/1; MG/1
TABLET ORAL
Qty: 90 TABLET | Refills: 0 | Status: SHIPPED | OUTPATIENT
Start: 2023-06-08 | End: 2023-07-06 | Stop reason: SDUPTHER

## 2023-06-08 NOTE — TELEPHONE ENCOUNTER
----- Message from Regina Sepulveda sent at 6/8/2023 10:35 AM CDT -----  Regarding: Medication refill  Patient called requesting refill for [place]. [facility] in [place].

## 2023-07-06 DIAGNOSIS — F32.A DEPRESSIVE DISORDER: ICD-10-CM

## 2023-07-06 DIAGNOSIS — F51.01 PRIMARY INSOMNIA: ICD-10-CM

## 2023-07-06 DIAGNOSIS — M75.100 TEAR OF ROTATOR CUFF, UNSPECIFIED LATERALITY, UNSPECIFIED TEAR EXTENT, UNSPECIFIED WHETHER TRAUMATIC: ICD-10-CM

## 2023-07-06 DIAGNOSIS — I10 PRIMARY HYPERTENSION: ICD-10-CM

## 2023-07-06 DIAGNOSIS — M35.9 UNDIFFERENTIATED CONNECTIVE TISSUE DISEASE: ICD-10-CM

## 2023-07-06 DIAGNOSIS — M79.7 FIBROMYALGIA: ICD-10-CM

## 2023-07-06 RX ORDER — HYDROCODONE BITARTRATE AND ACETAMINOPHEN 10; 325 MG/1; MG/1
TABLET ORAL
Qty: 90 TABLET | Refills: 0 | Status: SHIPPED | OUTPATIENT
Start: 2023-07-06 | End: 2023-08-07 | Stop reason: SDUPTHER

## 2023-07-06 NOTE — TELEPHONE ENCOUNTER
----- Message from Regina Sepulveda sent at 7/6/2023  2:00 PM CDT -----  Regarding: Medication refill  Patient called requesting a refill for [place]. [facility] in [place].

## 2023-08-07 DIAGNOSIS — F51.01 PRIMARY INSOMNIA: ICD-10-CM

## 2023-08-07 DIAGNOSIS — M75.100 TEAR OF ROTATOR CUFF, UNSPECIFIED LATERALITY, UNSPECIFIED TEAR EXTENT, UNSPECIFIED WHETHER TRAUMATIC: ICD-10-CM

## 2023-08-07 DIAGNOSIS — I10 PRIMARY HYPERTENSION: ICD-10-CM

## 2023-08-07 DIAGNOSIS — M79.7 FIBROMYALGIA: ICD-10-CM

## 2023-08-07 DIAGNOSIS — M35.9 UNDIFFERENTIATED CONNECTIVE TISSUE DISEASE: ICD-10-CM

## 2023-08-07 DIAGNOSIS — F32.A DEPRESSIVE DISORDER: ICD-10-CM

## 2023-08-07 RX ORDER — HYDROCODONE BITARTRATE AND ACETAMINOPHEN 10; 325 MG/1; MG/1
TABLET ORAL
Qty: 90 TABLET | Refills: 0 | Status: SHIPPED | OUTPATIENT
Start: 2023-08-07 | End: 2023-09-06 | Stop reason: SDUPTHER

## 2023-08-10 DIAGNOSIS — M25.511 CHRONIC RIGHT SHOULDER PAIN: Primary | ICD-10-CM

## 2023-08-10 DIAGNOSIS — G89.29 CHRONIC RIGHT SHOULDER PAIN: Primary | ICD-10-CM

## 2023-09-06 DIAGNOSIS — M35.9 UNDIFFERENTIATED CONNECTIVE TISSUE DISEASE: ICD-10-CM

## 2023-09-06 DIAGNOSIS — I10 PRIMARY HYPERTENSION: ICD-10-CM

## 2023-09-06 DIAGNOSIS — F51.01 PRIMARY INSOMNIA: ICD-10-CM

## 2023-09-06 DIAGNOSIS — F32.A DEPRESSIVE DISORDER: ICD-10-CM

## 2023-09-06 DIAGNOSIS — M79.7 FIBROMYALGIA: ICD-10-CM

## 2023-09-06 DIAGNOSIS — M75.100 TEAR OF ROTATOR CUFF, UNSPECIFIED LATERALITY, UNSPECIFIED TEAR EXTENT, UNSPECIFIED WHETHER TRAUMATIC: ICD-10-CM

## 2023-09-06 RX ORDER — HYDROCODONE BITARTRATE AND ACETAMINOPHEN 10; 325 MG/1; MG/1
TABLET ORAL
Qty: 90 TABLET | Refills: 0 | Status: SHIPPED | OUTPATIENT
Start: 2023-09-06 | End: 2023-09-07 | Stop reason: SDUPTHER

## 2023-09-07 DIAGNOSIS — F51.01 PRIMARY INSOMNIA: ICD-10-CM

## 2023-09-07 DIAGNOSIS — I10 PRIMARY HYPERTENSION: ICD-10-CM

## 2023-09-07 DIAGNOSIS — M79.7 FIBROMYALGIA: ICD-10-CM

## 2023-09-07 DIAGNOSIS — F32.A DEPRESSIVE DISORDER: ICD-10-CM

## 2023-09-07 DIAGNOSIS — M35.9 UNDIFFERENTIATED CONNECTIVE TISSUE DISEASE: ICD-10-CM

## 2023-09-07 DIAGNOSIS — M75.100 TEAR OF ROTATOR CUFF, UNSPECIFIED LATERALITY, UNSPECIFIED TEAR EXTENT, UNSPECIFIED WHETHER TRAUMATIC: ICD-10-CM

## 2023-09-07 RX ORDER — HYDROCODONE BITARTRATE AND ACETAMINOPHEN 10; 325 MG/1; MG/1
TABLET ORAL
Qty: 90 TABLET | Refills: 0 | Status: SHIPPED | OUTPATIENT
Start: 2023-09-07 | End: 2023-11-07

## 2023-09-28 ENCOUNTER — OFFICE VISIT (OUTPATIENT)
Dept: RHEUMATOLOGY | Facility: CLINIC | Age: 58
End: 2023-09-28
Payer: MEDICAID

## 2023-09-28 VITALS
TEMPERATURE: 99 F | SYSTOLIC BLOOD PRESSURE: 132 MMHG | OXYGEN SATURATION: 98 % | HEART RATE: 68 BPM | BODY MASS INDEX: 37.87 KG/M2 | DIASTOLIC BLOOD PRESSURE: 82 MMHG | HEIGHT: 66 IN | WEIGHT: 235.63 LBS

## 2023-09-28 DIAGNOSIS — M79.7 FIBROMYALGIA: ICD-10-CM

## 2023-09-28 DIAGNOSIS — M35.9 UNDIFFERENTIATED CONNECTIVE TISSUE DISEASE: Primary | ICD-10-CM

## 2023-09-28 DIAGNOSIS — F51.01 PRIMARY INSOMNIA: ICD-10-CM

## 2023-09-28 DIAGNOSIS — F32.A DEPRESSIVE DISORDER: ICD-10-CM

## 2023-09-28 DIAGNOSIS — M75.101 TEAR OF RIGHT ROTATOR CUFF, UNSPECIFIED TEAR EXTENT, UNSPECIFIED WHETHER TRAUMATIC: ICD-10-CM

## 2023-09-28 DIAGNOSIS — I10 PRIMARY HYPERTENSION: ICD-10-CM

## 2023-09-28 DIAGNOSIS — M75.100 TEAR OF ROTATOR CUFF, UNSPECIFIED LATERALITY, UNSPECIFIED TEAR EXTENT, UNSPECIFIED WHETHER TRAUMATIC: ICD-10-CM

## 2023-09-28 PROCEDURE — 4010F PR ACE/ARB THEARPY RXD/TAKEN: ICD-10-PCS | Mod: CPTII,,, | Performed by: INTERNAL MEDICINE

## 2023-09-28 PROCEDURE — 99214 OFFICE O/P EST MOD 30 MIN: CPT | Mod: PBBFAC | Performed by: INTERNAL MEDICINE

## 2023-09-28 PROCEDURE — 4010F ACE/ARB THERAPY RXD/TAKEN: CPT | Mod: CPTII,,, | Performed by: INTERNAL MEDICINE

## 2023-09-28 PROCEDURE — 99214 PR OFFICE/OUTPT VISIT, EST, LEVL IV, 30-39 MIN: ICD-10-PCS | Mod: S$PBB,,, | Performed by: INTERNAL MEDICINE

## 2023-09-28 PROCEDURE — 3079F DIAST BP 80-89 MM HG: CPT | Mod: CPTII,,, | Performed by: INTERNAL MEDICINE

## 2023-09-28 PROCEDURE — 3075F PR MOST RECENT SYSTOLIC BLOOD PRESS GE 130-139MM HG: ICD-10-PCS | Mod: CPTII,,, | Performed by: INTERNAL MEDICINE

## 2023-09-28 PROCEDURE — 99999 PR PBB SHADOW E&M-EST. PATIENT-LVL IV: ICD-10-PCS | Mod: PBBFAC,,, | Performed by: INTERNAL MEDICINE

## 2023-09-28 PROCEDURE — 1159F MED LIST DOCD IN RCRD: CPT | Mod: CPTII,,, | Performed by: INTERNAL MEDICINE

## 2023-09-28 PROCEDURE — 3008F BODY MASS INDEX DOCD: CPT | Mod: CPTII,,, | Performed by: INTERNAL MEDICINE

## 2023-09-28 PROCEDURE — 3008F PR BODY MASS INDEX (BMI) DOCUMENTED: ICD-10-PCS | Mod: CPTII,,, | Performed by: INTERNAL MEDICINE

## 2023-09-28 PROCEDURE — 99999 PR PBB SHADOW E&M-EST. PATIENT-LVL IV: CPT | Mod: PBBFAC,,, | Performed by: INTERNAL MEDICINE

## 2023-09-28 PROCEDURE — 3079F PR MOST RECENT DIASTOLIC BLOOD PRESSURE 80-89 MM HG: ICD-10-PCS | Mod: CPTII,,, | Performed by: INTERNAL MEDICINE

## 2023-09-28 PROCEDURE — 3075F SYST BP GE 130 - 139MM HG: CPT | Mod: CPTII,,, | Performed by: INTERNAL MEDICINE

## 2023-09-28 PROCEDURE — 99214 OFFICE O/P EST MOD 30 MIN: CPT | Mod: S$PBB,,, | Performed by: INTERNAL MEDICINE

## 2023-09-28 PROCEDURE — 1159F PR MEDICATION LIST DOCUMENTED IN MEDICAL RECORD: ICD-10-PCS | Mod: CPTII,,, | Performed by: INTERNAL MEDICINE

## 2023-09-28 RX ORDER — PREGABALIN 100 MG/1
100 CAPSULE ORAL 3 TIMES DAILY
Qty: 90 CAPSULE | Refills: 5 | Status: ON HOLD | OUTPATIENT
Start: 2023-09-28 | End: 2024-02-27 | Stop reason: HOSPADM

## 2023-09-28 RX ORDER — ASPIRIN 325 MG
50000 TABLET, DELAYED RELEASE (ENTERIC COATED) ORAL
Qty: 5 CAPSULE | Refills: 5 | Status: SHIPPED | OUTPATIENT
Start: 2023-09-28

## 2023-09-28 RX ORDER — DICLOFENAC SODIUM 50 MG/1
50 TABLET, DELAYED RELEASE ORAL 2 TIMES DAILY PRN
Qty: 60 TABLET | Refills: 5 | Status: SHIPPED | OUTPATIENT
Start: 2023-09-28

## 2023-09-28 RX ORDER — METHOTREXATE 2.5 MG/1
15 TABLET ORAL
Qty: 30 TABLET | Refills: 5 | Status: SHIPPED | OUTPATIENT
Start: 2023-09-28 | End: 2024-03-26

## 2023-09-28 RX ORDER — FOLIC ACID 1 MG/1
1 TABLET ORAL DAILY
Qty: 30 TABLET | Refills: 5 | Status: SHIPPED | OUTPATIENT
Start: 2023-09-28 | End: 2024-03-26

## 2023-09-28 RX ORDER — HYDROXYCHLOROQUINE SULFATE 200 MG/1
200 TABLET, FILM COATED ORAL 2 TIMES DAILY
Qty: 60 TABLET | Refills: 5 | Status: SHIPPED | OUTPATIENT
Start: 2023-09-28

## 2023-09-28 RX ORDER — LANOLIN ALCOHOL/MO/W.PET/CERES
400 CREAM (GRAM) TOPICAL NIGHTLY
Qty: 30 TABLET | Refills: 5 | Status: SHIPPED | OUTPATIENT
Start: 2023-09-28

## 2023-09-28 RX ORDER — CYCLOBENZAPRINE HCL 10 MG
10 TABLET ORAL NIGHTLY
Qty: 30 TABLET | Refills: 5 | Status: ON HOLD | OUTPATIENT
Start: 2023-09-28 | End: 2024-02-27 | Stop reason: HOSPADM

## 2023-09-28 NOTE — PROGRESS NOTES
"Subjective:       Patient ID: Darius Restrepo is a 58 y.o. male.    Chief Complaint: Follow-up (Follow up. Patient complains of right shoulder pain. Pain 9/10.)    The patient is complaining of joint pain involving the MCP PIP wrist elbow shoulders hips knees and ankles bilaterally.  The pain is 4/10 in intensity dull in quality and continuous.  That is associated with a morning stiffness lasting for more than 60 minutes.  Is also having difficulty maintaining a good night of sleep.  This has been associated with myalgias.  Muscle aches are 4/10 in intensity dull in quality and continuous.  They are associated with fatigue.  No fever no chills no others.  Labs checked during this visit         Review of Systems   Constitutional:  Negative for appetite change, chills and fever.   HENT:  Negative for congestion, ear pain, mouth sores, nosebleeds and trouble swallowing.    Eyes:  Negative for photophobia and discharge.   Respiratory:  Negative for chest tightness and shortness of breath.    Cardiovascular:  Negative for chest pain.   Gastrointestinal:  Negative for abdominal pain and vomiting.   Endocrine: Negative.    Genitourinary:  Negative for hematuria.   Musculoskeletal:         As per HPI   Skin:  Negative for rash.   Neurological:  Negative for weakness.         Objective:   /82 (BP Location: Left arm, Patient Position: Sitting, BP Method: Large (Automatic))   Pulse 68   Temp 98.6 °F (37 °C) (Oral)   Ht 5' 6" (1.676 m)   Wt 106.9 kg (235 lb 9.6 oz)   SpO2 98%   BMI 38.03 kg/m²      Physical Exam   Constitutional: He is oriented to person, place, and time. He appears well-developed and well-nourished. No distress.   HENT:   Head: Normocephalic and atraumatic.   Right Ear: External ear normal.   Left Ear: External ear normal.   Eyes: Pupils are equal, round, and reactive to light.   Cardiovascular: Normal rate, regular rhythm and normal heart sounds.   Pulmonary/Chest: Breath sounds normal. "   Abdominal: Soft. There is no abdominal tenderness.   Musculoskeletal:      Right shoulder: Tenderness present.      Left shoulder: Tenderness present.      Right elbow: Tenderness present.      Left elbow: Tenderness present.      Right wrist: Tenderness present.      Left wrist: Tenderness present.      Cervical back: Neck supple.      Right hip: Tenderness present.      Left hip: Tenderness present.      Right knee: Tenderness present.      Left knee: Tenderness present.      Right ankle: Tenderness present.      Left ankle: Tenderness present.   Lymphadenopathy:     He has no cervical adenopathy.   Neurological: He is alert and oriented to person, place, and time. He displays normal reflexes. No cranial nerve deficit or sensory deficit. He exhibits normal muscle tone. Coordination normal.   Skin: No rash noted. No erythema.   Vitals reviewed.      Right Side Rheumatological Exam     The patient is tender to palpation of the shoulder, elbow, wrist, knee, 1st PIP, 1st MCP, 2nd PIP, 2nd MCP, 3rd PIP, 3rd MCP, 4th PIP, 4th MCP, 5th PIP, hip, ankle, 1st MTP, 2nd MTP, 3rd MTP, 4th MTP, 5th MTP, 1st toe IP, 2nd toe IP, 3rd toe IP, 4th toe IP and 5th toe IP    Left Side Rheumatological Exam     The patient is tender to palpation of the shoulder, elbow, wrist, knee, 1st PIP, 1st MCP, 2nd PIP, 2nd MCP, 3rd PIP, 3rd MCP, 4th PIP, 4th MCP, 5th PIP, 5th MCP, hip, ankle, 1st MTP, 2nd MTP, 3rd MTP, 4th MTP, 5th MTP, 1st toe IP, 2nd toe IP, 3rd toe IP, 4th toe IP and 5th toe IP.         Completed Fibromyalgia exam 18/18 tender points.  No data to display     Assessment:       1. Undifferentiated connective tissue disease    2. Fibromyalgia    3. Tear of right rotator cuff, unspecified tear extent, unspecified whether traumatic    4. Primary insomnia    5. Primary hypertension    6. Depressive disorder    7. Tear of rotator cuff, unspecified laterality, unspecified tear extent, unspecified whether traumatic          Medication  List with Changes/Refills   New Medications    MAGNESIUM OXIDE (MAG-OX) 400 MG (241.3 MG MAGNESIUM) TABLET    Take 1 tablet (400 mg total) by mouth nightly.       Start Date: 9/28/2023 End Date: --   Current Medications    ALPRAZOLAM (XANAX) 0.5 MG TABLET    TAKE ONE TABLET BY MOUTH TWICE A DAY AS NEEDED FOR ANXIETY       Start Date: 4/14/2023 End Date: --    AMLODIPINE (NORVASC) 10 MG TABLET    Take 10 mg by mouth once daily.       Start Date: 1/26/2023 End Date: --    ATORVASTATIN (LIPITOR) 20 MG TABLET    Take 20 mg by mouth once daily.       Start Date: 5/19/2022 End Date: --    BRIVIACT 100 MG TAB    Take 100 mg by mouth 2 (two) times daily.       Start Date: 12/7/2022 End Date: --    CARVEDILOL (COREG) 12.5 MG TABLET    Take 12.5 mg by mouth 2 (two) times daily.       Start Date: 2/21/2023 End Date: --    CLONIDINE 0.3 MG/24 HR TD PTWK (CATAPRES) 0.3 MG/24 HR    Place 1 patch onto the skin every 7 days.       Start Date: 5/25/2022 End Date: --    EPINEPHRINE (EPIPEN) 0.3 MG/0.3 ML ATIN    Inject 0.3 mLs (0.3 mg total) into the muscle as needed (allergic reaction).       Start Date: 7/5/2022  End Date: 9/28/2023    ESCITALOPRAM OXALATE (LEXAPRO) 20 MG TABLET    Take 0.5 tablets (10 mg total) by mouth once daily. After breakfast       Start Date: 5/25/2023 End Date: --    HYDROCHLOROTHIAZIDE (HYDRODIURIL) 12.5 MG TAB    Take 12.5 mg by mouth once daily.       Start Date: --        End Date: --    HYDROCODONE-ACETAMINOPHEN (NORCO)  MG PER TABLET    TAKE ONE TABLET BY MOUTH EVERY 8 HOURS AS NEEDED FOR PAIN       Start Date: 9/7/2023  End Date: --    LAMOTRIGINE (LAMICTAL) 200 MG TABLET    Take 200 mg by mouth 2 (two) times daily.       Start Date: 1/26/2023 End Date: --    LEVETIRACETAM (KEPPRA) 1000 MG TABLET    Take 1,000 mg by mouth 3 (three) times daily.       Start Date: --        End Date: --    LOSARTAN (COZAAR) 50 MG TABLET    Take 50 mg by mouth once daily.       Start Date: --        End Date: --     MUPIROCIN (BACTROBAN) 2 % OINTMENT    Apply topically 3 (three) times daily.       Start Date: 12/14/2022End Date: --    RISPERIDONE (RISPERDAL) 1 MG TABLET    Take 1 mg by mouth 2 (two) times daily.       Start Date: 1/5/2023  End Date: --    SERTRALINE (ZOLOFT) 100 MG TABLET    Take 100 mg by mouth once daily.       Start Date: 1/18/2023 End Date: --   Changed and/or Refilled Medications    Modified Medication Previous Medication    CHOLECALCIFEROL, VITAMIN D3, 1,250 MCG (50,000 UNIT) CAPSULE cholecalciferol, vitamin D3, 1,250 mcg (50,000 unit) capsule       Take 1 capsule (50,000 Units total) by mouth every 7 days.    Take 1 capsule (50,000 Units total) by mouth every 7 days.       Start Date: 9/28/2023 End Date: --    Start Date: 5/25/2023 End Date: 9/28/2023    CYCLOBENZAPRINE (FLEXERIL) 10 MG TABLET cyclobenzaprine (FLEXERIL) 10 MG tablet       Take 1 tablet (10 mg total) by mouth every evening.    Take 1 tablet (10 mg total) by mouth 3 (three) times daily.       Start Date: 9/28/2023 End Date: --    Start Date: 5/25/2023 End Date: 9/28/2023    DICLOFENAC (VOLTAREN) 50 MG EC TABLET diclofenac (VOLTAREN) 50 MG EC tablet       Take 1 tablet (50 mg total) by mouth 2 (two) times daily as needed (pain, after food).    Take 1 tablet (50 mg total) by mouth 2 (two) times daily as needed (pain, after food).       Start Date: 9/28/2023 End Date: --    Start Date: 5/25/2023 End Date: 9/28/2023    FOLIC ACID (FOLVITE) 1 MG TABLET folic acid (FOLVITE) 1 MG tablet       Take 1 tablet (1 mg total) by mouth once daily. After food    Take 1 tablet (1 mg total) by mouth once daily. After food       Start Date: 9/28/2023 End Date: 3/26/2024    Start Date: 5/25/2023 End Date: 9/28/2023    HYDROXYCHLOROQUINE (PLAQUENIL) 200 MG TABLET hydrOXYchloroQUINE (PLAQUENIL) 200 mg tablet       Take 1 tablet (200 mg total) by mouth 2 (two) times daily.    Take 1 tablet (200 mg total) by mouth 2 (two) times daily.       Start Date:  9/28/2023 End Date: --    Start Date: 5/25/2023 End Date: 9/28/2023    METHOTREXATE 2.5 MG TAB methotrexate 2.5 MG Tab       Take 6 tablets (15 mg total) by mouth every 7 days. EVERY  MONDAY  TAKE 3 TAB AFTER BREAKFAST AND 3 TAB AFTER SUPPER    Take 4 tablets (10 mg total) by mouth every 7 days. EVERY        TAKE 4 TAB TOGETHER AFTER SUPPER       Start Date: 9/28/2023 End Date: 3/26/2024    Start Date: 5/25/2023 End Date: 9/28/2023    PREGABALIN (LYRICA) 100 MG CAPSULE pregabalin (LYRICA) 100 MG capsule       Take 1 capsule (100 mg total) by mouth 3 (three) times daily.    Take 1 capsule (100 mg total) by mouth 3 (three) times daily.       Start Date: 9/28/2023 End Date: --    Start Date: 5/25/2023 End Date: 9/28/2023         Plan:         Problem List Items Addressed This Visit          Psychiatric    Depressive disorder    Relevant Medications    cholecalciferol, vitamin D3, 1,250 mcg (50,000 unit) capsule    cyclobenzaprine (FLEXERIL) 10 MG tablet    diclofenac (VOLTAREN) 50 MG EC tablet    folic acid (FOLVITE) 1 MG tablet    hydroxychloroquine (PLAQUENIL) 200 mg tablet    methotrexate 2.5 MG Tab    pregabalin (LYRICA) 100 MG capsule    magnesium oxide (MAG-OX) 400 mg (241.3 mg magnesium) tablet       Cardiac/Vascular    Hypertension    Relevant Medications    cholecalciferol, vitamin D3, 1,250 mcg (50,000 unit) capsule    cyclobenzaprine (FLEXERIL) 10 MG tablet    diclofenac (VOLTAREN) 50 MG EC tablet    folic acid (FOLVITE) 1 MG tablet    hydroxychloroquine (PLAQUENIL) 200 mg tablet    methotrexate 2.5 MG Tab    pregabalin (LYRICA) 100 MG capsule    magnesium oxide (MAG-OX) 400 mg (241.3 mg magnesium) tablet       Immunology/Multi System    Undifferentiated connective tissue disease - Primary    Relevant Medications    cholecalciferol, vitamin D3, 1,250 mcg (50,000 unit) capsule    cyclobenzaprine (FLEXERIL) 10 MG tablet    diclofenac (VOLTAREN) 50 MG EC tablet    folic acid (FOLVITE) 1 MG tablet     hydroxychloroquine (PLAQUENIL) 200 mg tablet    methotrexate 2.5 MG Tab    pregabalin (LYRICA) 100 MG capsule    magnesium oxide (MAG-OX) 400 mg (241.3 mg magnesium) tablet       Orthopedic    Fibromyalgia    Relevant Medications    cholecalciferol, vitamin D3, 1,250 mcg (50,000 unit) capsule    cyclobenzaprine (FLEXERIL) 10 MG tablet    diclofenac (VOLTAREN) 50 MG EC tablet    folic acid (FOLVITE) 1 MG tablet    hydroxychloroquine (PLAQUENIL) 200 mg tablet    methotrexate 2.5 MG Tab    pregabalin (LYRICA) 100 MG capsule    magnesium oxide (MAG-OX) 400 mg (241.3 mg magnesium) tablet    Tear of rotator cuff    Relevant Medications    cholecalciferol, vitamin D3, 1,250 mcg (50,000 unit) capsule    cyclobenzaprine (FLEXERIL) 10 MG tablet    diclofenac (VOLTAREN) 50 MG EC tablet    folic acid (FOLVITE) 1 MG tablet    hydroxychloroquine (PLAQUENIL) 200 mg tablet    methotrexate 2.5 MG Tab    pregabalin (LYRICA) 100 MG capsule    magnesium oxide (MAG-OX) 400 mg (241.3 mg magnesium) tablet       Other    Insomnia    Relevant Medications    cholecalciferol, vitamin D3, 1,250 mcg (50,000 unit) capsule    cyclobenzaprine (FLEXERIL) 10 MG tablet    diclofenac (VOLTAREN) 50 MG EC tablet    folic acid (FOLVITE) 1 MG tablet    hydroxychloroquine (PLAQUENIL) 200 mg tablet    methotrexate 2.5 MG Tab    pregabalin (LYRICA) 100 MG capsule    magnesium oxide (MAG-OX) 400 mg (241.3 mg magnesium) tablet

## 2023-10-12 ENCOUNTER — HOSPITAL ENCOUNTER (OUTPATIENT)
Dept: RADIOLOGY | Facility: HOSPITAL | Age: 58
Discharge: HOME | End: 2023-10-12
Attending: NURSE PRACTITIONER
Payer: MEDICAID

## 2023-10-12 ENCOUNTER — OFFICE VISIT (OUTPATIENT)
Dept: ORTHOPEDICS | Facility: CLINIC | Age: 58
End: 2023-10-12
Payer: MEDICAID

## 2023-10-12 VITALS
DIASTOLIC BLOOD PRESSURE: 98 MMHG | WEIGHT: 240.81 LBS | HEART RATE: 81 BPM | BODY MASS INDEX: 38.7 KG/M2 | HEIGHT: 66 IN | SYSTOLIC BLOOD PRESSURE: 161 MMHG

## 2023-10-12 DIAGNOSIS — G89.29 CHRONIC RIGHT SHOULDER PAIN: ICD-10-CM

## 2023-10-12 DIAGNOSIS — S46.011A TRAUMATIC COMPLETE TEAR OF RIGHT ROTATOR CUFF, INITIAL ENCOUNTER: Primary | ICD-10-CM

## 2023-10-12 DIAGNOSIS — M25.511 CHRONIC RIGHT SHOULDER PAIN: ICD-10-CM

## 2023-10-12 PROCEDURE — 4010F PR ACE/ARB THEARPY RXD/TAKEN: ICD-10-PCS | Mod: CPTII,,, | Performed by: NURSE PRACTITIONER

## 2023-10-12 PROCEDURE — 99215 PR OFFICE/OUTPT VISIT, EST, LEVL V, 40-54 MIN: ICD-10-PCS | Mod: S$PBB,,, | Performed by: NURSE PRACTITIONER

## 2023-10-12 PROCEDURE — 99215 OFFICE O/P EST HI 40 MIN: CPT | Mod: PBBFAC | Performed by: NURSE PRACTITIONER

## 2023-10-12 PROCEDURE — 3008F PR BODY MASS INDEX (BMI) DOCUMENTED: ICD-10-PCS | Mod: CPTII,,, | Performed by: NURSE PRACTITIONER

## 2023-10-12 PROCEDURE — 99215 OFFICE O/P EST HI 40 MIN: CPT | Mod: S$PBB,,, | Performed by: NURSE PRACTITIONER

## 2023-10-12 PROCEDURE — 73030 X-RAY EXAM OF SHOULDER: CPT | Mod: TC,RT

## 2023-10-12 PROCEDURE — 1159F PR MEDICATION LIST DOCUMENTED IN MEDICAL RECORD: ICD-10-PCS | Mod: CPTII,,, | Performed by: NURSE PRACTITIONER

## 2023-10-12 PROCEDURE — 3080F DIAST BP >= 90 MM HG: CPT | Mod: CPTII,,, | Performed by: NURSE PRACTITIONER

## 2023-10-12 PROCEDURE — 1160F RVW MEDS BY RX/DR IN RCRD: CPT | Mod: CPTII,,, | Performed by: NURSE PRACTITIONER

## 2023-10-12 PROCEDURE — 1159F MED LIST DOCD IN RCRD: CPT | Mod: CPTII,,, | Performed by: NURSE PRACTITIONER

## 2023-10-12 PROCEDURE — 3008F BODY MASS INDEX DOCD: CPT | Mod: CPTII,,, | Performed by: NURSE PRACTITIONER

## 2023-10-12 PROCEDURE — 1160F PR REVIEW ALL MEDS BY PRESCRIBER/CLIN PHARMACIST DOCUMENTED: ICD-10-PCS | Mod: CPTII,,, | Performed by: NURSE PRACTITIONER

## 2023-10-12 PROCEDURE — 3077F SYST BP >= 140 MM HG: CPT | Mod: CPTII,,, | Performed by: NURSE PRACTITIONER

## 2023-10-12 PROCEDURE — 3077F PR MOST RECENT SYSTOLIC BLOOD PRESSURE >= 140 MM HG: ICD-10-PCS | Mod: CPTII,,, | Performed by: NURSE PRACTITIONER

## 2023-10-12 PROCEDURE — 4010F ACE/ARB THERAPY RXD/TAKEN: CPT | Mod: CPTII,,, | Performed by: NURSE PRACTITIONER

## 2023-10-12 PROCEDURE — 3080F PR MOST RECENT DIASTOLIC BLOOD PRESSURE >= 90 MM HG: ICD-10-PCS | Mod: CPTII,,, | Performed by: NURSE PRACTITIONER

## 2023-10-12 NOTE — PROGRESS NOTES
Subjective:   PATIENT ID: Darius Restrepo is a 58 y.o. male. Smoker. Employment HX: oil field, currently seeking disability.    Seen [facility] for same DX since n/a.  Seen for knee pain by [facility]/ Ortho res. Since 2023, ortho res. For right shoulder 2022  CHIEF COMPLAINT: Shoulder Pain of the Right Shoulder (Denies Injury/)    HPI:    Right aching lateral  and anterior shoulder pain. Left dominate  Injury:  fall with seizure in 2/2023   Onset: several years ago worsening over time  Modifying Factors: increased with overhead movement, worse with activity, increased with lifting, increased when lying on affected shoulder, and increased pain at PM  Associated Symptoms: popping, decreased  strength, decreased ROM, and difficulty sleeping s/t pain  Activity: sedentary with light activity and pain moderately interferes with ADLs .   Previous Treatments: CSI since 2023 last injection 8/17/22 (ortho res.), HEP with TheraBand, RX NSAIDs, and RX PT completed 6 wks/ 3 xs per week d/c'd 6/2023  without adequate relief at this time.   PMH: + CVD; HX stroke 2021 which effected right side function   Family History: + OA    NOTE: Existing patient, new to me referred for right shoulder pain with abnormal MRI findings with completed  conservative treatments.  Symptoms affecting ADLs and ability to work.   Patient previously seen by ortho res 8/17/22 with recommendation for conservative treatments.  Since that visit patient had acute injury of shoulder with a fall after a seizure 2/2023 with worsening symptoms and decreased ROM.  MRI ordered by PCP and re-referred to us.  Would like to be re-evaluated for surgery.     Current Outpatient Medications:     ALPRAZolam (XANAX) 0.5 MG tablet, TAKE ONE TABLET BY MOUTH TWICE A DAY AS NEEDED FOR ANXIETY, Disp: 60 tablet, Rfl: 5    amLODIPine (NORVASC) 10 MG tablet, Take 10 mg by mouth once daily., Disp: , Rfl:     atorvastatin (LIPITOR) 20 MG tablet, Take 20 mg by mouth once daily., Disp: ,  Rfl:     BRIVIACT 100 mg Tab, Take 100 mg by mouth 2 (two) times daily., Disp: , Rfl:     carvediloL (COREG) 12.5 MG tablet, Take 12.5 mg by mouth 2 (two) times daily., Disp: , Rfl:     cholecalciferol, vitamin D3, 1,250 mcg (50,000 unit) capsule, Take 1 capsule (50,000 Units total) by mouth every 7 days., Disp: 5 capsule, Rfl: 5    cloNIDine 0.3 mg/24 hr td ptwk (CATAPRES) 0.3 mg/24 hr, Place 1 patch onto the skin every 7 days., Disp: , Rfl:     cyclobenzaprine (FLEXERIL) 10 MG tablet, Take 1 tablet (10 mg total) by mouth every evening., Disp: 30 tablet, Rfl: 5    diclofenac (VOLTAREN) 50 MG EC tablet, Take 1 tablet (50 mg total) by mouth 2 (two) times daily as needed (pain, after food)., Disp: 60 tablet, Rfl: 5    EScitalopram oxalate (LEXAPRO) 20 MG tablet, Take 0.5 tablets (10 mg total) by mouth once daily. After breakfast, Disp: 30 tablet, Rfl: 5    folic acid (FOLVITE) 1 MG tablet, Take 1 tablet (1 mg total) by mouth once daily. After food, Disp: 30 tablet, Rfl: 5    hydroCHLOROthiazide (HYDRODIURIL) 12.5 MG Tab, Take 12.5 mg by mouth once daily., Disp: , Rfl:     HYDROcodone-acetaminophen (NORCO)  mg per tablet, TAKE ONE TABLET BY MOUTH EVERY 8 HOURS AS NEEDED FOR PAIN, Disp: 90 tablet, Rfl: 0    hydroxychloroquine (PLAQUENIL) 200 mg tablet, Take 1 tablet (200 mg total) by mouth 2 (two) times daily., Disp: 60 tablet, Rfl: 5    lamoTRIgine (LAMICTAL) 200 MG tablet, Take 200 mg by mouth 2 (two) times daily., Disp: , Rfl:     levETIRAcetam (KEPPRA) 1000 MG tablet, Take 1,000 mg by mouth 3 (three) times daily., Disp: , Rfl:     losartan (COZAAR) 50 MG tablet, Take 50 mg by mouth once daily., Disp: , Rfl:     magnesium oxide (MAG-OX) 400 mg (241.3 mg magnesium) tablet, Take 1 tablet (400 mg total) by mouth nightly., Disp: 30 tablet, Rfl: 5    methotrexate 2.5 MG Tab, Take 6 tablets (15 mg total) by mouth every 7 days. EVERY  MONDAY  TAKE 3 TAB AFTER BREAKFAST AND 3 TAB AFTER SUPPER, Disp: 30 tablet, Rfl: 5    " mupirocin (BACTROBAN) 2 % ointment, Apply topically 3 (three) times daily., Disp: 30 g, Rfl: 0    pregabalin (LYRICA) 100 MG capsule, Take 1 capsule (100 mg total) by mouth 3 (three) times daily., Disp: 90 capsule, Rfl: 5    risperiDONE (RISPERDAL) 1 MG tablet, Take 1 mg by mouth 2 (two) times daily., Disp: , Rfl:     sertraline (ZOLOFT) 100 MG tablet, Take 100 mg by mouth once daily., Disp: , Rfl:     EPINEPHrine (EPIPEN) 0.3 mg/0.3 mL AtIn, Inject 0.3 mLs (0.3 mg total) into the muscle as needed (allergic reaction)., Disp: 1 each, Rfl: 1  No current facility-administered medications for this visit.    Facility-Administered Medications Ordered in Other Visits:     diazePAM tablet 5 mg, 5 mg, Oral, On Call Procedure, Hemant Maravilal DO, 5 mg at 12/02/22 1306    diphenhydrAMINE capsule 50 mg, 50 mg, Oral, On Call Procedure, Hemant Maravilla DO, 50 mg at 12/02/22 1305    sodium chloride 0.9% flush 10 mL, 10 mL, Intravenous, PRN, Hemant Maravilla DO  Review of patient's allergies indicates:   Allergen Reactions    Peanut Anaphylaxis     Hemoglobin A1c   Date Value Ref Range Status   12/01/2022 5.8 <=7.0 % Final   09/30/2022 5.9 <=7.0 % Final   10/19/2021 5.4 <<=7.0 % Final      Body mass index is 38.87 kg/m².   Vitals:    10/12/23 0813 10/12/23 0814   BP:  (!) 161/98   Pulse:  81   Weight: 109.2 kg (240 lb 12.8 oz) 109.2 kg (240 lb 12.8 oz)   Height: 5' 6" (1.676 m) 5' 6" (1.676 m)   PainSc:    8      REVIEW OF SYSTEMS:  A ten-point review of systems was performed and is negative, except as mentioned above   Objective:   MSK Shoulder Exam  General:  no apparent distress, no pain indicators,  obesity  Inspection: poor posture with rounded upper back noted  LEFT SHOULDER RIGHT SHOULDER   no soft tissue swelling, no guarding/ self imposed immobilization of shoulder, no winged scapula, no erythema, no contusion,  no masses, no scars, no clavicle deformity, no shoulder dislocation deformity no soft tissue swelling, noted " guarding/ self imposed immobilization of shoulder, no erythema, no contusion,  no masses, no scars, no clavicle deformity, no shoulder dislocation deformity     Palpation:     LEFT SHOULDER RIGHT SHOULDER   normal temperature, no deconditioning noted, no tenderness noted of suprasternal notch/ sternoclavicular joint, clavicle, coracoid process, AC joint, acromion, subacromial bursa, subdeltoid bursa, bicipital groove, GH joint, supraspinatus, infraspinatus, trapezius, rhomboids, scapula spine, bicep, and cervical spine, no  crepitus with ROM, no palpable hypersensitive nodule/ trigger point  normal temperature, noted deconditioning supraspinatus, noted tenderness of AC joint, subdeltoid bursa, GH joint, and supraspinatus, no tenderness noted of suprasternal notch/ sternoclavicular joint, trapezius, rhomboids, and scapula spine, no  crepitus with ROM, no palpable hypersensitive nodule/ trigger point        ROM Active Flexion / Extension  LEFT SHOULDER RIGHT SHOULDER   Abduction 80  Horizontal Adduction 35  Posterior Extension 35  Forward Flexion 70  Internal Rotation sacrum  External Rotation 45 Abduction 180  Horizontal Adduction 45  Posterior Extension 45  Forward Flexion 180  Internal Rotation T4 - T8  External Rotation 60     Strength   LEFT SHOULDER RIGHT SHOULDER   Flexion 3 / 5   Extension 3 / 5  Abductors 3 / 5   Adduction 3 / 5  External Rotation 3 / 5  Internal Rotation 3 / 5  Scapular Elevation 3 / 5  Scapular Protraction 3 / 5 Flexion 5 / 5   Extension 5 / 5  Abductors 5 / 5   Adduction 5 / 5  External Rotation 5 / 5  Internal Rotation 5 / 5  Scapular Elevation 5 / 5  Scapular Protraction 5 / 5     Special Testing:         Bicep Tendon L+ L-- R+ R-- Not Tested    Hook Test [] [x] [] [x] []    Gio Sign [] [x] [] [x] []    Rotator Cuff         Full Can [] [x] [x] [] []    Empty Can [] [x] [x] [] []    Lift Off  [] [x] [x] [] []    Belly Press [] [x] [x] [] []    Hornblower [] [x] [x] [] []    Drop Arm []  [x] [x] [] []    AC Joint         Cross Arm Adduction [] [x] [x] [] []    Impingement         Hawkin's [] [x] [x] [] []    Painful Arc  [] [x] [x] [] []    Painful Arc 170-180 [] [x] [] [] [x]    Instability         Shoulder Appreh. [] [x] [] [x] []    Labral         Latimer's [] [x] [x] [] []       Cervical ROM Pain negative  Neurovascular: Intact to light touch  Neuro/ Psych: Awake, alert, oriented, normal mood and affect  Lymphatic: No LAD  Skin/ Soft Tissue: no rash, skin intact  Assessment:   IMAGING: X-ray 4 views of right shoulder ordered, reviewed and independently interpreted by me. Discussed with patient. Noted no acute findings, DJD noted, awaiting radiologist findings    MRI of the right shoulder  7/25/23 ( no images available, EMR with report only)  was performed utilizing T1-weighted and fluid sensitive sequences with a standard [facility] protocol .  CONTRAST: 8/25/2021.  COMMENT:     High-grade full-thickness tear of the supraspinatus with retraction to the level of the glenohumeral joint. Incomplete full-thickness tearing and tendinosis of the infraspinatus at the footplate. Full-thickness tear of the cranial fibers of subscapularis, with intra-articular medialization of the long head biceps tendon. There is intra and extra articular long head biceps tendinosis. Teres minor is intact. There is mild fatty atrophy of the superior acetabular musculature.  Small glenohumeral effusion which communicates with the subacromial/subdeltoid bursa.  There is tearing of the superior through anterior glenoid labrum. The humeral cartilage appears well-preserved.  There is mild acromioclavicular osteoarthritis. No subacromial spur, lateral acromial downsloping, acromial undersurface hook deformity, or os acromiale.   There is marrow edema of the posterior superior glenoid. There is no Hill-Sachs deformity or osseous Bankart lesion.    EMR REVIEW: completed with noted Referral documentation  "reviewed and [facility] Ortho Res. visit 8/17/22 reviewed    DIAGNOSIS:  1. Traumatic complete tear of right rotator cuff, initial encounter    2. BMI 38.0-38.9,adult       Plan:     Orders Placed This Encounter    X-ray Shoulder 2 or More Views Right     Ongoing education about DX and treatment recommendations including conservative treatments of daily HEP with TheraBand, muscle strengthening, adequate vit D/C, glucosamine 1500 mg/day and daily acetaminophen 1000 mg 3 times/ day if able to tolerate.    Treatment plan: Moderate exacerbation of chronic Right Rotator Cuff Tear complicated by recent injury worsening symptoms Extended time spent discussing MRI findings and educating patient on treatment options.  Discussed surgical evaluation referral versus non-surgical treatments including RX PT and CSI for symptoms relief.  Patient desires surgical treatment options at this time.  I will refer to [facility]""[facility] orthopedic surgeon residents for evaluation. Patient is aware I am not guaranteeing surgery. Determination of appropriateness for surgery will be made by surgeon at evaluation.   Procedure:  offered and patient declines at this time.   RX Medications: continue medications as RX per PCP.  RTC: next available appt. with ortho res.   NOTE: at last visit with ortho res Dr. Moreland 8/17/22 documentation states "At last visit we stressed the recommendation for continued non-operative treatment of his shoulder pain."  Patient aware and desires re-evaluation.   Patient instructed to obtain CD of MRI done at [facility] and bring to f/u appt. for ortho res. Review.        Catalina Dawkins FNP [facility]  Procedure Note:   None    Time Based Billing   Total Time Spent with Patient: 40 minutes .  Visit Start Time: 0830  10 minutes spent prior to visit reviewing EMR, prior labs and x-rays.  20 minutes spent in visit with patient face-to-face time completing exam, obtaining history, educating on DX and " treatment plan.  10 minutes spent after visit completing EMR documentation.   Visit End Time: 0910    Please be aware that this note has been generated with the assistance of MModal voice-to-text.  Please excuse any spelling or grammatical errors.

## 2023-10-16 ENCOUNTER — OFFICE VISIT (OUTPATIENT)
Dept: ORTHOPEDICS | Facility: CLINIC | Age: 58
End: 2023-10-16
Payer: MEDICAID

## 2023-10-16 VITALS
HEART RATE: 75 BPM | DIASTOLIC BLOOD PRESSURE: 86 MMHG | HEIGHT: 66 IN | WEIGHT: 236.81 LBS | TEMPERATURE: 98 F | BODY MASS INDEX: 38.06 KG/M2 | SYSTOLIC BLOOD PRESSURE: 137 MMHG

## 2023-10-16 DIAGNOSIS — S46.011A TRAUMATIC COMPLETE TEAR OF RIGHT ROTATOR CUFF, INITIAL ENCOUNTER: Primary | ICD-10-CM

## 2023-10-16 PROCEDURE — 4010F PR ACE/ARB THEARPY RXD/TAKEN: ICD-10-PCS | Mod: CPTII,,, | Performed by: ORTHOPAEDIC SURGERY

## 2023-10-16 PROCEDURE — 3075F SYST BP GE 130 - 139MM HG: CPT | Mod: CPTII,,, | Performed by: ORTHOPAEDIC SURGERY

## 2023-10-16 PROCEDURE — 99499 UNLISTED E&M SERVICE: CPT | Mod: S$PBB,,, | Performed by: ORTHOPAEDIC SURGERY

## 2023-10-16 PROCEDURE — 3079F PR MOST RECENT DIASTOLIC BLOOD PRESSURE 80-89 MM HG: ICD-10-PCS | Mod: CPTII,,, | Performed by: ORTHOPAEDIC SURGERY

## 2023-10-16 PROCEDURE — 4010F ACE/ARB THERAPY RXD/TAKEN: CPT | Mod: CPTII,,, | Performed by: ORTHOPAEDIC SURGERY

## 2023-10-16 PROCEDURE — 3079F DIAST BP 80-89 MM HG: CPT | Mod: CPTII,,, | Performed by: ORTHOPAEDIC SURGERY

## 2023-10-16 PROCEDURE — 3008F PR BODY MASS INDEX (BMI) DOCUMENTED: ICD-10-PCS | Mod: CPTII,,, | Performed by: ORTHOPAEDIC SURGERY

## 2023-10-16 PROCEDURE — 1159F PR MEDICATION LIST DOCUMENTED IN MEDICAL RECORD: ICD-10-PCS | Mod: CPTII,,, | Performed by: ORTHOPAEDIC SURGERY

## 2023-10-16 PROCEDURE — 99214 OFFICE O/P EST MOD 30 MIN: CPT | Mod: PBBFAC

## 2023-10-16 PROCEDURE — 99499 NO LOS: ICD-10-PCS | Mod: S$PBB,,, | Performed by: ORTHOPAEDIC SURGERY

## 2023-10-16 PROCEDURE — 3075F PR MOST RECENT SYSTOLIC BLOOD PRESS GE 130-139MM HG: ICD-10-PCS | Mod: CPTII,,, | Performed by: ORTHOPAEDIC SURGERY

## 2023-10-16 PROCEDURE — 3008F BODY MASS INDEX DOCD: CPT | Mod: CPTII,,, | Performed by: ORTHOPAEDIC SURGERY

## 2023-10-16 PROCEDURE — 1159F MED LIST DOCD IN RCRD: CPT | Mod: CPTII,,, | Performed by: ORTHOPAEDIC SURGERY

## 2023-10-16 NOTE — PROGRESS NOTES
[facility]  Established Patient Office Visit  10/16/2023       Patient ID: Darius Restrepo  YOB: 1965  MRN: [id number]    Chief Complaint: Pain of the Right Shoulder (Right shoulder pain for the last years getting worst. Had injection in June and Physical therapy which did not help. Taking OTC and RX which does help. Home exercises does not really help Having numbness and tingling that goes down the arm. Patient brought in MRI  )      Past Orthopaedic Surgeries:  Prior fixation of his finger, prior knee arthroscopy    HPI:  Darius Restrepo is a 58 y.o. male with history of pack per day smoking, knee osteoarthritis, stroke, AFib, myocardial infarction, pacemaker presenting with multiple month history of right shoulder pain and weakness.  Patient does not recall any acute event.  Does not have any paresthesias in the right upper extremity.  He has significant pain and weakness with shoulder range of motion especially with overhead reach.  He is no longer able to reach over his head do any overhead activities.  He does not have any symptoms on the left side.  He has attempted treatment with physical therapy and injections without any relief.  MRI has shown full-thickness tearing of the supraspinatus and infraspinatus he is not used any IV drugs in is not a daily drinker.    Past Medical History:    Past Medical History:   Diagnosis Date    A-fib     Bipolar affective disorder, mixed     CVA (cerebral vascular accident)     Depression     Epilepsy     GERD (gastroesophageal reflux disease)     Hyperlipidemia     Hypertension     KRISTIN on CPAP     Pacemaker     TIA (transient ischemic attack)      Past Surgical History:   Procedure Laterality Date    A-V CARDIAC PACEMAKER INSERTION N/A 12/02/2022    Procedure: INSERTION, CARDIAC PACEMAKER, DUAL CHAMBER;  Surgeon: Hemant Maravilla DO;  Location: [facility] CATH LAB;  Service: Cardiology;  Laterality: N/A;  EXPLANT OF EVENT RECORDER /   IMPLANT OF DUAL CHAMBER PPM (SJM)    FEMUR IM MARITO REMOVAL      HIP SURGERY Left     Marito inserted from knee to hip    INSERTION OF IMPLANTABLE LOOP RECORDER  03/25/2022    Dr. Maravilla    INSERTION OF PERMANENT PACEMAKER  12/02/2022    Dr. Maravilla    INTRAMEDULLARY RODDING OF FEMUR      OPEN REDUCTION AND INTERNAL FIXATION (ORIF) OF INJURY OF THUMB      REMOVAL OF IMPLANTABLE LOOP RECORDER  12/02/2022    Dr. Maravilla    SKIN TAG REMOVAL      THUMB SURGERY       Family History   Problem Relation Age of Onset    Hypertension Mother     Hypertension Sister     Diabetes Sister     Hypertension Sister     Hypertension Sister     Hypertension Sister     Hypertension Brother     Hypertension Brother     Hypertension Brother     Diabetes Paternal Grandmother      Social History     Socioeconomic History    Marital status:    Tobacco Use    Smoking status: Every Day     Current packs/day: 1.00     Types: Cigarettes    Smokeless tobacco: Never    Tobacco comments:     Nicotine patches used    Substance and Sexual Activity    Alcohol use: Not Currently     Comment: 1-2 times weekly    Drug use: Not Currently    Sexual activity: Yes     Medication List with Changes/Refills   Current Medications    ALPRAZOLAM (XANAX) 0.5 MG TABLET    TAKE ONE TABLET BY MOUTH TWICE A DAY AS NEEDED FOR ANXIETY    AMLODIPINE (NORVASC) 10 MG TABLET    Take 10 mg by mouth once daily.    ATORVASTATIN (LIPITOR) 20 MG TABLET    Take 20 mg by mouth once daily.    BRIVIACT 100 MG TAB    Take 100 mg by mouth 2 (two) times daily.    CARVEDILOL (COREG) 12.5 MG TABLET    Take 12.5 mg by mouth 2 (two) times daily.    CHOLECALCIFEROL, VITAMIN D3, 1,250 MCG (50,000 UNIT) CAPSULE    Take 1 capsule (50,000 Units total) by mouth every 7 days.    CLONIDINE 0.3 MG/24 HR TD PTWK (CATAPRES) 0.3 MG/24 HR    Place 1 patch onto the skin every 7 days.    CYCLOBENZAPRINE (FLEXERIL) 10 MG TABLET    Take 1 tablet (10 mg total) by mouth every evening.    DICLOFENAC (VOLTAREN)  50 MG EC TABLET    Take 1 tablet (50 mg total) by mouth 2 (two) times daily as needed (pain, after food).    EPINEPHRINE (EPIPEN) 0.3 MG/0.3 ML ATIN    Inject 0.3 mLs (0.3 mg total) into the muscle as needed (allergic reaction).    ESCITALOPRAM OXALATE (LEXAPRO) 20 MG TABLET    Take 0.5 tablets (10 mg total) by mouth once daily. After breakfast    FOLIC ACID (FOLVITE) 1 MG TABLET    Take 1 tablet (1 mg total) by mouth once daily. After food    HYDROCHLOROTHIAZIDE (HYDRODIURIL) 12.5 MG TAB    Take 12.5 mg by mouth once daily.    HYDROCODONE-ACETAMINOPHEN (NORCO)  MG PER TABLET    TAKE ONE TABLET BY MOUTH EVERY 8 HOURS AS NEEDED FOR PAIN    HYDROXYCHLOROQUINE (PLAQUENIL) 200 MG TABLET    Take 1 tablet (200 mg total) by mouth 2 (two) times daily.    LAMOTRIGINE (LAMICTAL) 200 MG TABLET    Take 200 mg by mouth 2 (two) times daily.    LEVETIRACETAM (KEPPRA) 1000 MG TABLET    Take 1,000 mg by mouth 3 (three) times daily.    LOSARTAN (COZAAR) 50 MG TABLET    Take 50 mg by mouth once daily.    MAGNESIUM OXIDE (MAG-OX) 400 MG (241.3 MG MAGNESIUM) TABLET    Take 1 tablet (400 mg total) by mouth nightly.    METHOTREXATE 2.5 MG TAB    Take 6 tablets (15 mg total) by mouth every 7 days. EVERY  MONDAY  TAKE 3 TAB AFTER BREAKFAST AND 3 TAB AFTER SUPPER    MUPIROCIN (BACTROBAN) 2 % OINTMENT    Apply topically 3 (three) times daily.    PREGABALIN (LYRICA) 100 MG CAPSULE    Take 1 capsule (100 mg total) by mouth 3 (three) times daily.    RISPERIDONE (RISPERDAL) 1 MG TABLET    Take 1 mg by mouth 2 (two) times daily.    SERTRALINE (ZOLOFT) 100 MG TABLET    Take 100 mg by mouth once daily.     Review of patient's allergies indicates:   Allergen Reactions    Peanut Anaphylaxis       Physical Exam:    Right shoulder   No tenderness to palpation of the AC joint   Tenderness to palpation at the biceps groove   Active range of motion limited to 40° of abduction, 40° of forward flexion, 20° of external rotation, internal rotation to the  sacrum   Passive range of motion 100° of abduction 100° of forward flexion 50° of external rotation  Mckinley positive, Ruthy's positive  Speed's positive [place] some positive  Significant weakness with empty can shoulder abduction forward flexion and external rotation  Motor and sensory intact throughout the right upper extremity    Imaging:  X-rays of the right shoulder show well-preserved joint space no significant osteophytes some mild superior migration humeral head    MRI shows full-thickness tear of the supraspinatus with significant retraction, full-thickness tear of the infraspinatus some tendinosis of the subscapularis with tearing and biceps tendinosis    Assessment and Plan:    Darius Restrepo is a 58 y.o. male with very complicated medical history with right shoulder rotator cuff tear and biceps pathology.  He is failed conservative management.  -think this patient may be a candidate for a right shoulder diagnostic arthroscopy with possible rotator cuff repair possible biceps tenodesis and subacromial decompression however he is very complicated past medical history I want him to be evaluated for surgery by his primary care provider and his cardiologist  Follow up after clearance for surgery    Delgado Mcelroy MD  [employer] Orthopaedic Surgery PGY-3

## 2023-10-16 NOTE — LETTER
Clearance Form    PLEASE PROVIDE ALL INFORMATION      Date : 10/16/2023  Dr. Dr Jarrell,    Please provide us with WRITTEN Cardiac Clearance on Mr/ Mrs/ Ms.   Darius Restrepo.  : 1965.        Please send any test results such as : Recent lab     Patient will be scheduled for a right shoulder diagnostic arthroscopy with possible rotator cuff repair possible biceps tenodesis and subacromial decompression under General/MAC anesthesia scheduled for unknown until clearance.      PLEASE FAX THIS CLEARANCE WITH TEST RESULTS [phone]  Thank you for your help in this matter.    Sincerely,  Stephanie Scott  Phone- [phone]  Fax- [phone]

## 2023-10-16 NOTE — LETTER
Cardiac Risk Assessment Request Form      Date: 10/16/2023      Patient's Name: Darius Restrepo     YOB: 1965    Patient is scheduled to have right shoulder diagnostic arthroscopy with possible rotator cuff repair possible biceps tenodesis and subacromial decompression on unknown waiting on clearance by Delgado Eaton with (check one):    XX General  ___  Local/regional  ___  MAC  ___ Conscious Sedation Anesthesia    Dx: Traumatic complete tear of right rotator cuff (please no ICD-10 code)  Requesting staff name: Stephaniemercedes Raoarchieyoandy GLENROY   Phone number: [phone]        Fax number: [phone]    Check all that apply and complete blanks:    XX  Request for cardiac risk assessment for procedure  ___ Request to hold ____ for ____ days prior to procedure  ___ Pre-procedure antibiotics: Cardiac status information  ___ Major dental procedure  ___  Additional records requested: _________________________________________    ** Please fax document back to [phone] or [phone]  ATTN: [facility] ([facility]) and allow at lease 3 business days to receive a response from [facility].  According to American College of [employer]iology cardiac risk assessment, low risk surgeries do not need cardiac testing or risk stratification. Patients that are asymptomatic should safely proceed with low risk procedures that may include, but are not limited to dental procedure, minor skin procedures, EGD, Colonoscopy or Cataracts.  Symptomatic patients should make an appointment with [facility].

## 2023-10-16 NOTE — PROGRESS NOTES
Faculty Attestation: Darius Restrepo  was seen at [facility] in the Orthopaedic Clinic. Discussed with the resident at the time of the visit. History of Present Illness, Physical Exam, and Assessment and Plan reviewed. Treatment plan is reasonable and appropriate. Compliance with treatment recommendations is important. No procedure was performed.     Jose Lizama MD  Orthopaedic Surgery

## 2023-10-23 ENCOUNTER — HOSPITAL ENCOUNTER (OUTPATIENT)
Dept: CARDIOLOGY | Facility: HOSPITAL | Age: 58
Discharge: HOME | End: 2023-10-23
Attending: FAMILY MEDICINE
Payer: MEDICAID

## 2023-10-23 ENCOUNTER — HOSPITAL ENCOUNTER (OUTPATIENT)
Dept: RADIOLOGY | Facility: HOSPITAL | Age: 58
Discharge: HOME | End: 2023-10-23
Payer: MEDICAID

## 2023-10-23 DIAGNOSIS — Z01.818 OTHER SPECIFIED PRE-OPERATIVE EXAMINATION: Primary | ICD-10-CM

## 2023-10-23 DIAGNOSIS — Z01.818 OTHER SPECIFIED PRE-OPERATIVE EXAMINATION: ICD-10-CM

## 2023-10-23 DIAGNOSIS — E11.9 DIABETES: ICD-10-CM

## 2023-10-23 DIAGNOSIS — I10 UNCONTROLLED HYPERTENSION: ICD-10-CM

## 2023-10-23 DIAGNOSIS — Z01.818 PRE-OP EXAM: ICD-10-CM

## 2023-10-23 PROCEDURE — 93005 ELECTROCARDIOGRAM TRACING: CPT

## 2023-10-23 PROCEDURE — 71046 X-RAY EXAM CHEST 2 VIEWS: CPT | Mod: TC

## 2023-11-03 ENCOUNTER — OFFICE VISIT (OUTPATIENT)
Dept: ORTHOPEDICS | Facility: CLINIC | Age: 58
End: 2023-11-03
Payer: MEDICAID

## 2023-11-03 VITALS — HEIGHT: 66 IN | BODY MASS INDEX: 37.93 KG/M2 | WEIGHT: 236 LBS

## 2023-11-03 DIAGNOSIS — M75.101 TEAR OF RIGHT ROTATOR CUFF, UNSPECIFIED TEAR EXTENT, UNSPECIFIED WHETHER TRAUMATIC: Primary | ICD-10-CM

## 2023-11-03 PROCEDURE — 1159F PR MEDICATION LIST DOCUMENTED IN MEDICAL RECORD: ICD-10-PCS | Mod: CPTII,,, | Performed by: ORTHOPAEDIC SURGERY

## 2023-11-03 PROCEDURE — 3044F HG A1C LEVEL LT 7.0%: CPT | Mod: CPTII,,, | Performed by: ORTHOPAEDIC SURGERY

## 2023-11-03 PROCEDURE — 99213 OFFICE O/P EST LOW 20 MIN: CPT | Mod: PBBFAC

## 2023-11-03 PROCEDURE — 4010F ACE/ARB THERAPY RXD/TAKEN: CPT | Mod: CPTII,,, | Performed by: ORTHOPAEDIC SURGERY

## 2023-11-03 PROCEDURE — 3008F PR BODY MASS INDEX (BMI) DOCUMENTED: ICD-10-PCS | Mod: CPTII,,, | Performed by: ORTHOPAEDIC SURGERY

## 2023-11-03 PROCEDURE — 1159F MED LIST DOCD IN RCRD: CPT | Mod: CPTII,,, | Performed by: ORTHOPAEDIC SURGERY

## 2023-11-03 PROCEDURE — 3008F BODY MASS INDEX DOCD: CPT | Mod: CPTII,,, | Performed by: ORTHOPAEDIC SURGERY

## 2023-11-03 PROCEDURE — 4010F PR ACE/ARB THEARPY RXD/TAKEN: ICD-10-PCS | Mod: CPTII,,, | Performed by: ORTHOPAEDIC SURGERY

## 2023-11-03 PROCEDURE — 99499 UNLISTED E&M SERVICE: CPT | Mod: S$PBB,,, | Performed by: ORTHOPAEDIC SURGERY

## 2023-11-03 PROCEDURE — 99499 NO LOS: ICD-10-PCS | Mod: S$PBB,,, | Performed by: ORTHOPAEDIC SURGERY

## 2023-11-03 PROCEDURE — 3044F PR MOST RECENT HEMOGLOBIN A1C LEVEL <7.0%: ICD-10-PCS | Mod: CPTII,,, | Performed by: ORTHOPAEDIC SURGERY

## 2023-11-03 RX ORDER — CEFAZOLIN SODIUM 2 G/50ML
2 SOLUTION INTRAVENOUS
Status: CANCELLED | OUTPATIENT
Start: 2023-11-03

## 2023-11-03 RX ORDER — MUPIROCIN 20 MG/G
OINTMENT TOPICAL
Status: CANCELLED | OUTPATIENT
Start: 2023-11-03

## 2023-11-03 RX ORDER — SODIUM CHLORIDE 9 MG/ML
INJECTION, SOLUTION INTRAVENOUS CONTINUOUS
Status: CANCELLED | OUTPATIENT
Start: 2023-11-03

## 2023-11-03 NOTE — PROGRESS NOTES
Faculty Attestation: Darius Restrepo  was seen at [facility] in the Orthopaedic Clinic. Patient chart reviewed. History of Present Illness, Physical Exam, and Assessment and Plan reviewed. Treatment plan is reasonable and appropriate. Compliance with treatment recommendations is important. No procedure was performed.     Kishan Fried MD  Orthopaedic Surgery

## 2023-11-03 NOTE — PROGRESS NOTES
[facility]  Established Patient Office Visit  11/03/2023       Patient ID: Darius Restrepo  YOB: 1965  MRN: [id number]    Chief Complaint: Pain of the Right Shoulder      Past Orthopaedic Surgeries:  Prior fixation of his finger, prior knee arthroscopy    HPI:  Darius Restrepo is a 58 y.o. male with history of pack per day smoking, knee osteoarthritis, stroke, AFib, myocardial infarction, pacemaker presenting with multiple month history of right shoulder pain and weakness.  Patient does not recall any acute event.  Does not have any paresthesias in the right upper extremity.  He has significant pain and weakness with shoulder range of motion especially with overhead reach.  He is no longer able to reach over his head do any overhead activities.  He does not have any symptoms on the left side.  He has attempted treatment with physical therapy and injections without any relief.  MRI has shown full-thickness tearing of the supraspinatus and infraspinatus he is not used any IV drugs in is not a daily drinker.    Past Medical History:    Past Medical History:   Diagnosis Date    A-fib     Bipolar affective disorder, mixed     CVA (cerebral vascular accident)     Depression     Epilepsy     GERD (gastroesophageal reflux disease)     Hyperlipidemia     Hypertension     KRISTIN on CPAP     Pacemaker     TIA (transient ischemic attack)      Past Surgical History:   Procedure Laterality Date    A-V CARDIAC PACEMAKER INSERTION N/A 12/02/2022    Procedure: INSERTION, CARDIAC PACEMAKER, DUAL CHAMBER;  Surgeon: Hemant Maravilla DO;  Location: [facility] CATH LAB;  Service: Cardiology;  Laterality: N/A;  EXPLANT OF EVENT RECORDER /  IMPLANT OF DUAL CHAMBER PPM (SJM)    FEMUR IM MARITO REMOVAL      HIP SURGERY Left     Marito inserted from knee to hip    INSERTION OF IMPLANTABLE LOOP RECORDER  03/25/2022    Dr. Maravilla    INSERTION OF PERMANENT PACEMAKER  12/02/2022    Dr. Maravilla    INTRAMEDULLARY RODDING OF  FEMUR      OPEN REDUCTION AND INTERNAL FIXATION (ORIF) OF INJURY OF THUMB      REMOVAL OF IMPLANTABLE LOOP RECORDER  12/02/2022    Dr. Maravilla    SKIN TAG REMOVAL      THUMB SURGERY       Family History   Problem Relation Age of Onset    Hypertension Mother     Hypertension Sister     Diabetes Sister     Hypertension Sister     Hypertension Sister     Hypertension Sister     Hypertension Brother     Hypertension Brother     Hypertension Brother     Diabetes Paternal Grandmother      Social History     Socioeconomic History    Marital status:    Tobacco Use    Smoking status: Every Day     Current packs/day: 1.00     Types: Cigarettes    Smokeless tobacco: Never    Tobacco comments:     Nicotine patches used    Substance and Sexual Activity    Alcohol use: Not Currently     Comment: 1-2 times weekly    Drug use: Not Currently    Sexual activity: Yes     Medication List with Changes/Refills   Current Medications    ALPRAZOLAM (XANAX) 0.5 MG TABLET    TAKE ONE TABLET BY MOUTH TWICE A DAY AS NEEDED FOR ANXIETY    AMLODIPINE (NORVASC) 10 MG TABLET    Take 10 mg by mouth once daily.    ATORVASTATIN (LIPITOR) 20 MG TABLET    Take 20 mg by mouth once daily.    BRIVIACT 100 MG TAB    Take 100 mg by mouth 2 (two) times daily.    CARVEDILOL (COREG) 12.5 MG TABLET    Take 12.5 mg by mouth 2 (two) times daily.    CHOLECALCIFEROL, VITAMIN D3, 1,250 MCG (50,000 UNIT) CAPSULE    Take 1 capsule (50,000 Units total) by mouth every 7 days.    CLONIDINE 0.3 MG/24 HR TD PTWK (CATAPRES) 0.3 MG/24 HR    Place 1 patch onto the skin every 7 days.    CYCLOBENZAPRINE (FLEXERIL) 10 MG TABLET    Take 1 tablet (10 mg total) by mouth every evening.    DICLOFENAC (VOLTAREN) 50 MG EC TABLET    Take 1 tablet (50 mg total) by mouth 2 (two) times daily as needed (pain, after food).    EPINEPHRINE (EPIPEN) 0.3 MG/0.3 ML ATIN    Inject 0.3 mLs (0.3 mg total) into the muscle as needed (allergic reaction).    ESCITALOPRAM OXALATE (LEXAPRO) 20 MG  TABLET    Take 0.5 tablets (10 mg total) by mouth once daily. After breakfast    FOLIC ACID (FOLVITE) 1 MG TABLET    Take 1 tablet (1 mg total) by mouth once daily. After food    HYDROCHLOROTHIAZIDE (HYDRODIURIL) 12.5 MG TAB    Take 12.5 mg by mouth once daily.    HYDROCODONE-ACETAMINOPHEN (NORCO)  MG PER TABLET    TAKE ONE TABLET BY MOUTH EVERY 8 HOURS AS NEEDED FOR PAIN    HYDROXYCHLOROQUINE (PLAQUENIL) 200 MG TABLET    Take 1 tablet (200 mg total) by mouth 2 (two) times daily.    LAMOTRIGINE (LAMICTAL) 200 MG TABLET    Take 200 mg by mouth 2 (two) times daily.    LEVETIRACETAM (KEPPRA) 1000 MG TABLET    Take 1,000 mg by mouth 3 (three) times daily.    LOSARTAN (COZAAR) 50 MG TABLET    Take 50 mg by mouth once daily.    MAGNESIUM OXIDE (MAG-OX) 400 MG (241.3 MG MAGNESIUM) TABLET    Take 1 tablet (400 mg total) by mouth nightly.    METHOTREXATE 2.5 MG TAB    Take 6 tablets (15 mg total) by mouth every 7 days. EVERY  MONDAY  TAKE 3 TAB AFTER BREAKFAST AND 3 TAB AFTER SUPPER    MUPIROCIN (BACTROBAN) 2 % OINTMENT    Apply topically 3 (three) times daily.    PREGABALIN (LYRICA) 100 MG CAPSULE    Take 1 capsule (100 mg total) by mouth 3 (three) times daily.    RISPERIDONE (RISPERDAL) 1 MG TABLET    Take 1 mg by mouth 2 (two) times daily.    SERTRALINE (ZOLOFT) 100 MG TABLET    Take 100 mg by mouth once daily.     Review of patient's allergies indicates:   Allergen Reactions    Peanut Anaphylaxis       Physical Exam:    Right shoulder   No tenderness to palpation of the AC joint   Tenderness to palpation at the biceps groove   Active range of motion limited to 40° of abduction, 40° of forward flexion, 20° of external rotation, internal rotation to the sacrum   Passive range of motion 100° of abduction 100° of forward flexion 50° of external rotation  Mckinley positive, Ruthy's positive  Speed's positive Tano some positive  Significant weakness with empty can shoulder abduction forward flexion and external  rotation  Motor and sensory intact throughout the right upper extremity    Imaging:  X-rays of the right shoulder show well-preserved joint space no significant osteophytes some mild superior migration humeral head    MRI shows full-thickness tear of the supraspinatus with significant retraction, full-thickness tear of the infraspinatus some tendinosis of the subscapularis with tearing and biceps tendinosis    Assessment and Plan:    Darius Restrepo is a 58 y.o. male with very complicated medical history with right shoulder rotator cuff tear and biceps pathology.  He is failed conservative management.  - patient has received clearance from Cardiology and his primary care both at low risk.  He is not on any anticoagulation at this time.  -11/30/2023: Right shoulder arthroscopy, rotator cuff repair, biceps tenotomy versus tenodesis    Guillermina Luo MD

## 2023-11-03 NOTE — H&P (VIEW-ONLY)
[facility]  Established Patient Office Visit  11/03/2023       Patient ID: Darius Restrepo  YOB: 1965  MRN: [id number]    Chief Complaint: Pain of the Right Shoulder      Past Orthopaedic Surgeries:  Prior fixation of his finger, prior knee arthroscopy    HPI:  Darius Restrepo is a 58 y.o. male with history of pack per day smoking, knee osteoarthritis, stroke, AFib, myocardial infarction, pacemaker presenting with multiple month history of right shoulder pain and weakness.  Patient does not recall any acute event.  Does not have any paresthesias in the right upper extremity.  He has significant pain and weakness with shoulder range of motion especially with overhead reach.  He is no longer able to reach over his head do any overhead activities.  He does not have any symptoms on the left side.  He has attempted treatment with physical therapy and injections without any relief.  MRI has shown full-thickness tearing of the supraspinatus and infraspinatus he is not used any IV drugs in is not a daily drinker.    Past Medical History:    Past Medical History:   Diagnosis Date    A-fib     Bipolar affective disorder, mixed     CVA (cerebral vascular accident)     Depression     Epilepsy     GERD (gastroesophageal reflux disease)     Hyperlipidemia     Hypertension     KRISTIN on CPAP     Pacemaker     TIA (transient ischemic attack)      Past Surgical History:   Procedure Laterality Date    A-V CARDIAC PACEMAKER INSERTION N/A 12/02/2022    Procedure: INSERTION, CARDIAC PACEMAKER, DUAL CHAMBER;  Surgeon: Hemant Maravilla DO;  Location: [facility] CATH LAB;  Service: Cardiology;  Laterality: N/A;  EXPLANT OF EVENT RECORDER /  IMPLANT OF DUAL CHAMBER PPM (SJM)    FEMUR IM MARITO REMOVAL      HIP SURGERY Left     Marito inserted from knee to hip    INSERTION OF IMPLANTABLE LOOP RECORDER  03/25/2022    Dr. Maravilla    INSERTION OF PERMANENT PACEMAKER  12/02/2022    Dr. Maravilla    INTRAMEDULLARY RODDING OF  FEMUR      OPEN REDUCTION AND INTERNAL FIXATION (ORIF) OF INJURY OF THUMB      REMOVAL OF IMPLANTABLE LOOP RECORDER  12/02/2022    Dr. Maravilla    SKIN TAG REMOVAL      THUMB SURGERY       Family History   Problem Relation Age of Onset    Hypertension Mother     Hypertension Sister     Diabetes Sister     Hypertension Sister     Hypertension Sister     Hypertension Sister     Hypertension Brother     Hypertension Brother     Hypertension Brother     Diabetes Paternal Grandmother      Social History     Socioeconomic History    Marital status:    Tobacco Use    Smoking status: Every Day     Current packs/day: 1.00     Types: Cigarettes    Smokeless tobacco: Never    Tobacco comments:     Nicotine patches used    Substance and Sexual Activity    Alcohol use: Not Currently     Comment: 1-2 times weekly    Drug use: Not Currently    Sexual activity: Yes     Medication List with Changes/Refills   Current Medications    ALPRAZOLAM (XANAX) 0.5 MG TABLET    TAKE ONE TABLET BY MOUTH TWICE A DAY AS NEEDED FOR ANXIETY    AMLODIPINE (NORVASC) 10 MG TABLET    Take 10 mg by mouth once daily.    ATORVASTATIN (LIPITOR) 20 MG TABLET    Take 20 mg by mouth once daily.    BRIVIACT 100 MG TAB    Take 100 mg by mouth 2 (two) times daily.    CARVEDILOL (COREG) 12.5 MG TABLET    Take 12.5 mg by mouth 2 (two) times daily.    CHOLECALCIFEROL, VITAMIN D3, 1,250 MCG (50,000 UNIT) CAPSULE    Take 1 capsule (50,000 Units total) by mouth every 7 days.    CLONIDINE 0.3 MG/24 HR TD PTWK (CATAPRES) 0.3 MG/24 HR    Place 1 patch onto the skin every 7 days.    CYCLOBENZAPRINE (FLEXERIL) 10 MG TABLET    Take 1 tablet (10 mg total) by mouth every evening.    DICLOFENAC (VOLTAREN) 50 MG EC TABLET    Take 1 tablet (50 mg total) by mouth 2 (two) times daily as needed (pain, after food).    EPINEPHRINE (EPIPEN) 0.3 MG/0.3 ML ATIN    Inject 0.3 mLs (0.3 mg total) into the muscle as needed (allergic reaction).    ESCITALOPRAM OXALATE (LEXAPRO) 20 MG  TABLET    Take 0.5 tablets (10 mg total) by mouth once daily. After breakfast    FOLIC ACID (FOLVITE) 1 MG TABLET    Take 1 tablet (1 mg total) by mouth once daily. After food    HYDROCHLOROTHIAZIDE (HYDRODIURIL) 12.5 MG TAB    Take 12.5 mg by mouth once daily.    HYDROCODONE-ACETAMINOPHEN (NORCO)  MG PER TABLET    TAKE ONE TABLET BY MOUTH EVERY 8 HOURS AS NEEDED FOR PAIN    HYDROXYCHLOROQUINE (PLAQUENIL) 200 MG TABLET    Take 1 tablet (200 mg total) by mouth 2 (two) times daily.    LAMOTRIGINE (LAMICTAL) 200 MG TABLET    Take 200 mg by mouth 2 (two) times daily.    LEVETIRACETAM (KEPPRA) 1000 MG TABLET    Take 1,000 mg by mouth 3 (three) times daily.    LOSARTAN (COZAAR) 50 MG TABLET    Take 50 mg by mouth once daily.    MAGNESIUM OXIDE (MAG-OX) 400 MG (241.3 MG MAGNESIUM) TABLET    Take 1 tablet (400 mg total) by mouth nightly.    METHOTREXATE 2.5 MG TAB    Take 6 tablets (15 mg total) by mouth every 7 days. EVERY  MONDAY  TAKE 3 TAB AFTER BREAKFAST AND 3 TAB AFTER SUPPER    MUPIROCIN (BACTROBAN) 2 % OINTMENT    Apply topically 3 (three) times daily.    PREGABALIN (LYRICA) 100 MG CAPSULE    Take 1 capsule (100 mg total) by mouth 3 (three) times daily.    RISPERIDONE (RISPERDAL) 1 MG TABLET    Take 1 mg by mouth 2 (two) times daily.    SERTRALINE (ZOLOFT) 100 MG TABLET    Take 100 mg by mouth once daily.     Review of patient's allergies indicates:   Allergen Reactions    Peanut Anaphylaxis       Physical Exam:    Right shoulder   No tenderness to palpation of the AC joint   Tenderness to palpation at the biceps groove   Active range of motion limited to 40° of abduction, 40° of forward flexion, 20° of external rotation, internal rotation to the sacrum   Passive range of motion 100° of abduction 100° of forward flexion 50° of external rotation  Mckinley positive, Ruthy's positive  Speed's positive Tano some positive  Significant weakness with empty can shoulder abduction forward flexion and external  rotation  Motor and sensory intact throughout the right upper extremity    Imaging:  X-rays of the right shoulder show well-preserved joint space no significant osteophytes some mild superior migration humeral head    MRI shows full-thickness tear of the supraspinatus with significant retraction, full-thickness tear of the infraspinatus some tendinosis of the subscapularis with tearing and biceps tendinosis    Assessment and Plan:    Darius Restrepo is a 58 y.o. male with very complicated medical history with right shoulder rotator cuff tear and biceps pathology.  He is failed conservative management.  - patient has received clearance from Cardiology and his primary care both at low risk.  He is not on any anticoagulation at this time.  -11/30/2023: Right shoulder arthroscopy, rotator cuff repair, biceps tenotomy versus tenodesis    Guillermina Luo MD

## 2023-11-06 DIAGNOSIS — I10 PRIMARY HYPERTENSION: ICD-10-CM

## 2023-11-06 DIAGNOSIS — M75.100 TEAR OF ROTATOR CUFF, UNSPECIFIED LATERALITY, UNSPECIFIED TEAR EXTENT, UNSPECIFIED WHETHER TRAUMATIC: ICD-10-CM

## 2023-11-06 DIAGNOSIS — M79.7 FIBROMYALGIA: ICD-10-CM

## 2023-11-06 DIAGNOSIS — M35.9 UNDIFFERENTIATED CONNECTIVE TISSUE DISEASE: ICD-10-CM

## 2023-11-06 DIAGNOSIS — F32.A DEPRESSIVE DISORDER: ICD-10-CM

## 2023-11-06 DIAGNOSIS — F51.01 PRIMARY INSOMNIA: ICD-10-CM

## 2023-11-07 RX ORDER — HYDROCODONE BITARTRATE AND ACETAMINOPHEN 10; 325 MG/1; MG/1
TABLET ORAL
Qty: 90 TABLET | Refills: 0 | Status: SHIPPED | OUTPATIENT
Start: 2023-11-07 | End: 2023-11-09 | Stop reason: SDUPTHER

## 2023-11-09 DIAGNOSIS — M35.9 UNDIFFERENTIATED CONNECTIVE TISSUE DISEASE: ICD-10-CM

## 2023-11-09 DIAGNOSIS — I10 PRIMARY HYPERTENSION: ICD-10-CM

## 2023-11-09 DIAGNOSIS — M75.100 TEAR OF ROTATOR CUFF, UNSPECIFIED LATERALITY, UNSPECIFIED TEAR EXTENT, UNSPECIFIED WHETHER TRAUMATIC: ICD-10-CM

## 2023-11-09 DIAGNOSIS — F32.A DEPRESSIVE DISORDER: ICD-10-CM

## 2023-11-09 DIAGNOSIS — F51.01 PRIMARY INSOMNIA: ICD-10-CM

## 2023-11-09 DIAGNOSIS — M79.7 FIBROMYALGIA: ICD-10-CM

## 2023-11-09 RX ORDER — HYDROCODONE BITARTRATE AND ACETAMINOPHEN 10; 325 MG/1; MG/1
1 TABLET ORAL EVERY 8 HOURS PRN
Qty: 90 TABLET | Refills: 0 | Status: SHIPPED | OUTPATIENT
Start: 2023-11-09 | End: 2023-11-09 | Stop reason: SDUPTHER

## 2023-11-09 NOTE — TELEPHONE ENCOUNTER
Patient sates that pharmacy is not taking new patients. They would like it sent to [facility] in [place] Please.

## 2023-11-09 NOTE — TELEPHONE ENCOUNTER
LOV: 09/28/2023    Patient states none of the [facility] have this medication in stock and they do not know when they will get it. The requested it be sent  to pharmacy below please.  Thanks you.

## 2023-11-10 DIAGNOSIS — M79.7 FIBROMYALGIA: ICD-10-CM

## 2023-11-10 DIAGNOSIS — I10 PRIMARY HYPERTENSION: ICD-10-CM

## 2023-11-10 DIAGNOSIS — M35.9 UNDIFFERENTIATED CONNECTIVE TISSUE DISEASE: ICD-10-CM

## 2023-11-10 DIAGNOSIS — F32.A DEPRESSIVE DISORDER: ICD-10-CM

## 2023-11-10 DIAGNOSIS — F51.01 PRIMARY INSOMNIA: ICD-10-CM

## 2023-11-10 DIAGNOSIS — M75.100 TEAR OF ROTATOR CUFF, UNSPECIFIED LATERALITY, UNSPECIFIED TEAR EXTENT, UNSPECIFIED WHETHER TRAUMATIC: ICD-10-CM

## 2023-11-10 RX ORDER — HYDROCODONE BITARTRATE AND ACETAMINOPHEN 10; 325 MG/1; MG/1
1 TABLET ORAL EVERY 8 HOURS PRN
Qty: 90 TABLET | Refills: 0 | Status: SHIPPED | OUTPATIENT
Start: 2023-11-10

## 2023-11-17 RX ORDER — ALPRAZOLAM 0.5 MG/1
TABLET ORAL
Qty: 60 TABLET | Refills: 0 | OUTPATIENT
Start: 2023-11-17

## 2023-11-20 NOTE — TELEPHONE ENCOUNTER
Called and left a message for the patient to call back. He needs to get with his PCP to get a refill on his Xanax as it was refused.

## 2023-11-28 RX ORDER — OXYCODONE AND ACETAMINOPHEN 5; 325 MG/1; MG/1
2 TABLET ORAL ONCE
Status: CANCELLED | OUTPATIENT
Start: 2023-11-28 | End: 2023-11-28

## 2023-11-28 RX ORDER — ONDANSETRON 2 MG/ML
4 INJECTION INTRAMUSCULAR; INTRAVENOUS ONCE
Status: CANCELLED | OUTPATIENT
Start: 2023-11-28 | End: 2023-11-28

## 2023-11-28 RX ORDER — HYDROMORPHONE HYDROCHLORIDE 1 MG/ML
0.5 INJECTION, SOLUTION INTRAMUSCULAR; INTRAVENOUS; SUBCUTANEOUS EVERY 5 MIN PRN
Status: CANCELLED | OUTPATIENT
Start: 2023-11-28

## 2023-11-28 RX ORDER — HYDROMORPHONE HYDROCHLORIDE 1 MG/ML
0.2 INJECTION, SOLUTION INTRAMUSCULAR; INTRAVENOUS; SUBCUTANEOUS EVERY 5 MIN PRN
Status: CANCELLED | OUTPATIENT
Start: 2023-11-28

## 2023-11-28 RX ORDER — IPRATROPIUM BROMIDE AND ALBUTEROL SULFATE 2.5; .5 MG/3ML; MG/3ML
3 SOLUTION RESPIRATORY (INHALATION) ONCE AS NEEDED
Status: CANCELLED | OUTPATIENT
Start: 2023-11-28 | End: 2035-04-26

## 2023-11-28 RX ORDER — DIPHENHYDRAMINE HYDROCHLORIDE 50 MG/ML
25 INJECTION INTRAMUSCULAR; INTRAVENOUS ONCE AS NEEDED
Status: CANCELLED | OUTPATIENT
Start: 2023-11-28 | End: 2035-04-26

## 2023-11-28 RX ORDER — MEPERIDINE HYDROCHLORIDE 25 MG/ML
12.5 INJECTION INTRAMUSCULAR; INTRAVENOUS; SUBCUTANEOUS ONCE
Status: CANCELLED | OUTPATIENT
Start: 2023-11-28 | End: 2023-11-28

## 2023-11-28 RX ORDER — PROCHLORPERAZINE EDISYLATE 5 MG/ML
5 INJECTION INTRAMUSCULAR; INTRAVENOUS ONCE AS NEEDED
Status: CANCELLED | OUTPATIENT
Start: 2023-11-28 | End: 2035-04-26

## 2023-11-30 ENCOUNTER — HOSPITAL ENCOUNTER (OUTPATIENT)
Facility: HOSPITAL | Age: 58
Discharge: HOME | End: 2023-11-30
Attending: ORTHOPAEDIC SURGERY | Admitting: ORTHOPAEDIC SURGERY
Payer: MEDICAID

## 2023-11-30 VITALS
SYSTOLIC BLOOD PRESSURE: 138 MMHG | OXYGEN SATURATION: 97 % | DIASTOLIC BLOOD PRESSURE: 84 MMHG | BODY MASS INDEX: 37.41 KG/M2 | WEIGHT: 231.81 LBS | TEMPERATURE: 98 F | HEART RATE: 77 BPM

## 2023-11-30 DIAGNOSIS — M75.101 TEAR OF RIGHT ROTATOR CUFF, UNSPECIFIED TEAR EXTENT, UNSPECIFIED WHETHER TRAUMATIC: ICD-10-CM

## 2023-11-30 PROCEDURE — 99499 NO LOS: ICD-10-PCS | Mod: ,,, | Performed by: ORTHOPAEDIC SURGERY

## 2023-11-30 PROCEDURE — 99499 UNLISTED E&M SERVICE: CPT | Mod: ,,, | Performed by: ORTHOPAEDIC SURGERY

## 2023-11-30 RX ORDER — MUPIROCIN 20 MG/G
OINTMENT TOPICAL
Status: DISCONTINUED | OUTPATIENT
Start: 2023-11-30 | End: 2023-11-30 | Stop reason: HOSPADM

## 2023-11-30 RX ORDER — MIDAZOLAM HYDROCHLORIDE 1 MG/ML
5 INJECTION INTRAMUSCULAR; INTRAVENOUS ONCE
Status: ACTIVE | OUTPATIENT
Start: 2023-11-30

## 2023-11-30 RX ORDER — SODIUM CHLORIDE, SODIUM LACTATE, POTASSIUM CHLORIDE, CALCIUM CHLORIDE 600; 310; 30; 20 MG/100ML; MG/100ML; MG/100ML; MG/100ML
INJECTION, SOLUTION INTRAVENOUS CONTINUOUS
Status: ACTIVE | OUTPATIENT
Start: 2023-11-30

## 2023-11-30 RX ORDER — SODIUM CHLORIDE 9 MG/ML
INJECTION, SOLUTION INTRAVENOUS CONTINUOUS
Status: DISCONTINUED | OUTPATIENT
Start: 2023-11-30 | End: 2023-11-30 | Stop reason: HOSPADM

## 2024-01-22 ENCOUNTER — OFFICE VISIT (OUTPATIENT)
Dept: ORTHOPEDICS | Facility: CLINIC | Age: 59
End: 2024-01-22
Payer: MEDICAID

## 2024-01-22 VITALS — WEIGHT: 231 LBS | TEMPERATURE: 98 F | HEIGHT: 66 IN | BODY MASS INDEX: 37.12 KG/M2

## 2024-01-22 DIAGNOSIS — M75.101 TEAR OF RIGHT ROTATOR CUFF, UNSPECIFIED TEAR EXTENT, UNSPECIFIED WHETHER TRAUMATIC: Primary | ICD-10-CM

## 2024-01-22 DIAGNOSIS — M67.911 ROTATOR CUFF DYSFUNCTION, RIGHT: ICD-10-CM

## 2024-01-22 PROCEDURE — 99215 OFFICE O/P EST HI 40 MIN: CPT | Mod: PBBFAC

## 2024-01-22 PROCEDURE — 99214 OFFICE O/P EST MOD 30 MIN: CPT | Mod: S$PBB,,, | Performed by: REHABILITATION UNIT

## 2024-01-22 RX ORDER — MUPIROCIN 20 MG/G
OINTMENT TOPICAL
Status: CANCELLED | OUTPATIENT
Start: 2024-01-22

## 2024-01-22 RX ORDER — SODIUM CHLORIDE 9 MG/ML
INJECTION, SOLUTION INTRAVENOUS CONTINUOUS
Status: CANCELLED | OUTPATIENT
Start: 2024-01-22

## 2024-01-22 NOTE — PROGRESS NOTES
[facility]  Established Patient Office Visit  01/22/2024       Patient ID: Darius Restrepo  YOB: 1965  MRN: [id number]    Chief Complaint: Pain of the Right Shoulder (Patient is here today for follow up for  right shoulder.  Has been taking meds; pain: NSAID Which has as needed for pain, which does help.  )    Past Orthopaedic Surgeries:  Prior fixation of his finger, prior knee arthroscopy    HPI:  Darius Restrepo is a 59 y.o. male with history of pack per day smoking, knee osteoarthritis, stroke, AFib, myocardial infarction, pacemaker presenting with multiple month history of right shoulder pain and weakness.  He recalls a fall around 1 year ago in which she sustained several injuries including his right shoulder.  He has had pain and decreased function since that time.  He has poor active motion and strength with good passive motion.  He has failed physical therapy, injections, medication, and avoiding aggravating activities.  He was initially scheduled for surgery in November, but needed to obtain cardiac clearance prior to this.  This has been completed and he is here to schedule his surgery.     Past Medical History:    Past Medical History:   Diagnosis Date    A-fib     Bipolar affective disorder, mixed     CVA (cerebral vascular accident)     Depression     Epilepsy     GERD (gastroesophageal reflux disease)     Hyperlipidemia     Hypertension     KRISTIN on CPAP     Pacemaker     TIA (transient ischemic attack)      Past Surgical History:   Procedure Laterality Date    A-V CARDIAC PACEMAKER INSERTION N/A 12/02/2022    Procedure: INSERTION, CARDIAC PACEMAKER, DUAL CHAMBER;  Surgeon: Hemant Maravilla DO;  Location: [facility] CATH LAB;  Service: Cardiology;  Laterality: N/A;  EXPLANT OF EVENT RECORDER /  IMPLANT OF DUAL CHAMBER PPM (SJM)    FEMUR IM MARITO REMOVAL      HIP SURGERY Left     Marito inserted from knee to hip    INSERTION OF IMPLANTABLE LOOP RECORDER  03/25/2022      Taiwo    INSERTION OF PERMANENT PACEMAKER  12/02/2022    Dr. Maravilla    INTRAMEDULLARY RODDING OF FEMUR      OPEN REDUCTION AND INTERNAL FIXATION (ORIF) OF INJURY OF THUMB      REMOVAL OF IMPLANTABLE LOOP RECORDER  12/02/2022    Dr. Maravilla    SKIN TAG REMOVAL      THUMB SURGERY       Family History   Problem Relation Age of Onset    Hypertension Mother     Hypertension Sister     Diabetes Sister     Hypertension Sister     Hypertension Sister     Hypertension Sister     Hypertension Brother     Hypertension Brother     Hypertension Brother     Diabetes Paternal Grandmother      Social History     Socioeconomic History    Marital status:    Tobacco Use    Smoking status: Every Day     Current packs/day: 1.00     Average packs/day: 1 pack/day for 25.1 years (25.1 ttl pk-yrs)     Types: Cigarettes     Start date: 11/28/1998    Smokeless tobacco: Never    Tobacco comments:     Nicotine patches used    Substance and Sexual Activity    Alcohol use: Not Currently     Comment: 1-2 times weekly    Drug use: Not Currently    Sexual activity: Yes     Medication List with Changes/Refills   Current Medications    ALPRAZOLAM (XANAX) 0.5 MG TABLET    TAKE ONE TABLET BY MOUTH TWICE A DAY AS NEEDED FOR ANXIETY    AMLODIPINE (NORVASC) 10 MG TABLET    Take 10 mg by mouth once daily.    ATORVASTATIN (LIPITOR) 20 MG TABLET    Take 20 mg by mouth once daily.    BRIVIACT 100 MG TAB    Take 100 mg by mouth 2 (two) times daily.    CARVEDILOL (COREG) 12.5 MG TABLET    Take 12.5 mg by mouth 2 (two) times daily.    CHOLECALCIFEROL, VITAMIN D3, 1,250 MCG (50,000 UNIT) CAPSULE    Take 1 capsule (50,000 Units total) by mouth every 7 days.    CLONIDINE 0.3 MG/24 HR TD PTWK (CATAPRES) 0.3 MG/24 HR    Place 1 patch onto the skin every 7 days.    CYCLOBENZAPRINE (FLEXERIL) 10 MG TABLET    Take 1 tablet (10 mg total) by mouth every evening.    DICLOFENAC (VOLTAREN) 50 MG EC TABLET    Take 1 tablet (50 mg total) by mouth 2 (two) times daily as  needed (pain, after food).    EPINEPHRINE (EPIPEN) 0.3 MG/0.3 ML ATIN    Inject 0.3 mLs (0.3 mg total) into the muscle as needed (allergic reaction).    ESCITALOPRAM OXALATE (LEXAPRO) 20 MG TABLET    Take 0.5 tablets (10 mg total) by mouth once daily. After breakfast    FOLIC ACID (FOLVITE) 1 MG TABLET    Take 1 tablet (1 mg total) by mouth once daily. After food    HYDROCHLOROTHIAZIDE (HYDRODIURIL) 12.5 MG TAB    Take 12.5 mg by mouth once daily.    HYDROCODONE-ACETAMINOPHEN (NORCO)  MG PER TABLET    Take 1 tablet by mouth every 8 (eight) hours as needed.    HYDROXYCHLOROQUINE (PLAQUENIL) 200 MG TABLET    Take 1 tablet (200 mg total) by mouth 2 (two) times daily.    LAMOTRIGINE (LAMICTAL) 200 MG TABLET    Take 200 mg by mouth 2 (two) times daily.    LEVETIRACETAM (KEPPRA) 1000 MG TABLET    Take 1,000 mg by mouth 3 (three) times daily.    LOSARTAN (COZAAR) 50 MG TABLET    Take 50 mg by mouth once daily.    MAGNESIUM OXIDE (MAG-OX) 400 MG (241.3 MG MAGNESIUM) TABLET    Take 1 tablet (400 mg total) by mouth nightly.    METHOTREXATE 2.5 MG TAB    Take 6 tablets (15 mg total) by mouth every 7 days. EVERY  MONDAY  TAKE 3 TAB AFTER BREAKFAST AND 3 TAB AFTER SUPPER    MUPIROCIN (BACTROBAN) 2 % OINTMENT    Apply topically 3 (three) times daily.    PREGABALIN (LYRICA) 100 MG CAPSULE    Take 1 capsule (100 mg total) by mouth 3 (three) times daily.    RISPERIDONE (RISPERDAL) 1 MG TABLET    Take 1 mg by mouth 2 (two) times daily.    SERTRALINE (ZOLOFT) 100 MG TABLET    Take 100 mg by mouth once daily.     Review of patient's allergies indicates:   Allergen Reactions    Peanut Anaphylaxis       Physical Exam:    Head: Normocephalic, without obvious abnormality, atraumatic  Eyes: conjunctivae/corneas clear. EOM's intact  Ears: normal external appearance  Nose: Nares normal. Septum midline. Mucosa normal. No drainage  Throat: normal findings: lips normal without lesions  Lungs: unlabored breathing on room air  Chest wall:  symmetric chest rise  Heart: regular rate and rhythm  Pulses: 2+ and symmetric  Skin: Skin color, texture, turgor normal. No rashes or lesions  Neurologic: Grossly normal    Right shoulder   No tenderness to palpation of the AC joint   Tenderness to palpation at the biceps groove   Active range of motion limited to 90° of abduction, 100° of forward flexion, 40° of external rotation, internal rotation to the sacrum   Passive range of motion 130° of abduction, 160° of forward flexion 50° of external rotation  Mckinley positive, Ruthy's positive  Speed's positive    Significant weakness with empty can shoulder abduction forward flexion and external rotation  Motor and sensory intact throughout the right upper extremity    Imaging:  X-rays of the right shoulder show well-preserved joint space no significant osteophytes some mild superior migration humeral head    MRI shows full-thickness tear of the supraspinatus with significant retraction, full-thickness tear of the infraspinatus some tendinosis of the subscapularis with tearing and biceps tendinosis    Assessment and Plan:    Darius Restrepo is a 59 y.o. male with complicated medical history with right shoulder rotator cuff tear and biceps pathology.  He has failed conservative management.  He was initially scheduled for surgery this fall, but required further cardiac workup.  He saw Cardiology on 12/01/2023 and no further workup from a cardiac standpoint was required. We discussed operative and nonoperative options. The patient had an opportunity to ask questions. All questions were answered. The risks and benefits of surgery were discussed with the patient, including but not limited to bleeding, infection, damage to surrounding nerves and structures, need for further surgery, repair failure, anesthesia risk, progression of arthritis, blood clots, and medical risks of surgery. The patient voiced understanding of the risks and benefits and provided written consent to  the procedure. Plan for right shoulder arthroscopic rotator cuff repair, biceps tenotomy versus tenodesis, and any indicated procedures. Patient will be scheduled for surgery at a mutually convenient date in the near future - February 27.  Surgery and anticipated recovery course were discussed.  All his questions were answered.  He was happy and in agreement with the plan.

## 2024-02-21 ENCOUNTER — ANESTHESIA EVENT (OUTPATIENT)
Dept: SURGERY | Facility: HOSPITAL | Age: 59
End: 2024-02-21
Payer: MEDICAID

## 2024-02-21 NOTE — ANESTHESIA PREPROCEDURE EVALUATION
Darius Restrepo is a 59 y.o. male presenting for REPAIR, ROTATOR CUFF (Right: Shoulder) with a history of right shoulder pain  -CVA symptoms 11/2021 with MRI Brain (11/1/21, No overt acute intracranial finding)    Vitals:    02/27/24 0506 02/27/24 0513 02/27/24 0532 02/27/24 0612   BP:  (!) 141/97 (!) 141/97 136/85   Pulse:  63  64   Resp:    20   Temp:  36.7 °C (98.1 °F)  36 °C (96.8 °F)   TempSrc:  Oral  Temporal   SpO2:  100%  100%   Weight: 107.2 kg (236 lb 6.4 oz)           -HTN  -seizures (ER Jan, Feb, and March 2023 with sz activity. On Keppra and Lamictal)  -MSSA L femur w/ infected hardware and osteo 2016  -KRISTIN on CPAP  -PPM  -Bipolar/depression  -connective tissue dz/fibromyalgia (last Rheum 9/28/23)  -shingles 1/2024  -smoker 1PPD  -peanut allergy  -h/o Hep C (tx hx unknown; nL LFTs 10/23/23)  ? KRISTIN     BETA-BLOCKER: COREG         Last dose: 0415    New Orders for Anesthesia: None     Patient Active Problem List   Diagnosis    Depressive disorder    Fibromyalgia    Hypertension    Insomnia    Tear of rotator cuff    Undifferentiated connective tissue disease    Decreased ROM of right shoulder    Decreased strength of upper extremity    Chronic right shoulder pain    Persistent cough    Sinoatrial node dysfunction    Status post placement of implantable loop recorder    Traumatic complete tear of right rotator cuff    BMI 38.0-38.9,adult       Past Surgical History:   Procedure Laterality Date    A-V CARDIAC PACEMAKER INSERTION N/A 12/02/2022    Procedure: INSERTION, CARDIAC PACEMAKER, DUAL CHAMBER;  Surgeon: Hemant Maravilla DO;  Location: [facility] CATH LAB;  Service: Cardiology;  Laterality: N/A;  EXPLANT OF EVENT RECORDER /  IMPLANT OF DUAL CHAMBER PPM (SJM)    FEMUR IM MARITO REMOVAL      HIP SURGERY Left     Marito inserted from knee to hip    INSERTION OF IMPLANTABLE LOOP RECORDER  03/25/2022    Dr. Maravilla    INSERTION OF PERMANENT PACEMAKER  12/02/2022    Dr. Maravilla    INTRAMEDULLARY RODDING OF FEMUR      OPEN  REDUCTION AND INTERNAL FIXATION (ORIF) OF INJURY OF THUMB      REMOVAL OF IMPLANTABLE LOOP RECORDER  12/02/2022    Dr. Maravilla    SKIN TAG REMOVAL      THUMB SURGERY         Lab Results   Component Value Date    WBC 5.07 10/23/2023    HGB 16.7 03/12/2023    HCT 50.9 03/12/2023     03/12/2023       CMP  Sodium Level   Date Value Ref Range Status   10/23/2023 138 136 - 145 mmol/L Final     Potassium Level   Date Value Ref Range Status   10/23/2023 4.4 3.5 - 5.1 mmol/L Final     Carbon Dioxide   Date Value Ref Range Status   10/23/2023 23 22 - 29 mmol/L Final     Blood Urea Nitrogen   Date Value Ref Range Status   10/23/2023 8.8 8.4 - 25.7 mg/dL Final     Creatinine   Date Value Ref Range Status   10/23/2023 1.03 0.73 - 1.18 mg/dL Final     Calcium Level Total   Date Value Ref Range Status   10/23/2023 9.5 8.4 - 10.2 mg/dL Final     Albumin Level   Date Value Ref Range Status   10/23/2023 4.3 3.5 - 5.0 g/dL Final     Bilirubin Total   Date Value Ref Range Status   10/23/2023 0.6 <=1.5 mg/dL Final     Alkaline Phosphatase   Date Value Ref Range Status   10/23/2023 80 40 - 150 unit/L Final     Aspartate Aminotransferase   Date Value Ref Range Status   10/23/2023 15 5 - 34 unit/L Final     Alanine Aminotransferase   Date Value Ref Range Status   10/23/2023 19 0 - 55 unit/L Final     eGFR   Date Value Ref Range Status   10/23/2023 >60 mls/min/1.73/m2 Final       Lab Results   Component Value Date    PROTIME 13.3 10/23/2023    INR 1.0 10/23/2023       Lab Results   Component Value Date    PTT 32.8 03/12/2023     EKG 10/23/23    Cardiac Progress Note 12/1/23 CIS:      Echo:    CIED Form:    Current Outpatient Medications   Medication Instructions    ALPRAZolam (XANAX) 0.5 MG tablet TAKE ONE TABLET BY MOUTH TWICE A DAY AS NEEDED FOR ANXIETY    amLODIPine (NORVASC) 10 mg, Oral, Daily    atorvastatin (LIPITOR) 20 mg, Oral, Daily    BRIVIACT 100 mg, Oral, 2 times daily    carvediloL (COREG) 12.5 mg, Oral, 2 times daily     cholecalciferol (vitamin D3) 50,000 Units, Oral, Every 7 days    cloNIDine 0.3 mg/24 hr td ptwk (CATAPRES) 0.3 mg/24 hr 1 patch, Transdermal, Every 7 days    cyclobenzaprine (FLEXERIL) 10 mg, Oral, Nightly    diclofenac (VOLTAREN) 50 mg, Oral, 2 times daily PRN    EPINEPHrine (EPIPEN) 0.3 mg, Intramuscular, As needed (PRN)    EScitalopram oxalate (LEXAPRO) 10 mg, Oral, Daily, After breakfast    folic acid (FOLVITE) 1 mg, Oral, Daily, After food    hydroCHLOROthiazide (HYDRODIURIL) 12.5 mg, Oral, Daily    HYDROcodone-acetaminophen (NORCO)  mg per tablet 1 tablet, Oral, Every 8 hours PRN    hydroxychloroquine (PLAQUENIL) 200 mg, Oral, 2 times daily    lamoTRIgine (LAMICTAL) 200 mg, Oral, 2 times daily    levETIRAcetam (KEPPRA) 1,000 mg, Oral, 3 times daily    losartan (COZAAR) 50 mg, Oral, Daily    magnesium oxide (MAG-OX) 400 mg, Oral, Nightly    methotrexate 15 mg, Oral, Every 7 days, EVERY  MONDAY  TAKE 3 TAB AFTER BREAKFAST AND 3 TAB AFTER SUPPER    mupirocin (BACTROBAN) 2 % ointment Topical (Top), 3 times daily    pregabalin (LYRICA) 100 mg, Oral, 3 times daily    risperiDONE (RISPERDAL) 1 mg, Oral, 2 times daily    sertraline (ZOLOFT) 100 mg, Oral, Daily       Past anesthesia records: None noted        Pre-op Assessment    I have reviewed the Patient Summary Reports.     I have reviewed the Nursing Notes. I have reviewed the NPO Status.   I have reviewed the Medications.     Review of Systems  Anesthesia Hx:  No problems with previous Anesthesia   History of prior surgery of interest to airway management or planning:          Denies Family Hx of Anesthesia complications.    Denies Personal Hx of Anesthesia complications.                    Hematology/Oncology:  Hematology Normal   Oncology Normal                                   EENT/Dental:  EENT/Dental Normal           Cardiovascular:  Cardiovascular Normal                                            Pulmonary:  Pulmonary Normal                        Renal/:  Renal/ Normal                 Hepatic/GI:  Hepatic/GI Normal                 Musculoskeletal:  Musculoskeletal Normal                Neurological:  Neurology Normal                                      Endocrine:  Endocrine Normal            Dermatological:  Skin Normal    Psych:  Psychiatric Normal                    Physical Exam  General: Cooperative, Well nourished, Alert and Oriented    Airway:  Mallampati: III / II  Mouth Opening: Normal  TM Distance: Normal  Tongue: Normal  Neck ROM: Normal ROM    Dental:  Intact        Anesthesia Plan  Type of Anesthesia, risks & benefits discussed:    Anesthesia Type: Gen ETT  Intra-op Monitoring Plan: Standard ASA Monitors  Post Op Pain Control Plan: multimodal analgesia, peripheral nerve block and IV/PO Opioids PRN  Induction:  IV  Airway Plan: Direct  Informed Consent: Informed consent signed with the Patient and all parties understand the risks and agree with anesthesia plan.  All questions answered. Patient consented to blood products? No  ASA Score: 3  Day of Surgery Review of History & Physical: H&P Update referred to the surgeon/provider.    Ready For Surgery From Anesthesia Perspective.     .

## 2024-02-26 NOTE — H&P
Interval H&P    Patient seen and examined in preop holding area. No changes to history or exam other than noted below.    To OR today for right shoulder arthroscopy with rotator cuff repair, biceps tenotomy versus tenodesis     [facility]  Established Patient Office Visit  01/22/2024         Patient ID: Darius Restrepo  YOB: 1965  MRN: [id number]     Chief Complaint: Pain of the Right Shoulder (Patient is here today for follow up for  right shoulder.  Has been taking meds; pain: NSAID Which has as needed for pain, which does help.  )     Past Orthopaedic Surgeries:  Prior fixation of his finger, prior knee arthroscopy     HPI:  Darius Restrepo is a 59 y.o. male with history of pack per day smoking, knee osteoarthritis, stroke, AFib, myocardial infarction, pacemaker presenting with multiple month history of right shoulder pain and weakness.  He recalls a fall around 1 year ago in which she sustained several injuries including his right shoulder.  He has had pain and decreased function since that time.  He has poor active motion and strength with good passive motion.  He has failed physical therapy, injections, medication, and avoiding aggravating activities.  He was initially scheduled for surgery in November, but needed to obtain cardiac clearance prior to this.  This has been completed and he is here to schedule his surgery.      Past Medical History:          Past Medical History:   Diagnosis Date    A-fib      Bipolar affective disorder, mixed      CVA (cerebral vascular accident)      Depression      Epilepsy      GERD (gastroesophageal reflux disease)      Hyperlipidemia      Hypertension      KRISTIN on CPAP      Pacemaker      TIA (transient ischemic attack)              Past Surgical History:   Procedure Laterality Date    A-V CARDIAC PACEMAKER INSERTION N/A 12/02/2022     Procedure: INSERTION, CARDIAC PACEMAKER, DUAL CHAMBER;  Surgeon: Hemant Maravilla DO;  Location: [facility]  CATH LAB;  Service: Cardiology;  Laterality: N/A;  EXPLANT OF EVENT RECORDER /  IMPLANT OF DUAL CHAMBER PPM (SJM)    FEMUR IM MARITO REMOVAL        HIP SURGERY Left       Marito inserted from knee to hip    INSERTION OF IMPLANTABLE LOOP RECORDER   03/25/2022     Dr. Maarvilla    INSERTION OF PERMANENT PACEMAKER   12/02/2022     Dr. Maravilla    INTRAMEDULLARY RODDING OF FEMUR        OPEN REDUCTION AND INTERNAL FIXATION (ORIF) OF INJURY OF THUMB        REMOVAL OF IMPLANTABLE LOOP RECORDER   12/02/2022     Dr. Maravilla    SKIN TAG REMOVAL        THUMB SURGERY                Family History   Problem Relation Age of Onset    Hypertension Mother      Hypertension Sister      Diabetes Sister      Hypertension Sister      Hypertension Sister      Hypertension Sister      Hypertension Brother      Hypertension Brother      Hypertension Brother      Diabetes Paternal Grandmother        Social History               Socioeconomic History    Marital status:    Tobacco Use    Smoking status: Every Day       Current packs/day: 1.00       Average packs/day: 1 pack/day for 25.1 years (25.1 ttl pk-yrs)       Types: Cigarettes       Start date: 11/28/1998    Smokeless tobacco: Never    Tobacco comments:       Nicotine patches used    Substance and Sexual Activity    Alcohol use: Not Currently       Comment: 1-2 times weekly    Drug use: Not Currently    Sexual activity: Yes              Medication List with Changes/Refills   Current Medications     ALPRAZOLAM (XANAX) 0.5 MG TABLET    TAKE ONE TABLET BY MOUTH TWICE A DAY AS NEEDED FOR ANXIETY     AMLODIPINE (NORVASC) 10 MG TABLET    Take 10 mg by mouth once daily.     ATORVASTATIN (LIPITOR) 20 MG TABLET    Take 20 mg by mouth once daily.     BRIVIACT 100 MG TAB    Take 100 mg by mouth 2 (two) times daily.     CARVEDILOL (COREG) 12.5 MG TABLET    Take 12.5 mg by mouth 2 (two) times daily.     CHOLECALCIFEROL, VITAMIN D3, 1,250 MCG (50,000 UNIT) CAPSULE    Take 1 capsule (50,000 Units total)  by mouth every 7 days.     CLONIDINE 0.3 MG/24 HR TD PTWK (CATAPRES) 0.3 MG/24 HR    Place 1 patch onto the skin every 7 days.     CYCLOBENZAPRINE (FLEXERIL) 10 MG TABLET    Take 1 tablet (10 mg total) by mouth every evening.     DICLOFENAC (VOLTAREN) 50 MG EC TABLET    Take 1 tablet (50 mg total) by mouth 2 (two) times daily as needed (pain, after food).     EPINEPHRINE (EPIPEN) 0.3 MG/0.3 ML ATIN    Inject 0.3 mLs (0.3 mg total) into the muscle as needed (allergic reaction).     ESCITALOPRAM OXALATE (LEXAPRO) 20 MG TABLET    Take 0.5 tablets (10 mg total) by mouth once daily. After breakfast     FOLIC ACID (FOLVITE) 1 MG TABLET    Take 1 tablet (1 mg total) by mouth once daily. After food     HYDROCHLOROTHIAZIDE (HYDRODIURIL) 12.5 MG TAB    Take 12.5 mg by mouth once daily.     HYDROCODONE-ACETAMINOPHEN (NORCO)  MG PER TABLET    Take 1 tablet by mouth every 8 (eight) hours as needed.     HYDROXYCHLOROQUINE (PLAQUENIL) 200 MG TABLET    Take 1 tablet (200 mg total) by mouth 2 (two) times daily.     LAMOTRIGINE (LAMICTAL) 200 MG TABLET    Take 200 mg by mouth 2 (two) times daily.     LEVETIRACETAM (KEPPRA) 1000 MG TABLET    Take 1,000 mg by mouth 3 (three) times daily.     LOSARTAN (COZAAR) 50 MG TABLET    Take 50 mg by mouth once daily.     MAGNESIUM OXIDE (MAG-OX) 400 MG (241.3 MG MAGNESIUM) TABLET    Take 1 tablet (400 mg total) by mouth nightly.     METHOTREXATE 2.5 MG TAB    Take 6 tablets (15 mg total) by mouth every 7 days. EVERY  MONDAY  TAKE 3 TAB AFTER BREAKFAST AND 3 TAB AFTER SUPPER     MUPIROCIN (BACTROBAN) 2 % OINTMENT    Apply topically 3 (three) times daily.     PREGABALIN (LYRICA) 100 MG CAPSULE    Take 1 capsule (100 mg total) by mouth 3 (three) times daily.     RISPERIDONE (RISPERDAL) 1 MG TABLET    Take 1 mg by mouth 2 (two) times daily.     SERTRALINE (ZOLOFT) 100 MG TABLET    Take 100 mg by mouth once daily.           Review of patient's allergies indicates:   Allergen Reactions    Peanut  Anaphylaxis         Physical Exam:     Head: Normocephalic, without obvious abnormality, atraumatic  Eyes: conjunctivae/corneas clear. EOM's intact  Ears: normal external appearance  Nose: Nares normal. Septum midline. Mucosa normal. No drainage  Throat: normal findings: lips normal without lesions  Lungs: unlabored breathing on room air  Chest wall: symmetric chest rise  Heart: regular rate and rhythm  Pulses: 2+ and symmetric  Skin: Skin color, texture, turgor normal. No rashes or lesions  Neurologic: Grossly normal     Right shoulder   No tenderness to palpation of the AC joint   Tenderness to palpation at the biceps groove   Active range of motion limited to 90° of abduction, 100° of forward flexion, 40° of external rotation, internal rotation to the sacrum   Passive range of motion 130° of abduction, 160° of forward flexion 50° of external rotation  Mckinley positive, Ruthy's positive  Speed's positive    Significant weakness with empty can shoulder abduction forward flexion and external rotation  Motor and sensory intact throughout the right upper extremity     Imaging:  X-rays of the right shoulder show well-preserved joint space no significant osteophytes some mild superior migration humeral head     MRI shows full-thickness tear of the supraspinatus with significant retraction, full-thickness tear of the infraspinatus some tendinosis of the subscapularis with tearing and biceps tendinosis     Assessment and Plan:     Darius Restrepo is a 59 y.o. male with complicated medical history with right shoulder rotator cuff tear and biceps pathology.  He has failed conservative management.  He was initially scheduled for surgery this fall, but required further cardiac workup.  He saw Cardiology on 12/01/2023 and no further workup from a cardiac standpoint was required. We discussed operative and nonoperative options. The patient had an opportunity to ask questions. All questions were answered. The risks and benefits of  surgery were discussed with the patient, including but not limited to bleeding, infection, damage to surrounding nerves and structures, need for further surgery, repair failure, anesthesia risk, progression of arthritis, blood clots, and medical risks of surgery. The patient voiced understanding of the risks and benefits and provided written consent to the procedure. Plan for right shoulder arthroscopic rotator cuff repair, biceps tenotomy versus tenodesis, and any indicated procedures. Patient will be scheduled for surgery at a mutually convenient date in the near future - February 27.  Surgery and anticipated recovery course were discussed.  All his questions were answered.  He was happy and in agreement with the plan.

## 2024-02-27 ENCOUNTER — HOSPITAL ENCOUNTER (OUTPATIENT)
Facility: HOSPITAL | Age: 59
Discharge: HOME | End: 2024-02-27
Attending: ORTHOPAEDIC SURGERY | Admitting: ORTHOPAEDIC SURGERY
Payer: MEDICAID

## 2024-02-27 ENCOUNTER — ANESTHESIA (OUTPATIENT)
Dept: SURGERY | Facility: HOSPITAL | Age: 59
End: 2024-02-27
Payer: MEDICAID

## 2024-02-27 VITALS
DIASTOLIC BLOOD PRESSURE: 93 MMHG | SYSTOLIC BLOOD PRESSURE: 162 MMHG | OXYGEN SATURATION: 96 % | WEIGHT: 236.38 LBS | HEART RATE: 81 BPM | BODY MASS INDEX: 38.16 KG/M2 | RESPIRATION RATE: 20 BRPM | TEMPERATURE: 97 F

## 2024-02-27 DIAGNOSIS — M75.101 TEAR OF RIGHT ROTATOR CUFF, UNSPECIFIED TEAR EXTENT, UNSPECIFIED WHETHER TRAUMATIC: Primary | ICD-10-CM

## 2024-02-27 DIAGNOSIS — M67.911 ROTATOR CUFF DYSFUNCTION, RIGHT: ICD-10-CM

## 2024-02-27 PROCEDURE — 25000003 PHARM REV CODE 250: Performed by: NURSE ANESTHETIST, CERTIFIED REGISTERED

## 2024-02-27 PROCEDURE — 63600175 PHARM REV CODE 636 W HCPCS: Performed by: STUDENT IN AN ORGANIZED HEALTH CARE EDUCATION/TRAINING PROGRAM

## 2024-02-27 PROCEDURE — 63600175 PHARM REV CODE 636 W HCPCS: Performed by: ORTHOPAEDIC SURGERY

## 2024-02-27 PROCEDURE — D9220A PRA ANESTHESIA: Mod: CRNA,,, | Performed by: NURSE ANESTHETIST, CERTIFIED REGISTERED

## 2024-02-27 PROCEDURE — 64415 NJX AA&/STRD BRCH PLXS IMG: CPT | Mod: 59,RT,, | Performed by: SPECIALIST

## 2024-02-27 PROCEDURE — 36000710: Performed by: ORTHOPAEDIC SURGERY

## 2024-02-27 PROCEDURE — 36000711: Performed by: ORTHOPAEDIC SURGERY

## 2024-02-27 PROCEDURE — C1889 IMPLANT/INSERT DEVICE, NOC: HCPCS | Performed by: ORTHOPAEDIC SURGERY

## 2024-02-27 PROCEDURE — D9220A PRA ANESTHESIA: Mod: ANES,,, | Performed by: SPECIALIST

## 2024-02-27 PROCEDURE — 37000008 HC ANESTHESIA 1ST 15 MINUTES: Performed by: ORTHOPAEDIC SURGERY

## 2024-02-27 PROCEDURE — 71000033 HC RECOVERY, INTIAL HOUR: Performed by: ORTHOPAEDIC SURGERY

## 2024-02-27 PROCEDURE — C1713 ANCHOR/SCREW BN/BN,TIS/BN: HCPCS | Performed by: ORTHOPAEDIC SURGERY

## 2024-02-27 PROCEDURE — 71000016 HC POSTOP RECOV ADDL HR: Performed by: ORTHOPAEDIC SURGERY

## 2024-02-27 PROCEDURE — 63600175 PHARM REV CODE 636 W HCPCS: Performed by: NURSE ANESTHETIST, CERTIFIED REGISTERED

## 2024-02-27 PROCEDURE — 37000009 HC ANESTHESIA EA ADD 15 MINS: Performed by: ORTHOPAEDIC SURGERY

## 2024-02-27 PROCEDURE — 63600175 PHARM REV CODE 636 W HCPCS: Performed by: SPECIALIST

## 2024-02-27 PROCEDURE — 25000003 PHARM REV CODE 250: Performed by: SPECIALIST

## 2024-02-27 PROCEDURE — 71000015 HC POSTOP RECOV 1ST HR: Performed by: ORTHOPAEDIC SURGERY

## 2024-02-27 PROCEDURE — 27201423 OPTIME MED/SURG SUP & DEVICES STERILE SUPPLY: Performed by: ORTHOPAEDIC SURGERY

## 2024-02-27 PROCEDURE — 23412 REPAIR ROTATOR CUFF CHRONIC: CPT | Mod: RT,,, | Performed by: ORTHOPAEDIC SURGERY

## 2024-02-27 PROCEDURE — 64415 NJX AA&/STRD BRCH PLXS IMG: CPT | Performed by: SPECIALIST

## 2024-02-27 RX ORDER — DEXAMETHASONE SODIUM PHOSPHATE 4 MG/ML
INJECTION, SOLUTION INTRA-ARTICULAR; INTRALESIONAL; INTRAMUSCULAR; INTRAVENOUS; SOFT TISSUE
Status: DISCONTINUED | OUTPATIENT
Start: 2024-02-27 | End: 2024-02-27

## 2024-02-27 RX ORDER — CEFAZOLIN SODIUM 1 G/3ML
2 INJECTION, POWDER, FOR SOLUTION INTRAMUSCULAR; INTRAVENOUS
Status: COMPLETED | OUTPATIENT
Start: 2024-02-27 | End: 2024-02-27

## 2024-02-27 RX ORDER — LIDOCAINE HYDROCHLORIDE 20 MG/ML
INJECTION INTRAVENOUS
Status: DISCONTINUED | OUTPATIENT
Start: 2024-02-27 | End: 2024-02-27

## 2024-02-27 RX ORDER — HYDROCODONE BITARTRATE AND ACETAMINOPHEN 10; 325 MG/1; MG/1
1 TABLET ORAL EVERY 6 HOURS PRN
Qty: 28 TABLET | Refills: 0 | Status: SHIPPED | OUTPATIENT
Start: 2024-02-27 | End: 2024-04-17

## 2024-02-27 RX ORDER — PROCHLORPERAZINE EDISYLATE 5 MG/ML
5 INJECTION INTRAMUSCULAR; INTRAVENOUS ONCE AS NEEDED
Status: DISCONTINUED | OUTPATIENT
Start: 2024-02-27 | End: 2024-02-27 | Stop reason: HOSPADM

## 2024-02-27 RX ORDER — MIDAZOLAM HYDROCHLORIDE 5 MG/ML
5 INJECTION INTRAMUSCULAR; INTRAVENOUS ONCE
Status: COMPLETED | OUTPATIENT
Start: 2024-02-27 | End: 2024-02-27

## 2024-02-27 RX ORDER — HYDROMORPHONE HYDROCHLORIDE 1 MG/ML
0.2 INJECTION, SOLUTION INTRAMUSCULAR; INTRAVENOUS; SUBCUTANEOUS EVERY 5 MIN PRN
Status: DISCONTINUED | OUTPATIENT
Start: 2024-02-27 | End: 2024-02-27 | Stop reason: HOSPADM

## 2024-02-27 RX ORDER — ROPIVACAINE HYDROCHLORIDE 5 MG/ML
INJECTION, SOLUTION EPIDURAL; INFILTRATION; PERINEURAL
Status: DISCONTINUED | OUTPATIENT
Start: 2024-02-27 | End: 2024-02-27

## 2024-02-27 RX ORDER — MEPERIDINE HYDROCHLORIDE 25 MG/ML
12.5 INJECTION INTRAMUSCULAR; INTRAVENOUS; SUBCUTANEOUS ONCE
Status: DISCONTINUED | OUTPATIENT
Start: 2024-02-27 | End: 2024-02-27 | Stop reason: HOSPADM

## 2024-02-27 RX ORDER — ONDANSETRON HYDROCHLORIDE 2 MG/ML
INJECTION, SOLUTION INTRAVENOUS
Status: DISCONTINUED | OUTPATIENT
Start: 2024-02-27 | End: 2024-02-27

## 2024-02-27 RX ORDER — ONDANSETRON HYDROCHLORIDE 2 MG/ML
4 INJECTION, SOLUTION INTRAVENOUS ONCE
Status: DISCONTINUED | OUTPATIENT
Start: 2024-02-27 | End: 2024-02-27 | Stop reason: HOSPADM

## 2024-02-27 RX ORDER — PROPOFOL 10 MG/ML
VIAL (ML) INTRAVENOUS
Status: DISCONTINUED | OUTPATIENT
Start: 2024-02-27 | End: 2024-02-27

## 2024-02-27 RX ORDER — IPRATROPIUM BROMIDE AND ALBUTEROL SULFATE 2.5; .5 MG/3ML; MG/3ML
3 SOLUTION RESPIRATORY (INHALATION) ONCE AS NEEDED
Status: DISCONTINUED | OUTPATIENT
Start: 2024-02-27 | End: 2024-02-27 | Stop reason: HOSPADM

## 2024-02-27 RX ORDER — DEXMEDETOMIDINE HYDROCHLORIDE 100 UG/ML
INJECTION, SOLUTION INTRAVENOUS
Status: DISCONTINUED | OUTPATIENT
Start: 2024-02-27 | End: 2024-02-27

## 2024-02-27 RX ORDER — DIPHENHYDRAMINE HYDROCHLORIDE 50 MG/ML
25 INJECTION INTRAMUSCULAR; INTRAVENOUS ONCE AS NEEDED
Status: DISCONTINUED | OUTPATIENT
Start: 2024-02-27 | End: 2024-02-27 | Stop reason: HOSPADM

## 2024-02-27 RX ORDER — GABAPENTIN 600 MG/1
600 TABLET ORAL 3 TIMES DAILY
Qty: 90 TABLET | Refills: 11 | Status: SHIPPED | OUTPATIENT
Start: 2024-02-27 | End: 2024-04-17

## 2024-02-27 RX ORDER — METHOCARBAMOL 750 MG/1
750 TABLET, FILM COATED ORAL 4 TIMES DAILY
Qty: 40 TABLET | Refills: 0 | Status: SHIPPED | OUTPATIENT
Start: 2024-02-27 | End: 2024-03-08

## 2024-02-27 RX ORDER — OXYCODONE AND ACETAMINOPHEN 5; 325 MG/1; MG/1
2 TABLET ORAL ONCE
Status: DISCONTINUED | OUTPATIENT
Start: 2024-02-27 | End: 2024-02-27

## 2024-02-27 RX ORDER — MUPIROCIN 20 MG/G
OINTMENT TOPICAL
Status: DISCONTINUED | OUTPATIENT
Start: 2024-02-27 | End: 2024-02-27 | Stop reason: HOSPADM

## 2024-02-27 RX ORDER — ROCURONIUM BROMIDE 10 MG/ML
INJECTION, SOLUTION INTRAVENOUS
Status: DISCONTINUED | OUTPATIENT
Start: 2024-02-27 | End: 2024-02-27

## 2024-02-27 RX ORDER — HYDROMORPHONE HYDROCHLORIDE 1 MG/ML
0.5 INJECTION, SOLUTION INTRAMUSCULAR; INTRAVENOUS; SUBCUTANEOUS EVERY 5 MIN PRN
Status: DISCONTINUED | OUTPATIENT
Start: 2024-02-27 | End: 2024-02-27 | Stop reason: HOSPADM

## 2024-02-27 RX ORDER — SODIUM CHLORIDE 9 MG/ML
INJECTION, SOLUTION INTRAVENOUS CONTINUOUS
Status: DISCONTINUED | OUTPATIENT
Start: 2024-02-27 | End: 2024-02-27 | Stop reason: HOSPADM

## 2024-02-27 RX ORDER — HYDROMORPHONE HYDROCHLORIDE 1 MG/ML
0.5 INJECTION, SOLUTION INTRAMUSCULAR; INTRAVENOUS; SUBCUTANEOUS EVERY 5 MIN PRN
Status: DISCONTINUED | OUTPATIENT
Start: 2024-02-27 | End: 2024-02-27

## 2024-02-27 RX ORDER — SODIUM CHLORIDE, SODIUM LACTATE, POTASSIUM CHLORIDE, CALCIUM CHLORIDE 600; 310; 30; 20 MG/100ML; MG/100ML; MG/100ML; MG/100ML
INJECTION, SOLUTION INTRAVENOUS CONTINUOUS
Status: DISCONTINUED | OUTPATIENT
Start: 2024-02-27 | End: 2024-02-27 | Stop reason: HOSPADM

## 2024-02-27 RX ORDER — KETOROLAC TROMETHAMINE 30 MG/ML
INJECTION, SOLUTION INTRAMUSCULAR; INTRAVENOUS
Status: DISCONTINUED | OUTPATIENT
Start: 2024-02-27 | End: 2024-02-27

## 2024-02-27 RX ORDER — OXYCODONE AND ACETAMINOPHEN 5; 325 MG/1; MG/1
2 TABLET ORAL ONCE
Status: COMPLETED | OUTPATIENT
Start: 2024-02-27 | End: 2024-02-27

## 2024-02-27 RX ORDER — HYDROMORPHONE HYDROCHLORIDE 1 MG/ML
0.2 INJECTION, SOLUTION INTRAMUSCULAR; INTRAVENOUS; SUBCUTANEOUS EVERY 5 MIN PRN
Status: DISCONTINUED | OUTPATIENT
Start: 2024-02-27 | End: 2024-02-27

## 2024-02-27 RX ORDER — EPINEPHRINE 1 MG/ML
INJECTION INTRAMUSCULAR; INTRAVENOUS; SUBCUTANEOUS
Status: DISCONTINUED | OUTPATIENT
Start: 2024-02-27 | End: 2024-02-27 | Stop reason: HOSPADM

## 2024-02-27 RX ORDER — MEPERIDINE HYDROCHLORIDE 25 MG/ML
12.5 INJECTION INTRAMUSCULAR; INTRAVENOUS; SUBCUTANEOUS ONCE
Status: DISCONTINUED | OUTPATIENT
Start: 2024-02-27 | End: 2024-02-27

## 2024-02-27 RX ORDER — FENTANYL CITRATE 50 UG/ML
INJECTION, SOLUTION INTRAMUSCULAR; INTRAVENOUS
Status: DISCONTINUED | OUTPATIENT
Start: 2024-02-27 | End: 2024-02-27

## 2024-02-27 RX ORDER — PHENYLEPHRINE HYDROCHLORIDE 10 MG/ML
INJECTION INTRAVENOUS
Status: DISCONTINUED | OUTPATIENT
Start: 2024-02-27 | End: 2024-02-27

## 2024-02-27 RX ORDER — MELOXICAM 15 MG/1
15 TABLET ORAL DAILY
Qty: 30 TABLET | Refills: 0 | Status: SHIPPED | OUTPATIENT
Start: 2024-02-27

## 2024-02-27 RX ORDER — PROCHLORPERAZINE EDISYLATE 5 MG/ML
5 INJECTION INTRAMUSCULAR; INTRAVENOUS ONCE AS NEEDED
Status: DISCONTINUED | OUTPATIENT
Start: 2024-02-27 | End: 2024-02-27

## 2024-02-27 RX ADMIN — SUGAMMADEX 200 MG: 100 INJECTION, SOLUTION INTRAVENOUS at 09:02

## 2024-02-27 RX ADMIN — CEFAZOLIN 2 G: 330 INJECTION, POWDER, FOR SOLUTION INTRAMUSCULAR; INTRAVENOUS at 07:02

## 2024-02-27 RX ADMIN — DEXMEDETOMIDINE 10 MCG: 200 INJECTION, SOLUTION INTRAVENOUS at 08:02

## 2024-02-27 RX ADMIN — ROCURONIUM BROMIDE 20 MG: 10 INJECTION INTRAVENOUS at 08:02

## 2024-02-27 RX ADMIN — FENTANYL CITRATE 50 MCG: 50 INJECTION INTRAMUSCULAR; INTRAVENOUS at 07:02

## 2024-02-27 RX ADMIN — Medication 150 MG: at 07:02

## 2024-02-27 RX ADMIN — DEXMEDETOMIDINE 10 MCG: 200 INJECTION, SOLUTION INTRAVENOUS at 09:02

## 2024-02-27 RX ADMIN — ROCURONIUM BROMIDE 50 MG: 10 INJECTION INTRAVENOUS at 07:02

## 2024-02-27 RX ADMIN — ROPIVACAINE HYDROCHLORIDE 10 ML: 5 INJECTION, SOLUTION EPIDURAL; INFILTRATION; PERINEURAL at 06:02

## 2024-02-27 RX ADMIN — PHENYLEPHRINE HYDROCHLORIDE 200 MCG: 10 INJECTION INTRAVENOUS at 07:02

## 2024-02-27 RX ADMIN — KETOROLAC TROMETHAMINE 30 MG: 30 INJECTION, SOLUTION INTRAMUSCULAR at 08:02

## 2024-02-27 RX ADMIN — Medication 50 MG: at 07:02

## 2024-02-27 RX ADMIN — FENTANYL CITRATE 50 MCG: 50 INJECTION INTRAMUSCULAR; INTRAVENOUS at 08:02

## 2024-02-27 RX ADMIN — OXYCODONE HYDROCHLORIDE AND ACETAMINOPHEN 2 TABLET: 5; 325 TABLET ORAL at 09:02

## 2024-02-27 RX ADMIN — SODIUM CHLORIDE, POTASSIUM CHLORIDE, SODIUM LACTATE AND CALCIUM CHLORIDE: 600; 310; 30; 20 INJECTION, SOLUTION INTRAVENOUS at 06:02

## 2024-02-27 RX ADMIN — MIDAZOLAM 5 MG: 5 INJECTION INTRAMUSCULAR; INTRAVENOUS at 06:02

## 2024-02-27 RX ADMIN — DEXAMETHASONE SODIUM PHOSPHATE 8 MG: 4 INJECTION, SOLUTION INTRA-ARTICULAR; INTRALESIONAL; INTRAMUSCULAR; INTRAVENOUS; SOFT TISSUE at 08:02

## 2024-02-27 RX ADMIN — ONDANSETRON 4 MG: 2 INJECTION INTRAMUSCULAR; INTRAVENOUS at 08:02

## 2024-02-27 RX ADMIN — PHENYLEPHRINE HYDROCHLORIDE 100 MCG: 10 INJECTION INTRAVENOUS at 07:02

## 2024-02-27 RX ADMIN — LIDOCAINE HYDROCHLORIDE 50 MG: 20 INJECTION INTRAVENOUS at 07:02

## 2024-02-27 RX ADMIN — SODIUM CHLORIDE, POTASSIUM CHLORIDE, SODIUM LACTATE AND CALCIUM CHLORIDE: 600; 310; 30; 20 INJECTION, SOLUTION INTRAVENOUS at 07:02

## 2024-02-27 NOTE — DISCHARGE SUMMARY
[facility]  Discharge Note  Short Stay    Procedure(s) (LRB):  REPAIR, ROTATOR CUFF (Right)      OUTCOME: Patient tolerated treatment/procedure well without complication and is now ready for discharge.    DISPOSITION: Home or Self Care    FINAL DIAGNOSIS:  Right massive rotator cuff tear, biceps tendonitis    FOLLOWUP: In clinic    DISCHARGE INSTRUCTIONS:    Discharge Procedure Orders   Keep surgical extremity elevated     Ice to affected area     Notify your health care provider if you experience any of the following:  temperature >100.4     Notify your health care provider if you experience any of the following:  persistent nausea and vomiting or diarrhea     Notify your health care provider if you experience any of the following:  redness, tenderness, or signs of infection (pain, swelling, redness, odor or green/yellow discharge around incision site)     Notify your health care provider if you experience any of the following:  difficulty breathing or increased cough     Leave dressing on - Keep it clean, dry, and intact until clinic visit     Activity as tolerated     Weight bearing restrictions (specify):   Order Comments: YOUNG KNOX, maintain sling         Clinical Reference Documents Added to Patient Instructions         Document    ROTATOR CUFF REPAIR DISCHARGE INSTRUCTIONS (ENGLISH)            TIME SPENT ON DISCHARGE: 30 minutes

## 2024-02-27 NOTE — ANESTHESIA PROCEDURE NOTES
Peripheral Block    Patient location during procedure: pre-op   Block not for primary anesthetic.  Reason for block: at surgeon's request and post-op pain management   Post-op Pain Location: Right upper extremity   Start time: 2/27/2024 6:56 AM  Timeout: 2/27/2024 6:55 AM   End time: 2/27/2024 7:00 AM    Staffing  Authorizing Provider: Cary Parker MD  Performing Provider: Cary Parker MD    Staffing  Performed by: Cary Parker MD  Authorized by: Cary Parker MD    Preanesthetic Checklist  Completed: patient identified, IV checked, site marked, risks and benefits discussed, surgical consent, monitors and equipment checked, pre-op evaluation and timeout performed  Peripheral Block  Patient position: sitting  Prep: ChloraPrep  Patient monitoring: heart rate, cardiac monitor, continuous pulse ox, continuous capnometry and frequent blood pressure checks  Block type: interscalene  Laterality: right  Injection technique: single shot  Needle  Needle type: Stimuplex   Needle gauge: 22 G  Needle length: 2 in  Needle localization: anatomical landmarks and ultrasound guidance   -ultrasound image captured on disc.  Assessment  Injection assessment: negative aspiration, negative parasthesia and local visualized surrounding nerve  Paresthesia pain: none  Heart rate change: no  Slow fractionated injection: yes    Medications:    Medications: ropivacaine (NAROPIN) injection 0.5% - Perineural   10 mL - 2/27/2024 6:55:00 AM    Additional Notes  VSS.  [facility] RN monitoring vitals throughout procedure.  Patient tolerated procedure well.

## 2024-02-27 NOTE — ANESTHESIA POSTPROCEDURE EVALUATION
Anesthesia Post Evaluation    Patient: Darius Restrepo    Procedure(s) Performed: Procedure(s) (LRB):  REPAIR, ROTATOR CUFF (Right)    Final Anesthesia Type: general      Patient location during evaluation: PACU  Patient participation: Yes- Able to Participate  Level of consciousness: awake and responds to stimulation  Post-procedure vital signs: reviewed and stable  Pain management: adequate  Airway patency: patent    PONV status at discharge: No PONV  Anesthetic complications: no      Cardiovascular status: blood pressure returned to baseline  Respiratory status: unassisted  Hydration status: euvolemic  Follow-up not needed.              Vitals Value Taken Time   /93 02/27/24 1132   Temp 36.2 °C (97.2 °F) 02/27/24 0950   Pulse 81 02/27/24 1142   Resp 20 02/27/24 0958   SpO2 95 % 02/27/24 1142   Vitals shown include unvalidated device data.      Event Time   Out of Recovery 09:48:00         Pain/Shantelle Score: Pain Rating Prior to Med Admin: 10 (2/27/2024  9:58 AM)  Shantelle Score: 10 (2/27/2024  9:50 AM)

## 2024-02-27 NOTE — BRIEF OP NOTE
[facility]  Brief Operative Note    Surgery Date: 2/27/2024     Surgeon(s) and Role:     * Jeremy Pennington MD - Primary    Assisting Surgeon: None    Pre-op Diagnosis:  * No pre-op diagnosis entered *    Post-op Diagnosis:  Post-Op Diagnosis Codes:     * Tear of right rotator cuff, unspecified tear extent, unspecified whether traumatic [M75.101]    Procedure(s) (LRB):  REPAIR, ROTATOR CUFF (Right)    Anesthesia: General    Operative Findings: massive retracted rotator cuff tear     Estimated Blood Loss: * No values recorded between 2/27/2024  7:50 AM and 2/27/2024  9:20 AM *         Specimens:   Specimen (24h ago, onward)      None              Discharge Note    OUTCOME: Patient tolerated treatment/procedure well without complication and is now ready for discharge.    DISPOSITION: Home or Self Care    FINAL DIAGNOSIS:  <principal problem not specified>    FOLLOWUP: In clinic    DISCHARGE INSTRUCTIONS:    Discharge Procedure Orders   Keep surgical extremity elevated     Ice to affected area     Notify your health care provider if you experience any of the following:  temperature >100.4     Notify your health care provider if you experience any of the following:  persistent nausea and vomiting or diarrhea     Notify your health care provider if you experience any of the following:  redness, tenderness, or signs of infection (pain, swelling, redness, odor or green/yellow discharge around incision site)     Notify your health care provider if you experience any of the following:  difficulty breathing or increased cough     Leave dressing on - Keep it clean, dry, and intact until clinic visit     Activity as tolerated     Weight bearing restrictions (specify):   Order Comments: NWB RUE, maintain sling        Clinical Reference Documents Added to Patient Instructions         Document    ROTATOR CUFF REPAIR DISCHARGE INSTRUCTIONS (ENGLISH)            Russell Clifton MD  [facility]

## 2024-02-27 NOTE — TRANSFER OF CARE
Anesthesia Transfer of Care Note    Patient: Darius Restrepo    Procedure(s) Performed: Procedure(s) (LRB):  REPAIR, ROTATOR CUFF (Right)    Patient location: PACU    Anesthesia Type: general    Transport from OR: Transported from OR on room air with adequate spontaneous ventilation    Post pain: adequate analgesia    Post assessment: no apparent anesthetic complications    Post vital signs: stable    Level of consciousness: sedated    Nausea/Vomiting: no nausea/vomiting    Complications: none    Transfer of care protocol was followed      Last vitals:

## 2024-02-27 NOTE — OP NOTE
DATE OF SURGERY:    2/27/2024      SURGEON:  Jeremy Pennington MD    ASSISTANT: Dr. Clifton  HOSPITAL:  [facility]     SERVICE:  Orthopedics      PREOPERATIVE DIAGNOSIS:  1. Right shoulder full-thickness retracted rotator cuff tear, labral tear bicipital tendinitis with subluxation subacromial impingement      POSTOPERATIVE DIAGNOSIS:  Same as above.      PROCEDURE:  1. Right shoulder arthroscopy with debridement of anterior labral tear and loose body removal 2.  Right shoulder arthroscopic biceps tenotomy 3. Right shoulder mini open full-thickness retracted rotator cuff repair, massive 4. Right shoulder mini open subacromial decompression      ANESTHESIA:  LMA.      IV FLUIDS:  See Anesthesia.      ESTIMATED BLOOD LOSS:  Less than 25 cc.      COUNTS:  Correct.      COMPLICATIONS:  None.      IMPLANTS:    Implant Name Type Inv. Item Serial No.  Lot No. LRB No. Used Action   ventix link knotless anchor 4.75mm peek anchor Stanton    [id number] Right 3 Implanted   ventix link knotless anchor 5.5mm     [id number] Right 1 Implanted         DISPOSITION:  Stable to PACU.      INDICATIONS FOR PROCEDURE: Darius Restrepo is a 59 y.o. old male  with continued pain and loss of motion of the right shoulder.  The patient has failed multiple conservative treatments. Risks, benefits, and alternatives discussed with the patient as well as patient's family in detail.  All questions were answered.  Informed consent was obtained.      PROCEDURE IN DETAIL: The patient was found in preoperative holding by Anesthesia and found fit for surgery.  Patient was taken to the operating room and placed on the operating table in supine position.  All bony prominences were well padded.  Time-out was called to identify correct patient, correct procedure, and correct site, and all were in agreement.  The patient underwent LMA anesthesia without complications.  The patient was then prepped and draped in normal sterile fashion, leaving the right upper  extremity exposed for surgery.  The patient was in the modified beach chair position.  Preoperative antibiotics wer given. Next, through the posterior portal with good backflow, a 1 cm incision was made.  A camera was introduced into the glenohumeral joint.  After this was done, the anterior portal was then made through an incision just lateral to the tip of the coracoid.  Next, examination of the glenohumeral joint demonstrated degenerative changes both on the glenoid and humeral head side.  There was a lot of loose bodies which was removed with a 3.5 shaver.  He had degenerative tearing log-rolling about the labrum which was debrided to stable labrum.  Inferior recess was inspected, loose bodies were suctioned and removed with a shaver.  Patient has subluxation and tearing of the biceps tendon, given this patient when a arthroscopic biceps tenotomy.  Attention was turned more laterally where patient was noted to have a full-thickness retracted rotator cuff tear, it was massive.  Given this a mini open incision was made over the anterolateral aspect of the right shoulder, 3 cm.  Soft tissue dissection was carefully taken down to the deltoid were split in line with its fibers.  Care was taken not go past 3 cm past the tip of the acromion.  Next the subacromial space was entered a large amount of bursal tissue was removed, he did have scraping of the undersurface of the acromion.  After the subacromial decompression the rotator cuff was identified in the back part of his shoulder, with use of Allis clamp and Key elevator it was brought back over the humeral head as best as possible.  Next 3 anchors were then used to repair of the rotator cuff over the humeral head.  After this was done, hemostasis was achieved.  Copious irrigation was used to wash the wound.  The fascia was closed with 0 Vicryl, subcutaneous tissues closed with 2-0 Vicryl, skin was closed with 3-0 nylon sutures.  Mastisol, Steri-Strips, Xeroform, 4 x  4's, soft tissue dressing, and a shoulder abduction sling were placed on the affected upper extremity.  The patient was awoken by Anesthesia and brought to PACU in stable condition.    ______________________________  Jeremy Pennington MD

## 2024-02-27 NOTE — ANESTHESIA PROCEDURE NOTES
Intubation    Date/Time: 2/27/2024 7:16 AM    Performed by: Andra Schulte CRNA  Authorized by: Andra Schulte CRNA    Intubation:     Induction:  Intravenous    Intubated:  Postinduction    Mask Ventilation:  Moderately difficult with oral airway    Attempts:  1    Attempted By:  CRNA    Method of Intubation:  Direct    Blade:  Villagran 2    Laryngeal View Grade: Grade IIA - cords partially seen      Difficult Airway Encountered?: No      Complications:  None    Airway Device:  Oral endotracheal tube    Airway Device Size:  7.5    Style/Cuff Inflation:  Cuffed (inflated to minimal occlusive pressure)    Inflation Amount (mL):  7    Tube secured:  22    Secured at:  The lips    Placement Verified By:  Capnometry    Complicating Factors:  Obesity and oropharyngeal edema or fat    Findings Post-Intubation:  BS equal bilateral and atraumatic/condition of teeth unchanged

## 2024-02-27 NOTE — DISCHARGE INSTRUCTIONS
· Keep follow up appointment at [facility].    · Take pain medication as prescribed.    · Keep arm elevated on pillow.    · No driving or consuming alcohol for the next 24 hours or while taking narcotic pain medicine.    · May apply ice pack to surgical area for 20 minutes at a time 6-8 times per day.    · Dressing: Leave clean and dry until follow up appointment.    · NO LIFTING OR PULLING WITH AFFECTED ARM until cleared by MD. Keep sling on. Able to release straps to bathe under arm but then reapply straps.    · Notify MD of any moderate to severe pain unrelieved by pain medicine or for any signs of infection including fever above 100.4, excessive redness or swelling, yellow/green foul- smelling drainage, nausea or vomiting. Call clinic at: [phone]. After business hours, if you are unable to reach a doctor on call at [phone] or your concern is an emergency, call 911 or report to your nearest emergency room.    ·  Thanks for choosing [facility]! Have a smooth recovery!

## 2024-02-28 ENCOUNTER — TELEPHONE (OUTPATIENT)
Dept: ORTHOPEDICS | Facility: CLINIC | Age: 59
End: 2024-02-28
Payer: MEDICARE

## 2024-02-28 NOTE — TELEPHONE ENCOUNTER
Spoke with patient's wife Per Dr. Chandra. Advised that Per Dr Chandra if patient is not having any pain he should be fine, but he does want him to go to the ED to have an Xray taken. I advised that per Dr. Chandra if he is ok and not having any pain he should be good to follow up with us in 2 weeks as planned, I advised that if he does start having pain or other issues to reach out to us and let us know. Patient wife verbalized understanding.

## 2024-02-28 NOTE — TELEPHONE ENCOUNTER
----- Message from Daisy Waters sent at 2/28/2024 11:40 AM CST -----  Regarding: post op problem  [facility] called to let us know when they called this morning to check in on patient after sx yesterday, patients wife answered the phone and advised her that Mr Restrepo caught a seizure this am and fell onto his arm.  Patients wife states he doesn't remember having the seizure and says he is not in any pain from the fall and is not wanting to come to the hospital.      Please advise what y'all want to do moving forward...

## 2024-03-13 ENCOUNTER — OFFICE VISIT (OUTPATIENT)
Dept: ORTHOPEDICS | Facility: CLINIC | Age: 59
End: 2024-03-13
Payer: MEDICAID

## 2024-03-13 ENCOUNTER — HOSPITAL ENCOUNTER (OUTPATIENT)
Dept: RADIOLOGY | Facility: HOSPITAL | Age: 59
Discharge: HOME | End: 2024-03-13
Attending: STUDENT IN AN ORGANIZED HEALTH CARE EDUCATION/TRAINING PROGRAM
Payer: MEDICARE

## 2024-03-13 VITALS
RESPIRATION RATE: 20 BRPM | SYSTOLIC BLOOD PRESSURE: 154 MMHG | DIASTOLIC BLOOD PRESSURE: 91 MMHG | OXYGEN SATURATION: 100 % | HEART RATE: 63 BPM | HEIGHT: 66 IN | BODY MASS INDEX: 37.41 KG/M2 | WEIGHT: 232.81 LBS | TEMPERATURE: 98 F

## 2024-03-13 DIAGNOSIS — M75.101 TEAR OF RIGHT ROTATOR CUFF, UNSPECIFIED TEAR EXTENT, UNSPECIFIED WHETHER TRAUMATIC: Primary | ICD-10-CM

## 2024-03-13 DIAGNOSIS — M75.101 TEAR OF RIGHT ROTATOR CUFF, UNSPECIFIED TEAR EXTENT, UNSPECIFIED WHETHER TRAUMATIC: ICD-10-CM

## 2024-03-13 PROCEDURE — 99215 OFFICE O/P EST HI 40 MIN: CPT | Mod: PBBFAC,25

## 2024-03-13 PROCEDURE — 73030 X-RAY EXAM OF SHOULDER: CPT | Mod: TC,RT

## 2024-03-13 PROCEDURE — 99024 POSTOP FOLLOW-UP VISIT: CPT | Mod: ,,, | Performed by: STUDENT IN AN ORGANIZED HEALTH CARE EDUCATION/TRAINING PROGRAM

## 2024-03-13 PROCEDURE — 3077F SYST BP >= 140 MM HG: CPT | Mod: CPTII,,, | Performed by: STUDENT IN AN ORGANIZED HEALTH CARE EDUCATION/TRAINING PROGRAM

## 2024-03-13 PROCEDURE — 3080F DIAST BP >= 90 MM HG: CPT | Mod: CPTII,,, | Performed by: STUDENT IN AN ORGANIZED HEALTH CARE EDUCATION/TRAINING PROGRAM

## 2024-03-13 PROCEDURE — 1159F MED LIST DOCD IN RCRD: CPT | Mod: CPTII,,, | Performed by: STUDENT IN AN ORGANIZED HEALTH CARE EDUCATION/TRAINING PROGRAM

## 2024-03-13 NOTE — PROGRESS NOTES
[facility]  Established Patient Office Visit  03/13/2024       Patient ID: Darius Rsetrepo  YOB: 1965  MRN: [id number]    Chief Complaint: Post-op Evaluation and Pain of the Right Shoulder (Rt RCR 2/27/24 pain 8/10 takes Blunt as needed for pain sling on )      Past Orthopaedic Surgeries:  Right shoulder arthroscopy with debridement of anterior labral tear and loose body removal, right shoulder arthroscopic biceps tenotomy, right shoulder mini open full-thickness retracted rotator cuff repair massive, right shoulder mini open subacromial decompression 02/27/2024      HPI:  Darius Restrepo is a 59 y.o. male presenting for his 1st postoperative follow-up visit after the above surgery on his right shoulder.  He has been doing well.  His pain is controlled.  He has not yet begun physical therapy.  He has not had any fevers or signs of infection at home.  No new numbness or weakness.  He has been using a sling.  Of note, he had a seizure postoperatively the day after surgery.  He was not formally evaluated in the emergency department.  He has been better since.    12 point ROS performed and negative except as above.     Past Medical History:    Past Medical History:   Diagnosis Date    A-fib     Bipolar affective disorder, mixed     CVA (cerebral vascular accident)     Depression     Epilepsy     GERD (gastroesophageal reflux disease)     Hyperlipidemia     Hypertension     KRISTIN on CPAP     Pacemaker     TIA (transient ischemic attack)      Past Surgical History:   Procedure Laterality Date    A-V CARDIAC PACEMAKER INSERTION N/A 12/02/2022    Procedure: INSERTION, CARDIAC PACEMAKER, DUAL CHAMBER;  Surgeon: Hemant Maravilla DO;  Location: [facility] CATH LAB;  Service: Cardiology;  Laterality: N/A;  EXPLANT OF EVENT RECORDER /  IMPLANT OF DUAL CHAMBER PPM (SJM)    ARTHROSCOPIC DEBRIDEMENT OF SHOULDER Right 2/27/2024    Procedure: DEBRIDEMENT, SHOULDER, ARTHROSCOPIC;  Surgeon: Jeremy Pennington  MD SAI;  Location: [facility];  Service: Orthopedics;  Laterality: Right;    ARTHROSCOPY,SHOULDER,WITH BICEPS TENODESIS Right 2/27/2024    Procedure: ARTHROSCOPY,SHOULDER,WITH BICEPS TENODESIS;  Surgeon: Jeremy Pennington MD;  Location: [facility];  Service: Orthopedics;  Laterality: Right;    DECOMPRESSION OF SUBACROMIAL SPACE Right 2/27/2024    Procedure: DECOMPRESSION, SUBACROMIAL SPACE;  Surgeon: Jeremy Pennington MD;  Location: [facility];  Service: Orthopedics;  Laterality: Right;    FEMUR IM RUBEN REMOVAL      HIP SURGERY Left     Ruben inserted from knee to hip    INSERTION OF IMPLANTABLE LOOP RECORDER  03/25/2022    Dr. Maravilla    INSERTION OF PERMANENT PACEMAKER  12/02/2022    Dr. Maravilla    INTRAMEDULLARY RODDING OF FEMUR      OPEN REDUCTION AND INTERNAL FIXATION (ORIF) OF INJURY OF THUMB      REMOVAL OF IMPLANTABLE LOOP RECORDER  12/02/2022    Dr. Maravilla    ROTATOR CUFF REPAIR Right 2/27/2024    Procedure: REPAIR, ROTATOR CUFF;  Surgeon: Jeremy Pennington MD;  Location: [facility];  Service: Orthopedics;  Laterality: Right;    SKIN TAG REMOVAL      THUMB SURGERY       Family History   Problem Relation Age of Onset    Hypertension Mother     Hypertension Sister     Diabetes Sister     Hypertension Sister     Hypertension Sister     Hypertension Sister     Hypertension Brother     Hypertension Brother     Hypertension Brother     Diabetes Paternal Grandmother      Social History     Socioeconomic History    Marital status:    Tobacco Use    Smoking status: Every Day     Current packs/day: 1.00     Average packs/day: 1 pack/day for 25.3 years (25.3 ttl pk-yrs)     Types: Cigarettes     Start date: 11/28/1998    Smokeless tobacco: Never    Tobacco comments:     Nicotine patches used    Substance and Sexual Activity    Alcohol use: Never     Comment: 1-2 times weekly    Drug use: Not Currently    Sexual activity: Yes     Medication List with Changes/Refills   Current Medications    ALPRAZOLAM (XANAX) 0.5 MG TABLET    TAKE  ONE TABLET BY MOUTH TWICE A DAY AS NEEDED FOR ANXIETY    AMLODIPINE (NORVASC) 10 MG TABLET    Take 10 mg by mouth once daily.    ATORVASTATIN (LIPITOR) 20 MG TABLET    Take 20 mg by mouth once daily.    BRIVIACT 100 MG TAB    Take 100 mg by mouth 2 (two) times daily.    CARVEDILOL (COREG) 12.5 MG TABLET    Take 12.5 mg by mouth 2 (two) times daily.    CHOLECALCIFEROL, VITAMIN D3, 1,250 MCG (50,000 UNIT) CAPSULE    Take 1 capsule (50,000 Units total) by mouth every 7 days.    CLONIDINE 0.3 MG/24 HR TD PTWK (CATAPRES) 0.3 MG/24 HR    Place 1 patch onto the skin every 7 days.    DICLOFENAC (VOLTAREN) 50 MG EC TABLET    Take 1 tablet (50 mg total) by mouth 2 (two) times daily as needed (pain, after food).    EPINEPHRINE (EPIPEN) 0.3 MG/0.3 ML ATIN    Inject 0.3 mLs (0.3 mg total) into the muscle as needed (allergic reaction).    ESCITALOPRAM OXALATE (LEXAPRO) 20 MG TABLET    Take 0.5 tablets (10 mg total) by mouth once daily. After breakfast    FOLIC ACID (FOLVITE) 1 MG TABLET    Take 1 tablet (1 mg total) by mouth once daily. After food    GABAPENTIN (NEURONTIN) 600 MG TABLET    Take 1 tablet (600 mg total) by mouth 3 (three) times daily.    HYDROCHLOROTHIAZIDE (HYDRODIURIL) 12.5 MG TAB    Take 12.5 mg by mouth once daily.    HYDROCODONE-ACETAMINOPHEN (NORCO)  MG PER TABLET    Take 1 tablet by mouth every 8 (eight) hours as needed.    HYDROCODONE-ACETAMINOPHEN (NORCO)  MG PER TABLET    Take 1 tablet by mouth every 6 (six) hours as needed for Pain.    HYDROXYCHLOROQUINE (PLAQUENIL) 200 MG TABLET    Take 1 tablet (200 mg total) by mouth 2 (two) times daily.    LAMOTRIGINE (LAMICTAL) 200 MG TABLET    Take 200 mg by mouth 2 (two) times daily.    LEVETIRACETAM (KEPPRA) 1000 MG TABLET    Take 1,000 mg by mouth 3 (three) times daily.    LOSARTAN (COZAAR) 50 MG TABLET    Take 50 mg by mouth once daily.    MAGNESIUM OXIDE (MAG-OX) 400 MG (241.3 MG MAGNESIUM) TABLET    Take 1 tablet (400 mg total) by mouth nightly.     MELOXICAM (MOBIC) 15 MG TABLET    Take 1 tablet (15 mg total) by mouth once daily.    METHOTREXATE 2.5 MG TAB    Take 6 tablets (15 mg total) by mouth every 7 days. EVERY  MONDAY  TAKE 3 TAB AFTER BREAKFAST AND 3 TAB AFTER SUPPER    RISPERIDONE (RISPERDAL) 1 MG TABLET    Take 1 mg by mouth 2 (two) times daily.    SERTRALINE (ZOLOFT) 100 MG TABLET    Take 100 mg by mouth once daily.     Review of patient's allergies indicates:   Allergen Reactions    Peanut Anaphylaxis       Physical Exam:    Right shoulder  Incisions are well healed with nylon sutures in place  Motor intact: AIN/PIN/Ulnar/Median/Radial  [place]ake: M/U/R without numbness, tingling, or asymmetry  Palp radial pulse    Imaging independently interpreted:  X-rays of the right shoulder obtained today show no acute fracture or dislocation    Assessment and Plan:    Darius Restrepo is a 59 y.o. male with right shoulder massive rotator cuff repair 02/27/2024    -he did have a seizure postoperatively, discussed with him that the radiographs obtained today did not show any fractures or any dislocations.  -wear the sling for 4 more weeks coming out of it to work on range of motion, discontinue the sling completely at his next visit in 2 weeks  -he should begin physical therapy now with the protocol listed below  -no heavy lifting  -mass gen protocol for massive rotator cuff repair    Prateek Aguila MD  [facility] Orthopaedic Surgery PGY-3          Orders Placed This Encounter    X-ray Shoulder 2 or More Views Right    Ambulatory referral/consult to Physical/Occupational Therapy

## 2024-03-15 NOTE — PROGRESS NOTES
Faculty Attestation: Darius Restrepo  was seen at [facility] in the Orthopaedic Clinic. Patient seen and evaluated at the time of the visit. History of Present Illness, Physical Exam, and Assessment and Plan reviewed. Treatment plan is reasonable and appropriate. Compliance with treatment recommendations is important. No procedure was performed.     John Mooney MD  Orthopaedic Surgery

## 2024-03-26 NOTE — HIM RECORD RETIREMENT NOTE
intermediate of Incomplete Medical Record    3/26/24    Patient Name: Darius Restrepo  Contact Serial # (CSN): [id number]  Patient Medical Record # (MRN): [id number]  Date of Service: Office Visit on 6/5/2023  Physician Name: Mnany Burton MD [[id number]]; Manny Burton [[id number]]     This record has been reviewed and is being retired as incomplete by the approval of the  Medical Staff Operating Committee (MSOC)     On 03/18/2024., due to:  Unavailability of Provider     Missing Information/Comments:  []    Discharge Summary   []    DC Note/Short Stay Summary   []    ED Provider Note   []    Delivery Note   []    History & Physical   []   Operative Note   []     Procedure Note   []     Physician Order   [x]     Verbal Order   []       Other, specify:

## 2024-04-01 ENCOUNTER — HOSPITAL ENCOUNTER (EMERGENCY)
Facility: HOSPITAL | Age: 59
Discharge: HOME | End: 2024-04-02
Attending: STUDENT IN AN ORGANIZED HEALTH CARE EDUCATION/TRAINING PROGRAM
Payer: MEDICAID

## 2024-04-01 DIAGNOSIS — I10 PRIMARY HYPERTENSION: ICD-10-CM

## 2024-04-01 DIAGNOSIS — M79.7 FIBROMYALGIA: ICD-10-CM

## 2024-04-01 DIAGNOSIS — R56.9 SEIZURE-LIKE ACTIVITY: Primary | ICD-10-CM

## 2024-04-01 DIAGNOSIS — F32.A DEPRESSIVE DISORDER: ICD-10-CM

## 2024-04-01 DIAGNOSIS — M75.100 TEAR OF ROTATOR CUFF, UNSPECIFIED LATERALITY, UNSPECIFIED TEAR EXTENT, UNSPECIFIED WHETHER TRAUMATIC: ICD-10-CM

## 2024-04-01 DIAGNOSIS — R52 PAIN: ICD-10-CM

## 2024-04-01 DIAGNOSIS — F51.01 PRIMARY INSOMNIA: ICD-10-CM

## 2024-04-01 DIAGNOSIS — S01.81XA LACERATION OF FOREHEAD, INITIAL ENCOUNTER: ICD-10-CM

## 2024-04-01 DIAGNOSIS — M35.9 UNDIFFERENTIATED CONNECTIVE TISSUE DISEASE: ICD-10-CM

## 2024-04-01 LAB
ALBUMIN SERPL-MCNC: 4.1 G/DL (ref 3.5–5)
ALBUMIN/GLOB SERPL: 1.2 RATIO (ref 1.1–2)
ALP SERPL-CCNC: 92 UNIT/L (ref 40–150)
ALT SERPL-CCNC: 611 UNIT/L (ref 0–55)
AST SERPL-CCNC: 278 UNIT/L (ref 5–34)
BASOPHILS # BLD AUTO: 0.03 X10(3)/MCL
BASOPHILS NFR BLD AUTO: 0.5 %
BILIRUB SERPL-MCNC: 0.6 MG/DL
BUN SERPL-MCNC: 10.7 MG/DL (ref 8.4–25.7)
CALCIUM SERPL-MCNC: 9.6 MG/DL (ref 8.4–10.2)
CHLORIDE SERPL-SCNC: 106 MMOL/L (ref 98–107)
CO2 SERPL-SCNC: 22 MMOL/L (ref 22–29)
CREAT SERPL-MCNC: 0.94 MG/DL (ref 0.73–1.18)
EOSINOPHIL # BLD AUTO: 0.24 X10(3)/MCL (ref 0–0.9)
EOSINOPHIL NFR BLD AUTO: 4.3 %
ERYTHROCYTE [DISTWIDTH] IN BLOOD BY AUTOMATED COUNT: 19.2 % (ref 11.5–17)
GFR SERPLBLD CREATININE-BSD FMLA CKD-EPI: >60 MLS/MIN/1.73/M2
GLOBULIN SER-MCNC: 3.5 GM/DL (ref 2.4–3.5)
GLUCOSE SERPL-MCNC: 83 MG/DL (ref 74–100)
HCT VFR BLD AUTO: 47.3 % (ref 42–52)
HGB BLD-MCNC: 15.8 G/DL (ref 14–18)
IMM GRANULOCYTES # BLD AUTO: 0.02 X10(3)/MCL (ref 0–0.04)
IMM GRANULOCYTES NFR BLD AUTO: 0.4 %
LACTATE SERPL-SCNC: 1.7 MMOL/L (ref 0.5–2.2)
LYMPHOCYTES # BLD AUTO: 1.84 X10(3)/MCL (ref 0.6–4.6)
LYMPHOCYTES NFR BLD AUTO: 32.6 %
MCH RBC QN AUTO: 29.6 PG (ref 27–31)
MCHC RBC AUTO-ENTMCNC: 33.4 G/DL (ref 33–36)
MCV RBC AUTO: 88.7 FL (ref 80–94)
MONOCYTES # BLD AUTO: 0.54 X10(3)/MCL (ref 0.1–1.3)
MONOCYTES NFR BLD AUTO: 9.6 %
NEUTROPHILS # BLD AUTO: 2.97 X10(3)/MCL (ref 2.1–9.2)
NEUTROPHILS NFR BLD AUTO: 52.6 %
NRBC BLD AUTO-RTO: 0 %
PLATELET # BLD AUTO: 179 X10(3)/MCL (ref 130–400)
PMV BLD AUTO: 9.6 FL (ref 7.4–10.4)
POTASSIUM SERPL-SCNC: 4.2 MMOL/L (ref 3.5–5.1)
PROT SERPL-MCNC: 7.6 GM/DL (ref 6.4–8.3)
RBC # BLD AUTO: 5.33 X10(6)/MCL (ref 4.7–6.1)
SODIUM SERPL-SCNC: 138 MMOL/L (ref 136–145)
WBC # SPEC AUTO: 5.64 X10(3)/MCL (ref 4.5–11.5)

## 2024-04-01 PROCEDURE — 93005 ELECTROCARDIOGRAM TRACING: CPT

## 2024-04-01 PROCEDURE — 80053 COMPREHEN METABOLIC PANEL: CPT | Performed by: STUDENT IN AN ORGANIZED HEALTH CARE EDUCATION/TRAINING PROGRAM

## 2024-04-01 PROCEDURE — 96365 THER/PROPH/DIAG IV INF INIT: CPT | Mod: 59

## 2024-04-01 PROCEDURE — 85025 COMPLETE CBC W/AUTO DIFF WBC: CPT | Performed by: STUDENT IN AN ORGANIZED HEALTH CARE EDUCATION/TRAINING PROGRAM

## 2024-04-01 PROCEDURE — 93010 ELECTROCARDIOGRAM REPORT: CPT | Mod: ,,, | Performed by: INTERNAL MEDICINE

## 2024-04-01 PROCEDURE — 63600175 PHARM REV CODE 636 W HCPCS: Performed by: STUDENT IN AN ORGANIZED HEALTH CARE EDUCATION/TRAINING PROGRAM

## 2024-04-01 PROCEDURE — 83605 ASSAY OF LACTIC ACID: CPT | Performed by: STUDENT IN AN ORGANIZED HEALTH CARE EDUCATION/TRAINING PROGRAM

## 2024-04-01 PROCEDURE — 99285 EMERGENCY DEPT VISIT HI MDM: CPT | Mod: 25

## 2024-04-01 RX ORDER — LEVETIRACETAM 10 MG/ML
1000 INJECTION INTRAVASCULAR
Status: COMPLETED | OUTPATIENT
Start: 2024-04-01 | End: 2024-04-01

## 2024-04-01 RX ORDER — OXCARBAZEPINE 600 MG/1
600 TABLET, FILM COATED ORAL 2 TIMES DAILY
COMMUNITY

## 2024-04-01 RX ADMIN — LEVETIRACETAM INJECTION 1000 MG: 10 INJECTION INTRAVENOUS at 09:04

## 2024-04-02 VITALS
BODY MASS INDEX: 37.93 KG/M2 | DIASTOLIC BLOOD PRESSURE: 94 MMHG | SYSTOLIC BLOOD PRESSURE: 171 MMHG | OXYGEN SATURATION: 98 % | TEMPERATURE: 98 F | RESPIRATION RATE: 16 BRPM | WEIGHT: 235 LBS | HEART RATE: 95 BPM

## 2024-04-02 LAB
OHS QRS DURATION: 82 MS
OHS QTC CALCULATION: 457 MS

## 2024-04-02 PROCEDURE — 25000003 PHARM REV CODE 250: Performed by: STUDENT IN AN ORGANIZED HEALTH CARE EDUCATION/TRAINING PROGRAM

## 2024-04-02 PROCEDURE — 12011 RPR F/E/E/N/L/M 2.5 CM/<: CPT

## 2024-04-02 RX ORDER — HYDROCODONE BITARTRATE AND ACETAMINOPHEN 5; 325 MG/1; MG/1
1 TABLET ORAL EVERY 8 HOURS PRN
Qty: 8 TABLET | Refills: 0 | Status: SHIPPED | OUTPATIENT
Start: 2024-04-02 | End: 2024-04-17

## 2024-04-02 RX ORDER — HYDROCODONE BITARTRATE AND ACETAMINOPHEN 5; 325 MG/1; MG/1
1 TABLET ORAL
Status: COMPLETED | OUTPATIENT
Start: 2024-04-02 | End: 2024-04-02

## 2024-04-02 RX ORDER — ONDANSETRON 4 MG/1
4 TABLET, ORALLY DISINTEGRATING ORAL EVERY 8 HOURS PRN
Qty: 12 TABLET | Refills: 0 | Status: SHIPPED | OUTPATIENT
Start: 2024-04-02

## 2024-04-02 RX ADMIN — HYDROCODONE BITARTRATE AND ACETAMINOPHEN 1 TABLET: 5; 325 TABLET ORAL at 12:04

## 2024-04-02 NOTE — ED PROVIDER NOTES
Encounter Date: 4/1/2024    SCRIBE #1 NOTE: I, Janis Horner, am scribing for, and in the presence of,  Felix Chawla MD. I have scribed the following portions of the note - Other sections scribed: HPI, ROS, PE.       History     Chief Complaint   Patient presents with    Seizures     Seizure, glf, hit head. Not on blood thinners, gcs15 on arrival. Hx of sz on keppra - reports compliant w/ meds     59 year old male with history of Afib, bipolar disorder, CVA, depression, epilepsy, GERD, HLD, HTN, KRISTIN, TIA, and pacemaker presents to the ED via EMS for possible seizure. Pt reports that he does not remember what happened. Pt fell and hit his head, and possibly had a seizure but notes that he is not sure. He complains of head pain, neck pain, and right shoulder pain secondary to recent rotator cuff surgery. Pt also complains of right sided weakness but notes that this is not new. He is on Keppra and states that he is compliant with it. Pt notes that he has an appointment with his neurologist tomorrow.     The history is provided by the patient. No  was used.     Review of patient's allergies indicates:   Allergen Reactions    Peanut Anaphylaxis     Past Medical History:   Diagnosis Date    A-fib     Bipolar affective disorder, mixed     CVA (cerebral vascular accident)     Depression     Epilepsy     GERD (gastroesophageal reflux disease)     Hyperlipidemia     Hypertension     KRISTIN on CPAP     Pacemaker     TIA (transient ischemic attack)      Past Surgical History:   Procedure Laterality Date    A-V CARDIAC PACEMAKER INSERTION N/A 12/02/2022    Procedure: INSERTION, CARDIAC PACEMAKER, DUAL CHAMBER;  Surgeon: Hemant Maravilla DO;  Location: [facility] CATH LAB;  Service: Cardiology;  Laterality: N/A;  EXPLANT OF EVENT RECORDER /  IMPLANT OF DUAL CHAMBER PPM (SJM)    ARTHROSCOPIC DEBRIDEMENT OF SHOULDER Right 2/27/2024    Procedure: DEBRIDEMENT, SHOULDER, ARTHROSCOPIC;  Surgeon: Jeremy Pennington MD;   Location: [facility] OR;  Service: Orthopedics;  Laterality: Right;    ARTHROSCOPY,SHOULDER,WITH BICEPS TENODESIS Right 2/27/2024    Procedure: ARTHROSCOPY,SHOULDER,WITH BICEPS TENODESIS;  Surgeon: Jeremy Pennington MD;  Location: [facility] OR;  Service: Orthopedics;  Laterality: Right;    DECOMPRESSION OF SUBACROMIAL SPACE Right 2/27/2024    Procedure: DECOMPRESSION, SUBACROMIAL SPACE;  Surgeon: Jeremy Pennington MD;  Location: [facility] OR;  Service: Orthopedics;  Laterality: Right;    FEMUR IM RUBEN REMOVAL      HIP SURGERY Left     Ruben inserted from knee to hip    INSERTION OF IMPLANTABLE LOOP RECORDER  03/25/2022    Dr. Maravilla    INSERTION OF PERMANENT PACEMAKER  12/02/2022    Dr. Maravilla    INTRAMEDULLARY RODDING OF FEMUR      OPEN REDUCTION AND INTERNAL FIXATION (ORIF) OF INJURY OF THUMB      REMOVAL OF IMPLANTABLE LOOP RECORDER  12/02/2022    Dr. Maravilla    ROTATOR CUFF REPAIR Right 2/27/2024    Procedure: REPAIR, ROTATOR CUFF;  Surgeon: Jeremy Pennington MD;  Location: [facility];  Service: Orthopedics;  Laterality: Right;    SKIN TAG REMOVAL      THUMB SURGERY       Family History   Problem Relation Age of Onset    Hypertension Mother     Hypertension Sister     Diabetes Sister     Hypertension Sister     Hypertension Sister     Hypertension Sister     Hypertension Brother     Hypertension Brother     Hypertension Brother     Diabetes Paternal Grandmother      Social History     Tobacco Use    Smoking status: Every Day     Current packs/day: 1.00     Average packs/day: 1 pack/day for 25.3 years (25.3 ttl pk-yrs)     Types: Cigarettes     Start date: 11/28/1998    Smokeless tobacco: Never    Tobacco comments:     Nicotine patches used    Substance Use Topics    Alcohol use: Never     Comment: 1-2 times weekly    Drug use: Not Currently     Review of Systems   Musculoskeletal:  Positive for neck pain.        +right shoulder pain.    Neurological:  Positive for weakness (chronic) and headaches.        +LOC. Possible seizure.         Physical Exam     Initial Vitals [04/01/24 2107]   BP Pulse Resp Temp SpO2   (!) 152/93 89 18 97.9 °F (36.6 °C) (!) 94 %      MAP       --         Physical Exam    Constitutional: He appears well-developed and well-nourished. He is not diaphoretic. No distress.   HENT:   Head: Normocephalic.   Right Ear: External ear normal.   Left Ear: External ear normal.   Nose: Nose normal.   Loose maxillary incisors with minimal blood.   1 cm laceration to center of forehead.    Eyes: EOM are normal. Pupils are equal, round, and reactive to light. Right eye exhibits no discharge. Left eye exhibits no discharge.   Neck:   Neck tenderness to palpation. C-collar in place on arrival to ED.    Cardiovascular:  Normal rate, regular rhythm and normal heart sounds.     Exam reveals no gallop and no friction rub.       No murmur heard.  Pulmonary/Chest: Effort normal and breath sounds normal. No respiratory distress. He has no wheezes. He has no rhonchi. He has no rales. He exhibits no tenderness.   Abdominal: Abdomen is soft. Bowel sounds are normal. He exhibits no distension and no mass. There is no abdominal tenderness. There is no rebound and no guarding.   Musculoskeletal:         General: No edema. Normal range of motion.      Comments: Tenderness to palpation of right shoulder.     Neurological: He is alert and oriented to person, place, and time. No cranial nerve deficit or sensory deficit.   Skin: Skin is warm and dry. Capillary refill takes less than 2 seconds.         ED Course   Lac Repair    Date/Time: 4/2/2024 12:33 AM    Performed by: Felix Chawla MD  Authorized by: Felix Chawla MD    Consent:     Consent obtained:  Verbal    Consent given by:  Patient    Risks discussed:  Infection, pain and poor cosmetic result    Alternatives discussed:  No treatment  Universal protocol:     Patient identity confirmed:  Verbally with patient  Anesthesia:     Anesthesia method:  None  Laceration details:     Location:   Face    Face location:  Forehead    Length (cm):  1  Exploration:     Limited defect created (wound extended): no      Hemostasis achieved with:  Direct pressure    Imaging obtained comment:  Ct    Imaging outcome: foreign body not noted      Contaminated: no    Treatment:     Area cleansed with:  Chlorhexidine    Debridement:  None  Skin repair:     Repair method:  Tissue adhesive  Approximation:     Approximation:  Close  Repair type:     Repair type:  Simple  Post-procedure details:     Dressing:  Open (no dressing)    Procedure completion:  Tolerated well, no immediate complications    Labs Reviewed   COMPREHENSIVE METABOLIC PANEL - Abnormal; Notable for the following components:       Result Value    Alanine Aminotransferase 611 (*)     Aspartate Aminotransferase 278 (*)     All other components within normal limits   CBC WITH DIFFERENTIAL - Abnormal; Notable for the following components:    RDW 19.2 (*)     All other components within normal limits   LACTIC ACID, PLASMA - Normal   CBC W/ AUTO DIFFERENTIAL    Narrative:     The following orders were created for panel order CBC auto differential.  Procedure                               Abnormality         Status                     ---------                               -----------         ------                     CBC with Differential[[id number]]       Abnormal            Final result                 Please view results for these tests on the individual orders.          Imaging Results              CT Maxillofacial Without Contrast (Preliminary result)  Result time 04/01/24 22:02:05      Preliminary result by John Jay MD (04/01/24 22:02:05)                   Narrative:    START OF REPORT:  Technique: Noncontrast maxillofacial CT was performed with axial as well as sagittal and coronal images being submitted for interpretation.    Comparison: Comparison is with study dated 2023-01-21 23:38:43.    Clinical history: Seziure.    Findings:  Orbital soft  tissues: The orbital soft tissues appear unremarkable.  Bones:  Orbital bony structures: The bilateral orbital bony structures are intact with no orbital fracture identified.  Mandible: The mandible appears unremarkable with no fracture identified.  Maxilla: The maxilla appears unremarkable. Stable orthopedic hardware is seen in the anterior left maxillary sinus and orbital floor.  Zygoma: The zygomatic arches are intact with no zygomatic complex fracture identified.  TMJ: The mandibular condyles appear normally placed with respect to the mandibular fossa.  Nasal Bones: The nasal septum is midline. Stable old mildly displaced bilateral nasal bone fractures.  Skull: No acute linear or depressed fracture is identified in the visualized skull.  Paranasal sinuses: The visualized paranasal sinuses appear clear with no mucoperiosteal thickening or air fluid levels identified.  Mastoid air cells: The visualized mastoid air cells appear clear.  Brain: Intracranial findings discussed separately.      Impression:  1. No acute maxillofacial fracture identified. Details and findings as noted above.                                         CT Cervical Spine Without Contrast (Preliminary result)  Result time 04/01/24 22:00:36      Preliminary result by John Jay MD (04/01/24 22:00:36)                   Narrative:    START OF REPORT:  Technique: CT of the cervical spine was performed without intravenous contrast with axial as well as sagittal and coronal images.    Comparison: Comparison is with study dated 02-.    Dosage Information: Automated Exposure Control was utilized 2126.12 mGy.cm.    Clinical history: Seizure.    Findings:  Spine:  Spinal canal: The spinal canal appears unremarkable.  Mineralization: Within normal limits.  Scoliosis: No significant scoliosis is seen.  Vertebral Fusion: No vertebral fusion is identified.  Listhesis: No significant listhesis is identified.  Lordosis: Straightening of the  cervical lordosis is again seen.  Intervertebral disc spaces: Mild loss of disc height in C3-C4, C6-C7 and C7-T1.  Osteophytes: Mild multilevel endplate osteophytes are seen.  Endplate Sclerosis: No significant endplate sclerosis is seen.  Uncovertebral degenerative changes: Moderate bilateral uncovertebral joint arthrosis is seen at C3-C4.  Facet degenerative changes: Mild multilevel facet degenerative changes are seen.  Calcifications: Small calcifications are seen anterior to the C6-C7 and C7-T1. Tiny calcifications are seen posterior to the C7 spinous process. These are likely ligamentous calcifications.  Fractures: No acute cervical spine fracture dislocation or subluxation is seen.      Impression:  1. No acute cervical spine fracture dislocation or subluxation is seen.  2. Degenerative changes and other details as above.                                         CT Head Without Contrast (Preliminary result)  Result time 04/01/24 21:59:53      Preliminary result by John Jay MD (04/01/24 21:59:53)                   Narrative:    START OF REPORT:  Technique: CT of the head was performed without intravenous contrast with axial as well as coronal and sagittal images.    Comparison: Comparison is with study dated 2023/03/12.    Dosage Information: Automated exposure control was utilized.    Clinical history: Seizure.    Findings:  Hemorrhage: No acute intracranial hemorrhage is seen.  CSF spaces: The ventricles sulci and basal cisterns are within normal limits for age.  Brain parenchyma: Unremarkable with preservation of the grey white junction throughout.  Cerebellum: Unremarkable.  Vascular: Unremarkable venous sinuses.  Sella and skull base: The sella appears to be within normal limits for age.  Cerebellopontine angles: Within normal limits.  Herniation: None.  Intracranial calcifications: Incidental note is made of bilateral choroid plexus calcification. Incidental note is made of some pineal region  calcification.  Calvarium: No acute linear or depressed skull fracture is seen.  Scalp: A 2.2 x 1.8 cm stable appearing scalp lipoma is seen along the right parietal bone.    Maxillofacial Structures:  Paranasal sinuses: The visualized paranasal sinuses appear clear with no mucoperiosteal thickening or air fluid levels identified. There is stable orthopedic hardware in the left maxillary region.  Orbits: The orbits appear unremarkable.  Zygomatic arches: The zygomatic arches are intact and unremarkable.  Temporal bones and mastoids: The temporal bones and mastoids appear unremarkable.  TMJ: The mandibular condyles appear normally placed with respect to the mandibular fossa.      Impression:  1. No acute intracranial process identified. Details and other findings as noted above.                                         X-Ray Shoulder Trauma Right (In process)                      X-Ray Chest AP Portable (In process)                      Medications   levETIRAcetam in NaCl (iso-os) IVPB 1,000 mg (0 mg Intravenous Stopped 4/1/24 1273)   HYDROcodone-acetaminophen 5-325 mg per tablet 1 tablet (1 tablet Oral Given 4/2/24 0041)     Medical Decision Making  Differential diagnosis includes but is not limited to seizure, syncope, fall, electrolyte abnormality, renal dysfunction    See ED course for MDM        Amount and/or Complexity of Data Reviewed  External Data Reviewed: notes.     Details: Patient was seen 01/21/2023, 02/21/2023, 03/08/2023 all for seizure-like activity. Chart review also reveals history right shoulder pain, tear of rotator cuff to the right. He was on per chart review Lamictal, Trileptal, Xanax, Keppra.   Labs: ordered. Decision-making details documented in ED Course.  Radiology: ordered and independent interpretation performed. Decision-making details documented in ED Course.     Details: CT head negative for any acute process  ECG/medicine tests: ordered and independent interpretation performed.  Decision-making details documented in ED Course.    Risk  OTC drugs.  Prescription drug management.              Attending Attestation:           Physician Attestation for Scribe:  Physician Attestation Statement for Scribe #1: I, Felix Chawla MD, reviewed documentation, as scribed by Janis Horner in my presence, and it is both accurate and complete.             ED Course as of 04/02/24 0220   Mon Apr 01, 2024 2112 Patient was seen 01/21/2023, 02/21/2023, 03/08/2023 all for seizure-like activity.  Chart review also reveals history right shoulder pain, tear of rotator cuff to the right.  He was on per chart review Lamictal, Trileptal, Xanax, Keppra. [MM]   2227 CBC auto differential(!)  No leukocytosis or anemia. [MM]   2234 EKG done at 10:23 p.m. shows sinus rhythm rate of 86 .  Normal axis.  No ST elevation or depression.  Machine read STEMI there is no ST elevation noted.  Inverted T-waves in lead 3.  [MM]   2306 Lactic Acid Level: 1.7 [MM]   2306 X-Ray Shoulder Trauma Right  Negative for dislocation or fracture [MM]   2306 CT Head Without Contrast  Negative for head bleed [MM]   Tue Apr 02, 2024   0025 Re-evaluation patient resting comfortably.  GCS 15.  Complains of some pain to his mouth.  He does have some loose teeth from the fall.  Unfortunately his negative CT scans.  His neck pain appears to be more on the right lateral.  No midline tenderness.  CT scan negative.  C-collar removed by myself.  No abdominal pain.  Has a laceration to his forehead.  Will proximally with Dermabond, patient tolerated procedure well..  Informed of his elevated liver enzymes encouraged to follow up with his PCP for further evaluation for rechecked.  Patient comfortable with plan bilirubin within normal limits. Return precautions given.  Questions invited, questions answered to the best my ability.  Patient discharged home condition stable.   [MM]      ED Course User Index  [MM] Felix Chawla MD                            Clinical Impression:  Final diagnoses:  [R52] Pain  [R56.9] Seizure-like activity (Primary)  [S01.81XA] Laceration of forehead, initial encounter          ED Disposition Condition    Discharge Stable          ED Prescriptions       Medication Sig Dispense Start Date End Date Auth. Provider    HYDROcodone-acetaminophen (NORCO) 5-325 mg per tablet Take 1 tablet by mouth every 8 (eight) hours as needed for Pain. 8 tablet 4/2/2024 -- Felix Chawla MD    ondansetron (ZOFRAN-ODT) 4 MG TbDL Take 1 tablet (4 mg total) by mouth every 8 (eight) hours as needed (nausea vomiting). 12 tablet 4/2/2024 -- Felix Chawla MD          Follow-up Information       Follow up With Specialties Details Why Contact Info    Karla Vázquez, FNP Nurse Practitioner   [address] [place] [id number]  [phone]               Felix Chawla MD  04/02/24 5934

## 2024-04-02 NOTE — DISCHARGE INSTRUCTIONS

## 2024-04-09 NOTE — PROGRESS NOTES
[facility]  Established Patient Office Visit  04/10/2024       Patient ID: Darius Restrepo  YOB: 1965  MRN: [id number]    Chief Complaint: Post-op Evaluation and Follow-up of the Right Shoulder (F/u RCR from 2/27/2024 pain 7/10 sling intact)    Orthopaedic Surgeries: R shoulder arthroscopy with debridement of anterior labral tear and loose body removal, R shoulder arthroscopic biceps tenotomy, R shoulder mini open full-thickness retracted rotator cuff repair massive, R shoulder mini open subacromial decompression 02/27/2024 [place]    HPI:  Darius Restrepo is a 59 y.o. male s/p above, he is now 6 weeks post op.  His postoperative course has been complicated by multiple seizures.  His most recent 1 was 04/01/2024.  He went to the emergency department.  X-rays were taken at that time in addition to CT scans.  He reports they have increased his antiepileptic dose.  Otherwise he is doing okay, right shoulder feels better.  Of note he does report trying some active range of motion a few days ago, we discussed holding off on this until advanced in his therapy protocol.    12 point ROS performed and negative except as above.     Past Medical History:    Past Medical History:   Diagnosis Date    A-fib     Bipolar affective disorder, mixed     CVA (cerebral vascular accident)     Depression     Epilepsy     GERD (gastroesophageal reflux disease)     Hyperlipidemia     Hypertension     KRISTIN on CPAP     Pacemaker     TIA (transient ischemic attack)      Past Surgical History:   Procedure Laterality Date    A-V CARDIAC PACEMAKER INSERTION N/A 12/02/2022    Procedure: INSERTION, CARDIAC PACEMAKER, DUAL CHAMBER;  Surgeon: Hemant Maravilla DO;  Location: [facility] CATH LAB;  Service: Cardiology;  Laterality: N/A;  EXPLANT OF EVENT RECORDER /  IMPLANT OF DUAL CHAMBER PPM (SJM)    ARTHROSCOPIC DEBRIDEMENT OF SHOULDER Right 2/27/2024    Procedure: DEBRIDEMENT, SHOULDER, ARTHROSCOPIC;  Surgeon:  Jeremy Pennington MD;  Location: [facility];  Service: Orthopedics;  Laterality: Right;    ARTHROSCOPY,SHOULDER,WITH BICEPS TENODESIS Right 2/27/2024    Procedure: ARTHROSCOPY,SHOULDER,WITH BICEPS TENODESIS;  Surgeon: Jeremy Pennington MD;  Location: [facility];  Service: Orthopedics;  Laterality: Right;    DECOMPRESSION OF SUBACROMIAL SPACE Right 2/27/2024    Procedure: DECOMPRESSION, SUBACROMIAL SPACE;  Surgeon: Jeremy Pennington MD;  Location: [facility];  Service: Orthopedics;  Laterality: Right;    FEMUR IM RUBEN REMOVAL      HIP SURGERY Left     Ruben inserted from knee to hip    INSERTION OF IMPLANTABLE LOOP RECORDER  03/25/2022    Dr. Maravilla    INSERTION OF PERMANENT PACEMAKER  12/02/2022    Dr. Maravilla    INTRAMEDULLARY RODDING OF FEMUR      OPEN REDUCTION AND INTERNAL FIXATION (ORIF) OF INJURY OF THUMB      REMOVAL OF IMPLANTABLE LOOP RECORDER  12/02/2022    Dr. Maravilla    ROTATOR CUFF REPAIR Right 2/27/2024    Procedure: REPAIR, ROTATOR CUFF;  Surgeon: Jeremy Pennington MD;  Location: [facility];  Service: Orthopedics;  Laterality: Right;    SKIN TAG REMOVAL      THUMB SURGERY       Family History   Problem Relation Age of Onset    Hypertension Mother     Hypertension Sister     Diabetes Sister     Hypertension Sister     Hypertension Sister     Hypertension Sister     Hypertension Brother     Hypertension Brother     Hypertension Brother     Diabetes Paternal Grandmother      Social History     Socioeconomic History    Marital status:    Tobacco Use    Smoking status: Every Day     Current packs/day: 1.00     Average packs/day: 1 pack/day for 25.4 years (25.4 ttl pk-yrs)     Types: Cigarettes     Start date: 11/28/1998    Smokeless tobacco: Never    Tobacco comments:     Nicotine patches used    Substance and Sexual Activity    Alcohol use: Never     Comment: 1-2 times weekly    Drug use: Not Currently    Sexual activity: Yes     Medication List with Changes/Refills   Current Medications    ALPRAZOLAM (XANAX) 0.5 MG  TABLET    TAKE ONE TABLET BY MOUTH TWICE A DAY AS NEEDED FOR ANXIETY    AMLODIPINE (NORVASC) 10 MG TABLET    Take 10 mg by mouth once daily.    ATORVASTATIN (LIPITOR) 20 MG TABLET    Take 20 mg by mouth once daily.    BRIVIACT 100 MG TAB    Take 100 mg by mouth 2 (two) times daily.    CARVEDILOL (COREG) 12.5 MG TABLET    Take 12.5 mg by mouth 2 (two) times daily.    CHOLECALCIFEROL, VITAMIN D3, 1,250 MCG (50,000 UNIT) CAPSULE    Take 1 capsule (50,000 Units total) by mouth every 7 days.    CLONIDINE 0.3 MG/24 HR TD PTWK (CATAPRES) 0.3 MG/24 HR    Place 1 patch onto the skin every 7 days.    DICLOFENAC (VOLTAREN) 50 MG EC TABLET    Take 1 tablet (50 mg total) by mouth 2 (two) times daily as needed (pain, after food).    EPINEPHRINE (EPIPEN) 0.3 MG/0.3 ML ATIN    Inject 0.3 mLs (0.3 mg total) into the muscle as needed (allergic reaction).    ESCITALOPRAM OXALATE (LEXAPRO) 20 MG TABLET    Take 0.5 tablets (10 mg total) by mouth once daily. After breakfast    FOLIC ACID (FOLVITE) 1 MG TABLET    Take 1 tablet (1 mg total) by mouth once daily. After food    GABAPENTIN (NEURONTIN) 600 MG TABLET    Take 1 tablet (600 mg total) by mouth 3 (three) times daily.    HYDROCHLOROTHIAZIDE (HYDRODIURIL) 12.5 MG TAB    Take 12.5 mg by mouth once daily.    HYDROCODONE-ACETAMINOPHEN (NORCO)  MG PER TABLET    Take 1 tablet by mouth every 8 (eight) hours as needed.    HYDROCODONE-ACETAMINOPHEN (NORCO)  MG PER TABLET    Take 1 tablet by mouth every 6 (six) hours as needed for Pain.    HYDROCODONE-ACETAMINOPHEN (NORCO) 5-325 MG PER TABLET    Take 1 tablet by mouth every 8 (eight) hours as needed for Pain.    HYDROXYCHLOROQUINE (PLAQUENIL) 200 MG TABLET    Take 1 tablet (200 mg total) by mouth 2 (two) times daily.    LAMOTRIGINE (LAMICTAL) 200 MG TABLET    Take 200 mg by mouth 2 (two) times daily.    LEVETIRACETAM (KEPPRA) 1000 MG TABLET    Take 1,500 mg by mouth 2 (two) times daily.    LOSARTAN (COZAAR) 50 MG TABLET    Take 50  mg by mouth once daily.    MAGNESIUM OXIDE (MAG-OX) 400 MG (241.3 MG MAGNESIUM) TABLET    Take 1 tablet (400 mg total) by mouth nightly.    MELOXICAM (MOBIC) 15 MG TABLET    Take 1 tablet (15 mg total) by mouth once daily.    METHOTREXATE 2.5 MG TAB    Take 6 tablets (15 mg total) by mouth every 7 days. EVERY  MONDAY  TAKE 3 TAB AFTER BREAKFAST AND 3 TAB AFTER SUPPER    ONDANSETRON (ZOFRAN-ODT) 4 MG TBDL    Take 1 tablet (4 mg total) by mouth every 8 (eight) hours as needed (nausea vomiting).    OXCARBAZEPINE (TRILEPTAL) 600 MG TAB    Take 600 mg by mouth 2 (two) times daily.    RISPERIDONE (RISPERDAL) 1 MG TABLET    Take 1 mg by mouth 2 (two) times daily.    SERTRALINE (ZOLOFT) 100 MG TABLET    Take 100 mg by mouth once daily.     Review of patient's allergies indicates:   Allergen Reactions    Peanut Anaphylaxis       Physical Exam:  Right upper extremity   Surgical incisions are well healed  No TTP over the shoulder, arm, elbow, forearm, wrist, hand  Motor intact: AIN/PIN/Ulnar/Median/Radial  Lorimor: M/U/R without numbness, tingling, or asymmetry  Palp radial pulse    Imaging independently interpreted:  None    Assessment and Plan:    Darius Restrepo is a 59 y.o. male with R massive rotator cuff repair, 6 weeks out.     Using [facility] protocol - start to wean sling, PT for PROM  -Physical therapy script was given today in addition to a copy of the [facility] protocol  -I think given the number of seizures that he has had postoperatively it is okay to wear the sling for protection, coming out of it for range of motion of the elbow/wrist.  He can wean from it gradually as tolerated    WB Status: NWB RUE   Follow up:6 weeks   XR/to-do next visit: clinical check     Deisy Small MD  [employer] [facility] PGY3

## 2024-04-10 ENCOUNTER — OFFICE VISIT (OUTPATIENT)
Dept: ORTHOPEDICS | Facility: CLINIC | Age: 59
End: 2024-04-10
Payer: MEDICAID

## 2024-04-10 VITALS
HEART RATE: 98 BPM | DIASTOLIC BLOOD PRESSURE: 70 MMHG | HEIGHT: 66 IN | OXYGEN SATURATION: 100 % | RESPIRATION RATE: 20 BRPM | SYSTOLIC BLOOD PRESSURE: 149 MMHG | BODY MASS INDEX: 38.7 KG/M2 | WEIGHT: 240.81 LBS | TEMPERATURE: 99 F

## 2024-04-10 DIAGNOSIS — M75.101 TEAR OF RIGHT ROTATOR CUFF, UNSPECIFIED TEAR EXTENT, UNSPECIFIED WHETHER TRAUMATIC: Primary | ICD-10-CM

## 2024-04-10 PROCEDURE — 3077F SYST BP >= 140 MM HG: CPT | Mod: CPTII,,, | Performed by: SPECIALIST

## 2024-04-10 PROCEDURE — 1159F MED LIST DOCD IN RCRD: CPT | Mod: CPTII,,, | Performed by: SPECIALIST

## 2024-04-10 PROCEDURE — 3078F DIAST BP <80 MM HG: CPT | Mod: CPTII,,, | Performed by: SPECIALIST

## 2024-04-10 PROCEDURE — 99215 OFFICE O/P EST HI 40 MIN: CPT | Mod: PBBFAC

## 2024-04-10 PROCEDURE — 99499 UNLISTED E&M SERVICE: CPT | Mod: ,,, | Performed by: SPECIALIST

## 2024-04-12 DIAGNOSIS — Z98.890 S/P RIGHT ROTATOR CUFF REPAIR: Primary | ICD-10-CM

## 2024-04-15 DIAGNOSIS — S82.831A CLOSED FRACTURE OF DISTAL END OF RIGHT FIBULA, UNSPECIFIED FRACTURE MORPHOLOGY, INITIAL ENCOUNTER: Primary | ICD-10-CM

## 2024-04-16 NOTE — PROGRESS NOTES
[facility]  Established Patient Office Visit  04/17/2024       Patient ID: Darius Restreop  YOB: 1965  MRN: [id number]    Chief Complaint: Pain of the Right Foot (Patient is here today for follow up for  right foot.  Has been taking meds; pain: NSAID Which has as needed for pain, which does help.  )    Orthopaedic Surgeries: R shoulder arthroscopy with debridement of anterior labral tear and loose body removal, R shoulder arthroscopic biceps tenotomy, R shoulder mini open full-thickness retracted rotator cuff repair massive, R shoulder mini open subacromial decompression 02/27/2024 [place]    Ortho Injuries:  Right ankle Vargas B with medial clear space widening (04/14/2024)    HPI:  Darius Restrepo is a 59 y.o. male s/p above, for which we are following him. He is here today with a new acute problem. He sustained a fall on 4/14/2024 and found to have R ankle fracture. He is here today for that problem.     This injury is ipsilateral to his shoulder surgery that he is still recovering from.  He has been having difficulty getting around and ambulating.  He is still in the same regimen from a neurology standpoint during which he continues to have breakthrough seizures, he reports that he is in the process of looking for a new neurologist as his current one no longer accepts his insurance. Continues to have seizures.     12 point ROS performed and negative except as above.     Past Medical History:    Past Medical History:   Diagnosis Date    A-fib     Bipolar affective disorder, mixed     CVA (cerebral vascular accident)     Depression     Epilepsy     GERD (gastroesophageal reflux disease)     Hyperlipidemia     Hypertension     KRISTIN on CPAP     Pacemaker     TIA (transient ischemic attack)      Past Surgical History:   Procedure Laterality Date    A-V CARDIAC PACEMAKER INSERTION N/A 12/02/2022    Procedure: INSERTION, CARDIAC PACEMAKER, DUAL CHAMBER;  Surgeon: Hemant Maravilla  DO;  Location: [facility] CATH LAB;  Service: Cardiology;  Laterality: N/A;  EXPLANT OF EVENT RECORDER /  IMPLANT OF DUAL CHAMBER PPM (SJM)    ARTHROSCOPIC DEBRIDEMENT OF SHOULDER Right 2/27/2024    Procedure: DEBRIDEMENT, SHOULDER, ARTHROSCOPIC;  Surgeon: Jeremy Pennington MD;  Location: [facility];  Service: Orthopedics;  Laterality: Right;    ARTHROSCOPY,SHOULDER,WITH BICEPS TENODESIS Right 2/27/2024    Procedure: ARTHROSCOPY,SHOULDER,WITH BICEPS TENODESIS;  Surgeon: Jeremy Pennington MD;  Location: [facility] OR;  Service: Orthopedics;  Laterality: Right;    DECOMPRESSION OF SUBACROMIAL SPACE Right 2/27/2024    Procedure: DECOMPRESSION, SUBACROMIAL SPACE;  Surgeon: Jeremy Pennington MD;  Location: [facility] OR;  Service: Orthopedics;  Laterality: Right;    FEMUR IM RUBEN REMOVAL      HIP SURGERY Left     Ruben inserted from knee to hip    INSERTION OF IMPLANTABLE LOOP RECORDER  03/25/2022    Dr. Maravilla    INSERTION OF PERMANENT PACEMAKER  12/02/2022    Dr. Maravilla    INTRAMEDULLARY RODDING OF FEMUR      OPEN REDUCTION AND INTERNAL FIXATION (ORIF) OF INJURY OF THUMB      REMOVAL OF IMPLANTABLE LOOP RECORDER  12/02/2022    Dr. Maravilla    ROTATOR CUFF REPAIR Right 2/27/2024    Procedure: REPAIR, ROTATOR CUFF;  Surgeon: Jeremy Pennington MD;  Location: [facility];  Service: Orthopedics;  Laterality: Right;    SKIN TAG REMOVAL      THUMB SURGERY       Family History   Problem Relation Name Age of Onset    Hypertension Mother      Hypertension Sister      Diabetes Sister      Hypertension Sister      Hypertension Sister      Hypertension Sister      Hypertension Brother      Hypertension Brother      Hypertension Brother      Diabetes Paternal Grandmother       Social History     Socioeconomic History    Marital status:    Tobacco Use    Smoking status: Every Day     Current packs/day: 1.00     Average packs/day: 1 pack/day for 25.4 years (25.4 ttl pk-yrs)     Types: Cigarettes     Start date: 11/28/1998    Smokeless tobacco: Never    Tobacco  comments:     Nicotine patches used    Substance and Sexual Activity    Alcohol use: Never     Comment: 1-2 times weekly    Drug use: Not Currently    Sexual activity: Yes     Medication List with Changes/Refills   Current Medications    ALPRAZOLAM (XANAX) 0.5 MG TABLET    TAKE ONE TABLET BY MOUTH TWICE A DAY AS NEEDED FOR ANXIETY    AMLODIPINE (NORVASC) 10 MG TABLET    Take 10 mg by mouth once daily.    ATORVASTATIN (LIPITOR) 20 MG TABLET    Take 20 mg by mouth once daily.    BRIVIACT 100 MG TAB    Take 100 mg by mouth 2 (two) times daily.    CARVEDILOL (COREG) 12.5 MG TABLET    Take 12.5 mg by mouth 2 (two) times daily.    CHOLECALCIFEROL, VITAMIN D3, 1,250 MCG (50,000 UNIT) CAPSULE    Take 1 capsule (50,000 Units total) by mouth every 7 days.    CLONIDINE 0.3 MG/24 HR TD PTWK (CATAPRES) 0.3 MG/24 HR    Place 1 patch onto the skin every 7 days.    CYCLOBENZAPRINE (FLEXERIL) 10 MG TABLET    Take 1 tablet by mouth every evening.    DICLOFENAC (VOLTAREN) 50 MG EC TABLET    Take 1 tablet (50 mg total) by mouth 2 (two) times daily as needed (pain, after food).    EPINEPHRINE (EPIPEN) 0.3 MG/0.3 ML ATIN    Inject 0.3 mLs (0.3 mg total) into the muscle as needed (allergic reaction).    ESCITALOPRAM OXALATE (LEXAPRO) 20 MG TABLET    Take 0.5 tablets (10 mg total) by mouth once daily. After breakfast    FOLIC ACID (FOLVITE) 1 MG TABLET    Take 1 tablet (1 mg total) by mouth once daily. After food    HYDROCHLOROTHIAZIDE (HYDRODIURIL) 12.5 MG TAB    Take 12.5 mg by mouth once daily.    HYDROCODONE-ACETAMINOPHEN (NORCO)  MG PER TABLET    Take 1 tablet by mouth 3 (three) times daily as needed.    HYDROXYCHLOROQUINE (PLAQUENIL) 200 MG TABLET    Take 1 tablet (200 mg total) by mouth 2 (two) times daily.    LAMOTRIGINE (LAMICTAL) 200 MG TABLET    Take 200 mg by mouth 2 (two) times daily.    LEVETIRACETAM (KEPPRA) 1000 MG TABLET    Take 1,500 mg by mouth 2 (two) times daily.    LOSARTAN (COZAAR) 50 MG TABLET    Take 50 mg by  mouth once daily.    MAGNESIUM OXIDE (MAG-OX) 400 MG (241.3 MG MAGNESIUM) TABLET    Take 1 tablet (400 mg total) by mouth nightly.    MELOXICAM (MOBIC) 15 MG TABLET    Take 1 tablet (15 mg total) by mouth once daily.    METHOCARBAMOL (ROBAXIN) 750 MG TAB    TAKE 1 TABLET BY MOUTH once daily Oral for 10 days    METHOTREXATE 2.5 MG TAB    Take 6 tablets (15 mg total) by mouth every 7 days. EVERY  MONDAY  TAKE 3 TAB AFTER BREAKFAST AND 3 TAB AFTER SUPPER    ONDANSETRON (ZOFRAN-ODT) 4 MG TBDL    Take 1 tablet (4 mg total) by mouth every 8 (eight) hours as needed (nausea vomiting).    OXCARBAZEPINE (TRILEPTAL) 600 MG TAB    Take 600 mg by mouth 2 (two) times daily.    PREGABALIN (LYRICA) 50 MG CAPSULE    Take 50 mg by mouth 3 (three) times daily.    RISPERIDONE (RISPERDAL) 1 MG TABLET    Take 1 mg by mouth 2 (two) times daily.    SERTRALINE (ZOLOFT) 100 MG TABLET    Take 100 mg by mouth once daily.    SILDENAFIL (VIAGRA) 50 MG TABLET    50 mg.    TRAZODONE (DESYREL) 100 MG TABLET    Take 100 mg by mouth every evening.    VALACYCLOVIR (VALTREX) 500 MG TABLET    Take 500 mg by mouth 2 (two) times daily.    VALSARTAN-HYDROCHLOROTHIAZIDE (DIOVAN-HCT) 320-25 MG PER TABLET    Take 1 tablet by mouth.   Discontinued Medications    GABAPENTIN (NEURONTIN) 600 MG TABLET    Take 1 tablet (600 mg total) by mouth 3 (three) times daily.    HYDROCODONE-ACETAMINOPHEN (NORCO)  MG PER TABLET    Take 1 tablet by mouth every 8 (eight) hours as needed.    HYDROCODONE-ACETAMINOPHEN (NORCO)  MG PER TABLET    Take 1 tablet by mouth every 6 (six) hours as needed for Pain.    HYDROCODONE-ACETAMINOPHEN (NORCO) 5-325 MG PER TABLET    Take 1 tablet by mouth every 8 (eight) hours as needed for Pain.     Review of patient's allergies indicates:   Allergen Reactions    Peanut Anaphylaxis       Physical Exam:  R ankle:   Swollen, no skin wrinkles  Warm and well perfused  Able to dorsiflex/plantar flex    Imaging independently  interpreted:  Right ankle:  Minimally displaced Vargas B fibula fracture, there was about 2 or 3 mm widening on gravity stress    Assessment and Plan:    Darius Restrepo is a 59 y.o. male with R massive rotator cuff repair (see above), now with new problem of right Vargas B ankle fracture with medial clear space widening.     -We had a long discussion with the patient, he is poor surgical candidate given his seizure disorder. I discussed my concerns with putting additional hardware while he is having these uncontrolled seizures and falls.  I offered the option of nonoperatively treating this injury in a cast.  He understands that without these additional health problems he will be indicated for surgical intervention.  He also understands that there is a chance that this fracture or his syndesmosis would not heal with nonoperative treatment. He would like to proceed with nonoperative management.     -We will cast for 6 weeks total and then transition to a CAM   -8 weeks total of NWB   -Short-leg cast today, we will see him back in 3 weeks for cast exchange  -DME for knee scooter given     Deisy Small MD  [facility] PGY3          Orders Placed This Encounter    X-Ray Ankle Complete Right    X-Ray Foot Complete Right

## 2024-04-17 ENCOUNTER — HOSPITAL ENCOUNTER (OUTPATIENT)
Dept: RADIOLOGY | Facility: HOSPITAL | Age: 59
Discharge: HOME | End: 2024-04-17
Attending: STUDENT IN AN ORGANIZED HEALTH CARE EDUCATION/TRAINING PROGRAM
Payer: MEDICARE

## 2024-04-17 ENCOUNTER — OFFICE VISIT (OUTPATIENT)
Dept: ORTHOPEDICS | Facility: CLINIC | Age: 59
End: 2024-04-17
Payer: MEDICARE

## 2024-04-17 VITALS
WEIGHT: 240.75 LBS | HEIGHT: 62 IN | SYSTOLIC BLOOD PRESSURE: 143 MMHG | HEART RATE: 109 BPM | DIASTOLIC BLOOD PRESSURE: 93 MMHG | BODY MASS INDEX: 44.3 KG/M2

## 2024-04-17 DIAGNOSIS — M79.671 PAIN IN RIGHT FOOT: ICD-10-CM

## 2024-04-17 DIAGNOSIS — M79.671 PAIN IN RIGHT FOOT: Primary | ICD-10-CM

## 2024-04-17 PROCEDURE — 73610 X-RAY EXAM OF ANKLE: CPT | Mod: TC,RT

## 2024-04-17 PROCEDURE — 3077F SYST BP >= 140 MM HG: CPT | Mod: CPTII,,, | Performed by: ORTHOPAEDIC SURGERY

## 2024-04-17 PROCEDURE — 99215 OFFICE O/P EST HI 40 MIN: CPT | Mod: PBBFAC,25

## 2024-04-17 PROCEDURE — 1159F MED LIST DOCD IN RCRD: CPT | Mod: CPTII,,, | Performed by: ORTHOPAEDIC SURGERY

## 2024-04-17 PROCEDURE — 3008F BODY MASS INDEX DOCD: CPT | Mod: CPTII,,, | Performed by: ORTHOPAEDIC SURGERY

## 2024-04-17 PROCEDURE — 99213 OFFICE O/P EST LOW 20 MIN: CPT | Mod: S$PBB,,, | Performed by: ORTHOPAEDIC SURGERY

## 2024-04-17 PROCEDURE — 3080F DIAST BP >= 90 MM HG: CPT | Mod: CPTII,,, | Performed by: ORTHOPAEDIC SURGERY

## 2024-04-17 PROCEDURE — 73630 X-RAY EXAM OF FOOT: CPT | Mod: TC,RT

## 2024-04-17 RX ORDER — HYDROCODONE BITARTRATE AND ACETAMINOPHEN 10; 325 MG/1; MG/1
1 TABLET ORAL 3 TIMES DAILY PRN
COMMUNITY

## 2024-04-17 RX ORDER — CYCLOBENZAPRINE HCL 10 MG
1 TABLET ORAL NIGHTLY
COMMUNITY
Start: 2023-09-28

## 2024-04-17 RX ORDER — PREGABALIN 50 MG/1
50 CAPSULE ORAL 3 TIMES DAILY
COMMUNITY
Start: 2024-02-06

## 2024-04-17 RX ORDER — SILDENAFIL 50 MG/1
50 TABLET, FILM COATED ORAL
COMMUNITY
Start: 2024-03-05

## 2024-04-17 RX ORDER — TRAZODONE HYDROCHLORIDE 100 MG/1
100 TABLET ORAL NIGHTLY
COMMUNITY

## 2024-04-17 RX ORDER — VALACYCLOVIR HYDROCHLORIDE 500 MG/1
500 TABLET, FILM COATED ORAL 2 TIMES DAILY
COMMUNITY
Start: 2024-01-05

## 2024-04-17 RX ORDER — METHOCARBAMOL 750 MG/1
TABLET, FILM COATED ORAL
COMMUNITY

## 2024-04-17 RX ORDER — VALSARTAN AND HYDROCHLOROTHIAZIDE 320; 25 MG/1; MG/1
1 TABLET, FILM COATED ORAL
COMMUNITY

## 2024-04-17 NOTE — PROGRESS NOTES
Faculty Attestation: Darius Restrepo  was seen at [facility] in the Orthopaedic Clinic. Patient seen and evaluated at the time of the visit. History of Present Illness, Physical Exam, and Assessment and Plan reviewed. Treatment plan is reasonable and appropriate. Compliance with treatment recommendations is important. No procedure was performed.  Patient was seen, examined, and chart was reviewed.  We will continue conservative treatment.  We have discussed the pros and cons to this.      Jeremy Pennington MD  Orthopaedic Surgery

## 2024-05-12 NOTE — PROGRESS NOTES
[facility]  Established Patient Office Visit  05/13/2024       Patient ID: Darius Restrepo  YOB: 1965  MRN: [id number]    Chief Complaint: Follow-up of the Right Ankle (Right ankle fracture doing good Pain is still there using scooter to get around)    Orthopaedic Surgeries: R shoulder arthroscopy with debridement of anterior labral tear and loose body removal, R shoulder arthroscopic biceps tenotomy, R shoulder mini open full-thickness retracted rotator cuff repair massive, R shoulder mini open subacromial decompression 02/27/2024 [place]    Ortho Injuries:  Right ankle Vargas B with medial clear space widening (04/14/2024)    HPI:  Darius Restrepo is a 59 y.o. male s/p above, for which we are following him. He is here today with a new acute problem. He sustained a fall on 4/14/2024 and found to have R ankle fracture. He is here today for that problem.     This injury is ipsilateral to his shoulder surgery that he is still recovering from.  He has been having difficulty getting around and ambulating.  He is still in the same regimen from a neurology standpoint during which he continues to have breakthrough seizures, he reports that he is in the process of looking for a new neurologist as his current one no longer accepts his insurance. Continues to have seizures.     Interval: Here today for follow up of R ankle Vargas B fx that we are treating nonoperatively.     12 point ROS performed and negative except as above.     Past Medical History:    Past Medical History:   Diagnosis Date    A-fib     Bipolar affective disorder, mixed     CVA (cerebral vascular accident)     Depression     Epilepsy     GERD (gastroesophageal reflux disease)     Hyperlipidemia     Hypertension     KRISTIN on CPAP     Pacemaker     TIA (transient ischemic attack)      Past Surgical History:   Procedure Laterality Date    A-V CARDIAC PACEMAKER INSERTION N/A 12/02/2022    Procedure: INSERTION, CARDIAC  PACEMAKER, DUAL CHAMBER;  Surgeon: Hemant Maravilla DO;  Location: [facility] CATH LAB;  Service: Cardiology;  Laterality: N/A;  EXPLANT OF EVENT RECORDER /  IMPLANT OF DUAL CHAMBER PPM (SJM)    ARTHROSCOPIC DEBRIDEMENT OF SHOULDER Right 2/27/2024    Procedure: DEBRIDEMENT, SHOULDER, ARTHROSCOPIC;  Surgeon: Jeremy Pennington MD;  Location: [facility];  Service: Orthopedics;  Laterality: Right;    ARTHROSCOPY,SHOULDER,WITH BICEPS TENODESIS Right 2/27/2024    Procedure: ARTHROSCOPY,SHOULDER,WITH BICEPS TENODESIS;  Surgeon: Jeremy Pennington MD;  Location: [facility] OR;  Service: Orthopedics;  Laterality: Right;    DECOMPRESSION OF SUBACROMIAL SPACE Right 2/27/2024    Procedure: DECOMPRESSION, SUBACROMIAL SPACE;  Surgeon: Jeremy Pennington MD;  Location: [facility];  Service: Orthopedics;  Laterality: Right;    FEMUR IM RUBEN REMOVAL      HIP SURGERY Left     Ruben inserted from knee to hip    INSERTION OF IMPLANTABLE LOOP RECORDER  03/25/2022    Dr. Maravilla    INSERTION OF PERMANENT PACEMAKER  12/02/2022    Dr. Maravilla    INTRAMEDULLARY RODDING OF FEMUR      OPEN REDUCTION AND INTERNAL FIXATION (ORIF) OF INJURY OF THUMB      REMOVAL OF IMPLANTABLE LOOP RECORDER  12/02/2022    Dr. Maravilla    ROTATOR CUFF REPAIR Right 2/27/2024    Procedure: REPAIR, ROTATOR CUFF;  Surgeon: Jeremy Pennington MD;  Location: [facility];  Service: Orthopedics;  Laterality: Right;    SKIN TAG REMOVAL      THUMB SURGERY       Family History   Problem Relation Name Age of Onset    Hypertension Mother      Hypertension Sister      Diabetes Sister      Hypertension Sister      Hypertension Sister      Hypertension Sister      Hypertension Brother      Hypertension Brother      Hypertension Brother      Diabetes Paternal Grandmother       Social History     Socioeconomic History    Marital status:    Tobacco Use    Smoking status: Every Day     Current packs/day: 1.00     Average packs/day: 1 pack/day for 25.5 years (25.5 ttl pk-yrs)     Types: Cigarettes     Start date:  11/28/1998    Smokeless tobacco: Never    Tobacco comments:     Nicotine patches used    Substance and Sexual Activity    Alcohol use: Never     Comment: 1-2 times weekly    Drug use: Not Currently    Sexual activity: Yes     Medication List with Changes/Refills   Current Medications    ALPRAZOLAM (XANAX) 0.5 MG TABLET    TAKE ONE TABLET BY MOUTH TWICE A DAY AS NEEDED FOR ANXIETY    AMLODIPINE (NORVASC) 10 MG TABLET    Take 10 mg by mouth once daily.    ATORVASTATIN (LIPITOR) 20 MG TABLET    Take 20 mg by mouth once daily.    BRIVIACT 100 MG TAB    Take 100 mg by mouth 2 (two) times daily.    CARVEDILOL (COREG) 12.5 MG TABLET    Take 12.5 mg by mouth 2 (two) times daily.    CHOLECALCIFEROL, VITAMIN D3, 1,250 MCG (50,000 UNIT) CAPSULE    Take 1 capsule (50,000 Units total) by mouth every 7 days.    CLONIDINE 0.3 MG/24 HR TD PTWK (CATAPRES) 0.3 MG/24 HR    Place 1 patch onto the skin every 7 days.    CYCLOBENZAPRINE (FLEXERIL) 10 MG TABLET    Take 1 tablet by mouth every evening.    DICLOFENAC (VOLTAREN) 50 MG EC TABLET    Take 1 tablet (50 mg total) by mouth 2 (two) times daily as needed (pain, after food).    EPINEPHRINE (EPIPEN) 0.3 MG/0.3 ML ATIN    Inject 0.3 mLs (0.3 mg total) into the muscle as needed (allergic reaction).    ESCITALOPRAM OXALATE (LEXAPRO) 20 MG TABLET    Take 0.5 tablets (10 mg total) by mouth once daily. After breakfast    FOLIC ACID (FOLVITE) 1 MG TABLET    Take 1 tablet (1 mg total) by mouth once daily. After food    HYDROCHLOROTHIAZIDE (HYDRODIURIL) 12.5 MG TAB    Take 12.5 mg by mouth once daily.    HYDROCODONE-ACETAMINOPHEN (NORCO)  MG PER TABLET    Take 1 tablet by mouth 3 (three) times daily as needed.    HYDROXYCHLOROQUINE (PLAQUENIL) 200 MG TABLET    Take 1 tablet (200 mg total) by mouth 2 (two) times daily.    LAMOTRIGINE (LAMICTAL) 200 MG TABLET    Take 200 mg by mouth 2 (two) times daily.    LEVETIRACETAM (KEPPRA) 1000 MG TABLET    Take 1,500 mg by mouth 2 (two) times daily.     LOSARTAN (COZAAR) 50 MG TABLET    Take 50 mg by mouth once daily.    MAGNESIUM OXIDE (MAG-OX) 400 MG (241.3 MG MAGNESIUM) TABLET    Take 1 tablet (400 mg total) by mouth nightly.    MELOXICAM (MOBIC) 15 MG TABLET    Take 1 tablet (15 mg total) by mouth once daily.    METHOCARBAMOL (ROBAXIN) 750 MG TAB    TAKE 1 TABLET BY MOUTH once daily Oral for 10 days    METHOTREXATE 2.5 MG TAB    Take 6 tablets (15 mg total) by mouth every 7 days. EVERY  MONDAY  TAKE 3 TAB AFTER BREAKFAST AND 3 TAB AFTER SUPPER    ONDANSETRON (ZOFRAN-ODT) 4 MG TBDL    Take 1 tablet (4 mg total) by mouth every 8 (eight) hours as needed (nausea vomiting).    OXCARBAZEPINE (TRILEPTAL) 600 MG TAB    Take 600 mg by mouth 2 (two) times daily.    PREGABALIN (LYRICA) 50 MG CAPSULE    Take 50 mg by mouth 3 (three) times daily.    RISPERIDONE (RISPERDAL) 1 MG TABLET    Take 1 mg by mouth 2 (two) times daily.    SERTRALINE (ZOLOFT) 100 MG TABLET    Take 100 mg by mouth once daily.    SILDENAFIL (VIAGRA) 50 MG TABLET    50 mg.    TRAZODONE (DESYREL) 100 MG TABLET    Take 100 mg by mouth every evening.    VALACYCLOVIR (VALTREX) 500 MG TABLET    Take 500 mg by mouth 2 (two) times daily.    VALSARTAN-HYDROCHLOROTHIAZIDE (DIOVAN-HCT) 320-25 MG PER TABLET    Take 1 tablet by mouth.     Review of patient's allergies indicates:   Allergen Reactions    Peanut Anaphylaxis       Physical Exam:  R ankle:   Swollen, no skin wrinkles  Warm and well perfused  Able to dorsiflex/plantar flex    Imaging independently interpreted:  Right ankle:  Minimally displaced Vargas B fibula fracture, there was about 2 or 3 mm widening on gravity stress    Assessment and Plan:    Darius Restrepo is a 59 y.o. male with R massive rotator cuff repair (see above), now with new problem of right Vargas B ankle fracture with medial clear space widening.     -We had a long discussion with the patient, he is poor surgical candidate given his seizure disorder. I discussed my concerns with  putting additional hardware while he is having these uncontrolled seizures and falls.  I offered the option of nonoperatively treating this injury in a cast.  He understands that without these additional health problems he would be indicated for surgical intervention.  He also understands that there is a chance that this fracture or his syndesmosis would not heal with nonoperative treatment. He would like to proceed with nonoperative management.     -Transition to CAM book - NWB for 3 additional weeks then will progress WB in CAM boot   -He wants to discuss R shoulder next visit as well     Deisy Small MD  [employer] [facility] PGY3          Orders Placed This Encounter    X-Ray Ankle Complete Right

## 2024-05-13 ENCOUNTER — OFFICE VISIT (OUTPATIENT)
Dept: ORTHOPEDICS | Facility: CLINIC | Age: 59
End: 2024-05-13
Payer: MEDICARE

## 2024-05-13 ENCOUNTER — HOSPITAL ENCOUNTER (OUTPATIENT)
Dept: RADIOLOGY | Facility: HOSPITAL | Age: 59
Discharge: HOME | End: 2024-05-13
Attending: STUDENT IN AN ORGANIZED HEALTH CARE EDUCATION/TRAINING PROGRAM
Payer: MEDICARE

## 2024-05-13 VITALS
SYSTOLIC BLOOD PRESSURE: 142 MMHG | HEIGHT: 65 IN | WEIGHT: 260 LBS | DIASTOLIC BLOOD PRESSURE: 96 MMHG | BODY MASS INDEX: 43.32 KG/M2 | TEMPERATURE: 98 F | HEART RATE: 94 BPM

## 2024-05-13 DIAGNOSIS — S82.831A CLOSED FRACTURE OF DISTAL END OF RIGHT FIBULA, UNSPECIFIED FRACTURE MORPHOLOGY, INITIAL ENCOUNTER: Primary | ICD-10-CM

## 2024-05-13 DIAGNOSIS — S82.831A CLOSED FRACTURE OF DISTAL END OF RIGHT FIBULA, UNSPECIFIED FRACTURE MORPHOLOGY, INITIAL ENCOUNTER: ICD-10-CM

## 2024-05-13 PROCEDURE — 3008F BODY MASS INDEX DOCD: CPT | Mod: CPTII,,, | Performed by: ORTHOPAEDIC SURGERY

## 2024-05-13 PROCEDURE — 1159F MED LIST DOCD IN RCRD: CPT | Mod: CPTII,,, | Performed by: ORTHOPAEDIC SURGERY

## 2024-05-13 PROCEDURE — 99213 OFFICE O/P EST LOW 20 MIN: CPT | Mod: S$PBB,24,, | Performed by: ORTHOPAEDIC SURGERY

## 2024-05-13 PROCEDURE — 3080F DIAST BP >= 90 MM HG: CPT | Mod: CPTII,,, | Performed by: ORTHOPAEDIC SURGERY

## 2024-05-13 PROCEDURE — 3077F SYST BP >= 140 MM HG: CPT | Mod: CPTII,,, | Performed by: ORTHOPAEDIC SURGERY

## 2024-05-13 PROCEDURE — 73610 X-RAY EXAM OF ANKLE: CPT | Mod: TC,RT

## 2024-05-13 PROCEDURE — 99215 OFFICE O/P EST HI 40 MIN: CPT | Mod: PBBFAC,25

## 2024-05-13 NOTE — PROGRESS NOTES
Faculty Attestation: Darius Restrepo  was seen at [facility] in the Orthopaedic Clinic. History of Present Illness, Physical Exam, and Assessment and Plan reviewed. Treatment plan is reasonable and appropriate. Compliance with treatment recommendations is important. Patient seen and evaluated at the time of the visit.  No procedure was performed.     Bg Hitchcock Jr, MD  Orthopaedic Surgery

## 2024-06-10 ENCOUNTER — HOSPITAL ENCOUNTER (OUTPATIENT)
Dept: RADIOLOGY | Facility: HOSPITAL | Age: 59
Discharge: HOME | End: 2024-06-10
Attending: STUDENT IN AN ORGANIZED HEALTH CARE EDUCATION/TRAINING PROGRAM
Payer: MEDICARE

## 2024-06-10 ENCOUNTER — OFFICE VISIT (OUTPATIENT)
Dept: ORTHOPEDICS | Facility: CLINIC | Age: 59
End: 2024-06-10
Payer: MEDICARE

## 2024-06-10 VITALS
HEART RATE: 84 BPM | RESPIRATION RATE: 20 BRPM | WEIGHT: 249.19 LBS | BODY MASS INDEX: 41.52 KG/M2 | DIASTOLIC BLOOD PRESSURE: 94 MMHG | TEMPERATURE: 98 F | OXYGEN SATURATION: 98 % | HEIGHT: 65 IN | SYSTOLIC BLOOD PRESSURE: 166 MMHG

## 2024-06-10 DIAGNOSIS — G89.29 CHRONIC RIGHT SHOULDER PAIN: ICD-10-CM

## 2024-06-10 DIAGNOSIS — M25.511 CHRONIC RIGHT SHOULDER PAIN: ICD-10-CM

## 2024-06-10 DIAGNOSIS — S82.831A CLOSED FRACTURE OF DISTAL END OF RIGHT FIBULA, UNSPECIFIED FRACTURE MORPHOLOGY, INITIAL ENCOUNTER: ICD-10-CM

## 2024-06-10 DIAGNOSIS — S82.831A CLOSED FRACTURE OF DISTAL END OF RIGHT FIBULA, UNSPECIFIED FRACTURE MORPHOLOGY, INITIAL ENCOUNTER: Primary | ICD-10-CM

## 2024-06-10 PROCEDURE — 1159F MED LIST DOCD IN RCRD: CPT | Mod: CPTII,,, | Performed by: STUDENT IN AN ORGANIZED HEALTH CARE EDUCATION/TRAINING PROGRAM

## 2024-06-10 PROCEDURE — 73610 X-RAY EXAM OF ANKLE: CPT | Mod: TC,RT

## 2024-06-10 PROCEDURE — 3077F SYST BP >= 140 MM HG: CPT | Mod: CPTII,,, | Performed by: STUDENT IN AN ORGANIZED HEALTH CARE EDUCATION/TRAINING PROGRAM

## 2024-06-10 PROCEDURE — 73030 X-RAY EXAM OF SHOULDER: CPT | Mod: TC,RT

## 2024-06-10 PROCEDURE — 99213 OFFICE O/P EST LOW 20 MIN: CPT | Mod: S$PBB,,, | Performed by: STUDENT IN AN ORGANIZED HEALTH CARE EDUCATION/TRAINING PROGRAM

## 2024-06-10 PROCEDURE — 3080F DIAST BP >= 90 MM HG: CPT | Mod: CPTII,,, | Performed by: STUDENT IN AN ORGANIZED HEALTH CARE EDUCATION/TRAINING PROGRAM

## 2024-06-10 PROCEDURE — 3008F BODY MASS INDEX DOCD: CPT | Mod: CPTII,,, | Performed by: STUDENT IN AN ORGANIZED HEALTH CARE EDUCATION/TRAINING PROGRAM

## 2024-06-10 PROCEDURE — 99215 OFFICE O/P EST HI 40 MIN: CPT | Mod: PBBFAC,25

## 2024-06-10 NOTE — PROGRESS NOTES
[facility]  Established Patient Office Visit  06/10/2024       Patient ID: Darius Restrepo  YOB: 1965  MRN: [id number]    Chief Complaint: Follow-up, Pain, and Injury of the Right Ankle (Follow-up right ankle injury pain 10/10 wears cam boot and uses cane for ambulation. Takes Norco as needed for pain) and Follow-up and Pain of the Right Shoulder (Follow-up right shoulder pain 7/10 takes [place] as needed)    Orthopaedic Surgeries: R shoulder arthroscopy with debridement of anterior labral tear and loose body removal, R shoulder arthroscopic biceps tenotomy, R shoulder mini open full-thickness retracted rotator cuff repair massive, R shoulder mini open subacromial decompression 02/27/2024 [place]    [facility] Injuries:  Right ankle Vargas B with medial clear space widening (04/14/2024)    HPI:  Darius Restrepo is a 59 y.o. male s/p above, for which we are following him. He is here today with a new acute problem. He sustained a fall on 4/14/2024 and found to have R ankle fracture. He is here today for that problem.     This injury is ipsilateral to his shoulder surgery that he is still recovering from.  He has been having difficulty getting around and ambulating.  He is still in the same regimen from a neurology standpoint during which he continues to have breakthrough seizures, he reports that he is in the process of looking for a new neurologist as his current one no longer accepts his insurance. Continues to have seizures.     Interval: Here today for follow up of R ankle Vargas B fx that we are treating nonoperatively.  Doing well.  Has been ambulating in the boot with a cane for assistance.  Still having right shoulder pain.  He was never able to get into physical therapy following surgery as he broke his ankle soon after.    12 point ROS performed and negative except as above.     Past Medical History:    Past Medical History:   Diagnosis Date    A-fib     Bipolar affective  disorder, mixed     CVA (cerebral vascular accident)     Depression     Epilepsy     GERD (gastroesophageal reflux disease)     Hyperlipidemia     Hypertension     KRISTIN on CPAP     Pacemaker     TIA (transient ischemic attack)      Past Surgical History:   Procedure Laterality Date    A-V CARDIAC PACEMAKER INSERTION N/A 12/02/2022    Procedure: INSERTION, CARDIAC PACEMAKER, DUAL CHAMBER;  Surgeon: Hemant Maravilla DO;  Location: [facility] CATH LAB;  Service: Cardiology;  Laterality: N/A;  EXPLANT OF EVENT RECORDER /  IMPLANT OF DUAL CHAMBER PPM (SJM)    ARTHROSCOPIC DEBRIDEMENT OF SHOULDER Right 2/27/2024    Procedure: DEBRIDEMENT, SHOULDER, ARTHROSCOPIC;  Surgeon: Jeremy Pennington MD;  Location: [facility];  Service: Orthopedics;  Laterality: Right;    ARTHROSCOPY,SHOULDER,WITH BICEPS TENODESIS Right 2/27/2024    Procedure: ARTHROSCOPY,SHOULDER,WITH BICEPS TENODESIS;  Surgeon: Jeremy Pennington MD;  Location: [facility];  Service: Orthopedics;  Laterality: Right;    DECOMPRESSION OF SUBACROMIAL SPACE Right 2/27/2024    Procedure: DECOMPRESSION, SUBACROMIAL SPACE;  Surgeon: Jeremy Pennington MD;  Location: [facility];  Service: Orthopedics;  Laterality: Right;    FEMUR IM RUBEN REMOVAL      HIP SURGERY Left     Ruben inserted from knee to hip    INSERTION OF IMPLANTABLE LOOP RECORDER  03/25/2022    Dr. Maravilla    INSERTION OF PERMANENT PACEMAKER  12/02/2022    Dr. Maravilla    INTRAMEDULLARY RODDING OF FEMUR      OPEN REDUCTION AND INTERNAL FIXATION (ORIF) OF INJURY OF THUMB      REMOVAL OF IMPLANTABLE LOOP RECORDER  12/02/2022    Dr. Maravilla    ROTATOR CUFF REPAIR Right 2/27/2024    Procedure: REPAIR, ROTATOR CUFF;  Surgeon: Jeremy Pennington MD;  Location: [facility];  Service: Orthopedics;  Laterality: Right;    SKIN TAG REMOVAL      THUMB SURGERY       Family History   Problem Relation Name Age of Onset    Hypertension Mother      Hypertension Sister      Diabetes Sister      Hypertension Sister      Hypertension Sister      Hypertension  Sister      Hypertension Brother      Hypertension Brother      Hypertension Brother      Diabetes Paternal Grandmother       Social History     Socioeconomic History    Marital status:    Tobacco Use    Smoking status: Every Day     Current packs/day: 1.00     Average packs/day: 1 pack/day for 25.5 years (25.5 ttl pk-yrs)     Types: Cigarettes     Start date: 11/28/1998    Smokeless tobacco: Never    Tobacco comments:     Nicotine patches used    Substance and Sexual Activity    Alcohol use: Never     Comment: 1-2 times weekly    Drug use: Not Currently    Sexual activity: Yes     Medication List with Changes/Refills   Current Medications    ALPRAZOLAM (XANAX) 0.5 MG TABLET    TAKE ONE TABLET BY MOUTH TWICE A DAY AS NEEDED FOR ANXIETY    AMLODIPINE (NORVASC) 10 MG TABLET    Take 10 mg by mouth once daily.    ATORVASTATIN (LIPITOR) 20 MG TABLET    Take 20 mg by mouth once daily.    BRIVIACT 100 MG TAB    Take 100 mg by mouth 2 (two) times daily.    CARVEDILOL (COREG) 12.5 MG TABLET    Take 12.5 mg by mouth 2 (two) times daily.    CHOLECALCIFEROL, VITAMIN D3, 1,250 MCG (50,000 UNIT) CAPSULE    Take 1 capsule (50,000 Units total) by mouth every 7 days.    CLONIDINE 0.3 MG/24 HR TD PTWK (CATAPRES) 0.3 MG/24 HR    Place 1 patch onto the skin every 7 days.    CYCLOBENZAPRINE (FLEXERIL) 10 MG TABLET    Take 1 tablet by mouth every evening.    DICLOFENAC (VOLTAREN) 50 MG EC TABLET    Take 1 tablet (50 mg total) by mouth 2 (two) times daily as needed (pain, after food).    EPINEPHRINE (EPIPEN) 0.3 MG/0.3 ML ATIN    Inject 0.3 mLs (0.3 mg total) into the muscle as needed (allergic reaction).    ESCITALOPRAM OXALATE (LEXAPRO) 20 MG TABLET    Take 0.5 tablets (10 mg total) by mouth once daily. After breakfast    FOLIC ACID (FOLVITE) 1 MG TABLET    Take 1 tablet (1 mg total) by mouth once daily. After food    HYDROCHLOROTHIAZIDE (HYDRODIURIL) 12.5 MG TAB    Take 12.5 mg by mouth once daily.    HYDROCODONE-ACETAMINOPHEN  (NORCO)  MG PER TABLET    Take 1 tablet by mouth 3 (three) times daily as needed.    HYDROXYCHLOROQUINE (PLAQUENIL) 200 MG TABLET    Take 1 tablet (200 mg total) by mouth 2 (two) times daily.    LAMOTRIGINE (LAMICTAL) 200 MG TABLET    Take 200 mg by mouth 2 (two) times daily.    LEVETIRACETAM (KEPPRA) 1000 MG TABLET    Take 1,500 mg by mouth 2 (two) times daily.    LOSARTAN (COZAAR) 50 MG TABLET    Take 50 mg by mouth once daily.    MAGNESIUM OXIDE (MAG-OX) 400 MG (241.3 MG MAGNESIUM) TABLET    Take 1 tablet (400 mg total) by mouth nightly.    MELOXICAM (MOBIC) 15 MG TABLET    Take 1 tablet (15 mg total) by mouth once daily.    METHOCARBAMOL (ROBAXIN) 750 MG TAB    TAKE 1 TABLET BY MOUTH once daily Oral for 10 days    METHOTREXATE 2.5 MG TAB    Take 6 tablets (15 mg total) by mouth every 7 days. EVERY  MONDAY  TAKE 3 TAB AFTER BREAKFAST AND 3 TAB AFTER SUPPER    ONDANSETRON (ZOFRAN-ODT) 4 MG TBDL    Take 1 tablet (4 mg total) by mouth every 8 (eight) hours as needed (nausea vomiting).    OXCARBAZEPINE (TRILEPTAL) 600 MG TAB    Take 600 mg by mouth 2 (two) times daily.    PREGABALIN (LYRICA) 50 MG CAPSULE    Take 50 mg by mouth 3 (three) times daily.    RISPERIDONE (RISPERDAL) 1 MG TABLET    Take 1 mg by mouth 2 (two) times daily.    SERTRALINE (ZOLOFT) 100 MG TABLET    Take 100 mg by mouth once daily.    SILDENAFIL (VIAGRA) 50 MG TABLET    50 mg.    TRAZODONE (DESYREL) 100 MG TABLET    Take 100 mg by mouth every evening.    VALACYCLOVIR (VALTREX) 500 MG TABLET    Take 500 mg by mouth 2 (two) times daily.    VALSARTAN-HYDROCHLOROTHIAZIDE (DIOVAN-HCT) 320-25 MG PER TABLET    Take 1 tablet by mouth.     Review of patient's allergies indicates:   Allergen Reactions    Peanut Anaphylaxis       Physical Exam:  R ankle:   Swollen, no skin wrinkles  Warm and well perfused  Able to dorsiflex/plantar flex    Right shoulder:  Surgical incision is well healed  Diffuse tenderness to palpation globally      Imaging  independently interpreted:  Right ankle:  Minimally displaced Vargas B fibula fracture, interval callus formation  Right shoulder:  Located on the axillary view, no acute abnormality    Assessment and Plan:    Darius Restrepo is a 59 y.o. male with R massive rotator cuff repair (see above), now with new problem of right Vargas B ankle fracture with medial clear space widening.     -WBAT in the Cam boot, he can transition to a regular shoe   -We we will send him to physical therapy both for the right shoulder and the right ankle  -Follow up 3 months    Weight-bearing status:  Weightbearing as tolerated right lower extremity  Follow up: 3 months   XR/to do:  X-ray right ankle, Clinical exam right shoulder    Deisy Small MD  [employer] [facility] PGY3          Orders Placed This Encounter    X-ray Shoulder 2 or More Views Right    X-Ray Ankle Complete Right

## 2025-08-12 DIAGNOSIS — M25.511 RIGHT SHOULDER PAIN: Primary | ICD-10-CM

## 2025-08-21 ENCOUNTER — OFFICE VISIT (OUTPATIENT)
Dept: ORTHOPEDICS | Facility: CLINIC | Age: 60
End: 2025-08-21
Payer: MEDICARE

## 2025-08-21 VITALS
SYSTOLIC BLOOD PRESSURE: 158 MMHG | HEART RATE: 84 BPM | BODY MASS INDEX: 41.51 KG/M2 | WEIGHT: 249.13 LBS | HEIGHT: 65 IN | DIASTOLIC BLOOD PRESSURE: 102 MMHG

## 2025-08-21 DIAGNOSIS — M19.011 LOCALIZED OSTEOARTHRITIS OF RIGHT SHOULDER: ICD-10-CM

## 2025-08-21 DIAGNOSIS — M75.101 TEAR OF RIGHT ROTATOR CUFF, UNSPECIFIED TEAR EXTENT, UNSPECIFIED WHETHER TRAUMATIC: Primary | ICD-10-CM

## 2025-08-21 RX ORDER — LIDOCAINE HYDROCHLORIDE 20 MG/ML
2 INJECTION, SOLUTION INFILTRATION; PERINEURAL
Status: DISCONTINUED | OUTPATIENT
Start: 2025-08-21 | End: 2025-08-21 | Stop reason: HOSPADM

## 2025-08-21 RX ORDER — MELOXICAM 15 MG/1
15 TABLET ORAL DAILY
Qty: 30 TABLET | Refills: 0 | Status: SHIPPED | OUTPATIENT
Start: 2025-08-21

## 2025-08-21 RX ORDER — METHYLPREDNISOLONE ACETATE 80 MG/ML
80 INJECTION, SUSPENSION INTRA-ARTICULAR; INTRALESIONAL; INTRAMUSCULAR; SOFT TISSUE
Status: DISCONTINUED | OUTPATIENT
Start: 2025-08-21 | End: 2025-08-21 | Stop reason: HOSPADM

## 2025-08-21 RX ADMIN — METHYLPREDNISOLONE ACETATE 80 MG: 80 INJECTION, SUSPENSION INTRA-ARTICULAR; INTRALESIONAL; INTRAMUSCULAR; SOFT TISSUE at 09:08

## 2025-08-21 RX ADMIN — LIDOCAINE HYDROCHLORIDE 2 MG: 20 INJECTION, SOLUTION INFILTRATION; PERINEURAL at 09:08

## (undated) DEVICE — DRAPE ORTH SPLIT 77X108IN

## (undated) DEVICE — GLOVE SIGNATURE MICRO LTX 6

## (undated) DEVICE — GOWN POLY REINF X-LONG 2XL

## (undated) DEVICE — CANNULA NASAL ADULT

## (undated) DEVICE — DRAPE U STD FILM LG 60X84IN

## (undated) DEVICE — SUT SILK 0 BLK BR CT-1 30IN

## (undated) DEVICE — PENCIL ELECSURG ROCKER 15FT

## (undated) DEVICE — NDL ECLIPSE SAFETY 18GX1-1/2IN

## (undated) DEVICE — TUBE SET INFLOW/OUTFLOW

## (undated) DEVICE — SUT CTD VICRYL 0 UND BR CT

## (undated) DEVICE — BLADE SURG STAINLESS STEEL #11

## (undated) DEVICE — SOL NACL IRR 3000ML

## (undated) DEVICE — SUT 3-0 MONOCRYL PLUS PS-2

## (undated) DEVICE — COVER PROBE US 5.5X58L NON LTX

## (undated) DEVICE — GLOVE SENSICARE PI GRN 8

## (undated) DEVICE — GLOVE PROTEXIS BLUE LATEX 8.5

## (undated) DEVICE — KIT BASIC ORTHO UNIVERSITY

## (undated) DEVICE — PILLOW FACE ADLT FOAM W/VELCRO

## (undated) DEVICE — QUATTRO SUTURE PASSER NEEDLE

## (undated) DEVICE — BLADE SURG CARBON STEEL SZ11

## (undated) DEVICE — SUT VICRYL PLUS 0 CT1 18IN

## (undated) DEVICE — SOCKINETTE IMPERVIOUS 12X48IN

## (undated) DEVICE — STOCKINET 4INX48

## (undated) DEVICE — GOWN POLY REINF BRTH SLV XL

## (undated) DEVICE — ADHESIVE DERMABOND ADVANCED

## (undated) DEVICE — GLOVE 6.5 PROTEXIS PI BLUE

## (undated) DEVICE — STRIP MEDI WND CLSR 1/2X4IN

## (undated) DEVICE — DRAPE FULL SHEET 70X100IN

## (undated) DEVICE — BLADE SHAVER GREAT WHITE 3.5MM

## (undated) DEVICE — SUT VICRYL PLUS 2-0 CT1 18

## (undated) DEVICE — SPONGE GAUZE 16PLY 4X4

## (undated) DEVICE — GLOVE SENSICARE PI GRN 7

## (undated) DEVICE — MANIFOLD 4 PORT

## (undated) DEVICE — BLADE SHAVER 4.5 6/BX

## (undated) DEVICE — SPONGE LAP 18X18 PREWASHED

## (undated) DEVICE — DRAPE INCISE IOBAN 2 23X17IN

## (undated) DEVICE — SUT ETHILON 3-0 FS-1 30

## (undated) DEVICE — KIT SURGICAL TURNOVER

## (undated) DEVICE — SUT MCRYL PLUS 4-0 PS2 27IN

## (undated) DEVICE — ELECTRODE PATIENT RETURN DISP

## (undated) DEVICE — SYR 50CC LL

## (undated) DEVICE — SUT VICRYL 3-0 27 SH

## (undated) DEVICE — GLOVE SIGNATURE MICRO LTX 8

## (undated) DEVICE — SUT 2-0 VICRYL / SH (J417)

## (undated) DEVICE — PACK SHOULDER DRAPE POUCH

## (undated) DEVICE — YANKAUER FLEX NO VENT REG CAP

## (undated) DEVICE — Device

## (undated) DEVICE — SUPPORT SLING SHOT II MEDIUM

## (undated) DEVICE — ELECTRODE REM POLYHESIVE II

## (undated) DEVICE — DRAPE SHOULDER BEACH CHAIR

## (undated) DEVICE — TAPE BROADBAND TWO PACK 1.5MM

## (undated) DEVICE — POSITIONER HEEL FOAM CONVOLTD

## (undated) DEVICE — GLOVE PROTEXIS HYDROGEL SZ6.5

## (undated) DEVICE — SUT 3-0 VICRYL / SH (J416)

## (undated) DEVICE — COVER MAYO STAND REINFRCD 30

## (undated) DEVICE — GLOVE SIGNATURE MICRO LTX 7

## (undated) DEVICE — DRESSING TRANS 4X4 3/4

## (undated) DEVICE — SEE MEDLINE ITEM 157216

## (undated) DEVICE — SUT CTD VICRYL 2.0

## (undated) DEVICE — DRAPE STERI U-SHAPED 47X51IN

## (undated) DEVICE — BLADE GREAT WHITE 4.2 6/BX

## (undated) DEVICE — TUBING MEDI-VAC 20FT .25IN

## (undated) DEVICE — NDL ANES SPINAL 18X3.5ST 18G

## (undated) DEVICE — BENZOIN TINCTURE CAPSULET

## (undated) DEVICE — SYR 3CC LUER LOC

## (undated) DEVICE — GLOVE SIGNATURE MICRO LTX 7.5

## (undated) DEVICE — PAD ABDOMINAL STERILE 8X10IN

## (undated) DEVICE — STAPLER SKIN WIDE

## (undated) DEVICE — APPLICATOR CHLORAPREP ORN 26ML

## (undated) DEVICE — SYR 30CC LUER LOCK

## (undated) DEVICE — PROBE ARTHO ENERGY 90 DEG

## (undated) DEVICE — GLOVE PROTEXIS LTX MICRO 8

## (undated) DEVICE — PACK FLUID CONTROL SHOULDER

## (undated) DEVICE — PAD DEFIB CADENCE ADULT R2